# Patient Record
Sex: FEMALE | Race: WHITE | NOT HISPANIC OR LATINO | Employment: FULL TIME | URBAN - METROPOLITAN AREA
[De-identification: names, ages, dates, MRNs, and addresses within clinical notes are randomized per-mention and may not be internally consistent; named-entity substitution may affect disease eponyms.]

---

## 2018-03-09 ENCOUNTER — OFFICE VISIT (OUTPATIENT)
Dept: PULMONOLOGY | Facility: CLINIC | Age: 38
End: 2018-03-09
Payer: COMMERCIAL

## 2018-03-09 ENCOUNTER — HOSPITAL ENCOUNTER (OUTPATIENT)
Dept: RADIOLOGY | Facility: HOSPITAL | Age: 38
Discharge: HOME | End: 2018-03-09
Attending: INTERNAL MEDICINE
Payer: COMMERCIAL

## 2018-03-09 VITALS
DIASTOLIC BLOOD PRESSURE: 72 MMHG | OXYGEN SATURATION: 99 % | BODY MASS INDEX: 39.12 KG/M2 | RESPIRATION RATE: 18 BRPM | SYSTOLIC BLOOD PRESSURE: 130 MMHG | WEIGHT: 234.81 LBS | HEART RATE: 90 BPM | HEIGHT: 65 IN

## 2018-03-09 DIAGNOSIS — R06.02 SOB (SHORTNESS OF BREATH): Primary | ICD-10-CM

## 2018-03-09 DIAGNOSIS — Z87.09 HISTORY OF ASTHMA: ICD-10-CM

## 2018-03-09 DIAGNOSIS — R06.02 SOB (SHORTNESS OF BREATH): ICD-10-CM

## 2018-03-09 DIAGNOSIS — Z91.09 ENVIRONMENTAL ALLERGIES: ICD-10-CM

## 2018-03-09 DIAGNOSIS — J30.89 CHRONIC NON-SEASONAL ALLERGIC RHINITIS, UNSPECIFIED TRIGGER: ICD-10-CM

## 2018-03-09 PROCEDURE — 71046 X-RAY EXAM CHEST 2 VIEWS: CPT | Mod: 26,,, | Performed by: RADIOLOGY

## 2018-03-09 PROCEDURE — 99204 OFFICE O/P NEW MOD 45 MIN: CPT | Mod: S$GLB,,, | Performed by: INTERNAL MEDICINE

## 2018-03-09 PROCEDURE — 99999 PR PBB SHADOW E&M-NEW PATIENT-LVL IV: CPT | Mod: PBBFAC,,, | Performed by: INTERNAL MEDICINE

## 2018-03-09 PROCEDURE — 71046 X-RAY EXAM CHEST 2 VIEWS: CPT | Mod: TC

## 2018-03-09 RX ORDER — LISINOPRIL AND HYDROCHLOROTHIAZIDE 12.5; 2 MG/1; MG/1
1 TABLET ORAL
COMMUNITY
End: 2018-12-20

## 2018-03-09 RX ORDER — FLUOXETINE HYDROCHLORIDE 40 MG/1
40 CAPSULE ORAL
COMMUNITY
Start: 2016-03-28 | End: 2018-03-09 | Stop reason: ALTCHOICE

## 2018-03-09 RX ORDER — IRON,FM,PS/FOLIC/B,C18/L.CASEI 130-1.25MG
1 CAPSULE ORAL DAILY
Refills: 5 | COMMUNITY
Start: 2018-02-24 | End: 2018-12-20

## 2018-03-09 RX ORDER — FLUTICASONE FUROATE, UMECLIDINIUM BROMIDE AND VILANTEROL TRIFENATATE 100; 62.5; 25 UG/1; UG/1; UG/1
POWDER RESPIRATORY (INHALATION)
Refills: 0 | COMMUNITY
Start: 2018-01-11 | End: 2018-03-09 | Stop reason: ALTCHOICE

## 2018-03-09 RX ORDER — DOXYCYCLINE 100 MG/1
100 CAPSULE ORAL 2 TIMES DAILY
Refills: 0 | COMMUNITY
Start: 2018-01-11 | End: 2018-03-09 | Stop reason: ALTCHOICE

## 2018-03-09 RX ORDER — DICYCLOMINE HYDROCHLORIDE 20 MG/1
20 TABLET ORAL
COMMUNITY

## 2018-03-09 RX ORDER — MEDROXYPROGESTERONE ACETATE 150 MG/ML
150 INJECTION, SUSPENSION INTRAMUSCULAR
COMMUNITY
End: 2019-06-13 | Stop reason: ALTCHOICE

## 2018-03-09 RX ORDER — ALBUTEROL SULFATE 90 UG/1
AEROSOL, METERED RESPIRATORY (INHALATION)
Refills: 0 | COMMUNITY
Start: 2018-01-11 | End: 2022-11-01

## 2018-03-09 RX ORDER — ALPRAZOLAM 0.25 MG/1
TABLET ORAL
COMMUNITY
Start: 2016-02-08 | End: 2018-09-27

## 2018-03-09 RX ORDER — ESZOPICLONE 3 MG/1
TABLET, FILM COATED ORAL
COMMUNITY
Start: 2016-05-04 | End: 2018-09-27

## 2018-03-09 RX ORDER — OMEPRAZOLE 40 MG/1
40 CAPSULE, DELAYED RELEASE ORAL
COMMUNITY
End: 2018-09-27

## 2018-03-09 RX ORDER — ROSUVASTATIN CALCIUM 20 MG/1
20 TABLET, COATED ORAL
COMMUNITY
End: 2018-04-09 | Stop reason: ALTCHOICE

## 2018-03-09 RX ORDER — CYCLOBENZAPRINE HCL 10 MG
TABLET ORAL
COMMUNITY
Start: 2015-11-30 | End: 2018-09-27

## 2018-03-09 RX ORDER — CETIRIZINE HYDROCHLORIDE 10 MG/1
10 TABLET ORAL DAILY PRN
COMMUNITY

## 2018-03-09 RX ORDER — PROPRANOLOL HYDROCHLORIDE 60 MG/1
60 CAPSULE, EXTENDED RELEASE ORAL DAILY
COMMUNITY
Start: 2016-03-09 | End: 2018-12-20

## 2018-03-09 RX ORDER — FLUTICASONE FUROATE AND VILANTEROL 100; 25 UG/1; UG/1
1 POWDER RESPIRATORY (INHALATION) DAILY
Qty: 30 EACH | Refills: 4 | Status: SHIPPED | OUTPATIENT
Start: 2018-03-09 | End: 2018-12-20

## 2018-03-09 RX ORDER — FLUOXETINE HYDROCHLORIDE 20 MG/1
60 CAPSULE ORAL DAILY
Refills: 5 | COMMUNITY
Start: 2018-02-24 | End: 2018-09-27

## 2018-03-09 RX ORDER — MONTELUKAST SODIUM 10 MG/1
10 TABLET ORAL DAILY
COMMUNITY

## 2018-03-09 RX ORDER — METFORMIN HYDROCHLORIDE 1000 MG/1
1000 TABLET ORAL
COMMUNITY
End: 2018-09-27

## 2018-03-09 RX ORDER — ASPIRIN 81 MG/1
81 TABLET ORAL DAILY
COMMUNITY
End: 2022-03-03

## 2018-03-09 NOTE — PROGRESS NOTES
Initial Outpatient Pulmonary Evaluation       SUBJECTIVE:     History of Present Illness:  Patient is a 38 y.o. female presenting for evaluation of shortness of breath.  The patient states that she's been having shortness of breath at rest and on exertion for the last 2 years.  She has been recently at an urgent care clinic, she was prescribed albuterol and Trelegy inhalers.  She does state that her shortness of breath does improve significantly with albuterol.  However she she used albuterol and Trelegy Ellipta for a couple weeks only.  Interestingly she states that she has been asthmatic as a child and as a teenager and used to be on a rescue inhaler.  The patient has underwent weight-loss surgeries she did have a sleep test prior to her surgery and it was negative for sleep apnea.    Chief Complaint   Patient presents with    Shortness of Breath       Review of patient's allergies indicates:   Allergen Reactions    Codeine        Current Outpatient Prescriptions   Medication Sig Dispense Refill    ALPRAZolam (XANAX) 0.25 MG tablet TAKE ONE TABLET BY MOUTH EVERY NIGHT AT BEDTIME FOR SLEEP      aspirin (ECOTRIN) 81 MG EC tablet Take 81 mg by mouth.      cetirizine (ZYRTEC) 10 MG tablet Take 10 mg by mouth.      cyclobenzaprine (FLEXERIL) 10 MG tablet ONE TABLET BY MOUTH TWICE DAILY AS NEEDED FOR MUSCLE SPASMS      dicyclomine (BENTYL) 20 mg tablet Take 20 mg by mouth.      eszopiclone 3 mg Tab       FLUoxetine (PROZAC) 20 MG capsule Take 60 mg by mouth once daily.  5    FUSION PLUS 130 mg iron -1,250 mcg Cap Take 1 capsule by mouth once daily.  5    linaclotide (LINZESS) 145 mcg Cap capsule Take 1 tablet by mouth.      lisinopril-hydrochlorothiazide (PRINZIDE,ZESTORETIC) 20-12.5 mg per tablet Take 1 tablet by mouth.      medroxyPROGESTERone (DEPO-PROVERA) 150 mg/mL injection Inject 150 mg into the muscle.      metFORMIN (GLUCOPHAGE) 1000 MG tablet Take 1,000  mg by mouth.      montelukast (SINGULAIR) 10 mg tablet Take 10 mg by mouth.      omeprazole (PRILOSEC) 40 MG capsule Take 40 mg by mouth.      propranolol (INDERAL LA) 60 MG 24 hr capsule Take by mouth.      rosuvastatin (CRESTOR) 20 MG tablet Take 20 mg by mouth.      SITagliptin (JANUVIA) 50 MG Tab       fluticasone-vilanterol (BREO ELLIPTA) 100-25 mcg/dose diskus inhaler Inhale 1 puff into the lungs once daily. Controller 30 each 4    PROAIR HFA 90 mcg/actuation inhaler INHALE TWO PUFFS UP TO FOUR TIMES DAILY IF NEEDED FOR QUICK RELIEF ONLY  0     No current facility-administered medications for this visit.        Past Medical History:   Diagnosis Date    Anemia     Asthma     Childhood    Diabetes mellitus 2003    Fibromyalgia 2013    Heart disease     Hypertension 1996     Past Surgical History:   Procedure Laterality Date    SINUS SURGERY  1996    SINUS SURGERY  2007     Family History   Problem Relation Age of Onset    Cancer Mother     Hypertension Mother     Cancer Father     Asthma Paternal Uncle     Cancer Maternal Grandfather      Social History   Substance Use Topics    Smoking status: Former Smoker     Packs/day: 0.50     Years: 10.00     Types: Cigarettes     Quit date: 2009    Smokeless tobacco: Never Used    Alcohol use No        Review of Systems:  Review of Systems   Constitutional: Negative for fatigue.   HENT: Positive for congestion.         Sinus problems status post surgery   Eyes: Negative for photophobia and redness.   Respiratory: Positive for chest tightness and shortness of breath.    Cardiovascular: Negative for palpitations and leg swelling.   Gastrointestinal: Negative for abdominal pain.   Endocrine:        Diabetes mellitus   Genitourinary: Negative for hematuria.   Musculoskeletal: Negative for arthralgias and back pain.   Allergic/Immunologic: Positive for environmental allergies. Negative for immunocompromised state.   Neurological: Negative for seizures  "and syncope.   Hematological: Negative for adenopathy.   Psychiatric/Behavioral: Negative for behavioral problems.       OBJECTIVE:     Vital Signs (Most Recent)  Pulse: 90 (03/09/18 1539)  Resp: 18 (03/09/18 1539)  BP: 130/72 (03/09/18 1539)  SpO2: 99 % (03/09/18 1539)  5' 5" (1.651 m)  106.5 kg (234 lb 12.6 oz)     Physical Exam:  Physical Exam   Constitutional: She is oriented to person, place, and time. She appears well-developed and well-nourished.   HENT:   Head: Normocephalic and atraumatic.   Eyes: EOM are normal.   Neck: Neck supple.   Neck scar anterior, remote spine surgery   Cardiovascular: Normal rate and regular rhythm.    Pulmonary/Chest: Effort normal. No respiratory distress. She has no wheezes. She has no rales. She exhibits no tenderness.   Breath sounds decreased minimally at bases   Abdominal: Soft. There is no tenderness.   Musculoskeletal: She exhibits no edema or deformity.   Neurological: She is alert and oriented to person, place, and time.   Skin: Skin is warm.   Psychiatric: She has a normal mood and affect.       Laboratory    No results found for: WBC, HGB, HCT, MCV, PLT  BMP  No results found for: NA, K, CL, CO2, BUN, CREATININE, CALCIUM, ANIONGAP, ESTGFRAFRICA, EGFRNONAA  BNP  @LABRCNTIP(BNP,BNPTRIAGEBLO)@  No results found for: TSH  ABG  @LABRCNTIP(PH,PO2,PCO2,HCO3,BE)@    Diagnostic Results:  X-Ray: Reviewed  No acute abnormalities    X-ray Chest Pa And Lateral    Result Date: 3/9/2018  No active disease. Electronically signed by: João Israel MD Date:    03/09/2018 Time:    15:36        ASSESSMENT/PLAN:     SOB (shortness of breath)  -     fluticasone-vilanterol (BREO ELLIPTA) 100-25 mcg/dose diskus inhaler; Inhale 1 puff into the lungs once daily. Controller  Dispense: 30 each; Refill: 4  -     Complete PFT with bronchodilator; Future    History of asthma  -     CBC auto differential; Future; Expected date: 03/09/2018  -     IGE; Future; Expected date: 03/09/2018    Environmental " allergies  -     CBC auto differential; Future; Expected date: 03/09/2018  -     IGE; Future; Expected date: 03/09/2018    Chronic non-seasonal allergic rhinitis, unspecified trigger  -     CBC auto differential; Future; Expected date: 03/09/2018  -     IGE; Future; Expected date: 03/09/2018      This patient most likely has a mild asthma, I will treat her empirically with Breo 100 µg 1 puff daily, I have explained to her the need to be compliant with daily usage of this controller inhaler, continue albuterol as needed.    Continue steroid nasal spray plus Singulair at bedtime.    Check IgE level CBC with differential to evaluate for atopy and eosinophilia      Follow-up in about 2 weeks (around 3/23/2018).    This note was prepared using voice recognition system and is likely to have sound alike errors that may have been overlooked even after proof reading.  Please call me with any questions    Discussed diagnosis, its evaluation, treatment and usual course. All questions answered.    Thank you for the courtesy of participating in the care of this patient    Tanvir Mosher MD

## 2018-03-15 ENCOUNTER — LAB VISIT (OUTPATIENT)
Dept: LAB | Facility: HOSPITAL | Age: 38
End: 2018-03-15
Attending: INTERNAL MEDICINE
Payer: COMMERCIAL

## 2018-03-15 ENCOUNTER — INITIAL CONSULT (OUTPATIENT)
Dept: HEMATOLOGY/ONCOLOGY | Facility: CLINIC | Age: 38
End: 2018-03-15
Payer: COMMERCIAL

## 2018-03-15 VITALS
DIASTOLIC BLOOD PRESSURE: 70 MMHG | HEIGHT: 65 IN | OXYGEN SATURATION: 100 % | WEIGHT: 236.31 LBS | SYSTOLIC BLOOD PRESSURE: 100 MMHG | HEART RATE: 73 BPM | RESPIRATION RATE: 18 BRPM | BODY MASS INDEX: 39.37 KG/M2 | TEMPERATURE: 98 F

## 2018-03-15 DIAGNOSIS — D50.8 OTHER IRON DEFICIENCY ANEMIAS: ICD-10-CM

## 2018-03-15 DIAGNOSIS — E61.1 IRON DEFICIENCY: Primary | ICD-10-CM

## 2018-03-15 DIAGNOSIS — Z98.84 S/P BARIATRIC SURGERY: ICD-10-CM

## 2018-03-15 DIAGNOSIS — E61.1 IRON DEFICIENCY: ICD-10-CM

## 2018-03-15 PROCEDURE — 99999 PR PBB SHADOW E&M-EST. PATIENT-LVL IV: CPT | Mod: PBBFAC,,, | Performed by: INTERNAL MEDICINE

## 2018-03-15 PROCEDURE — 82607 VITAMIN B-12: CPT

## 2018-03-15 PROCEDURE — 83540 ASSAY OF IRON: CPT

## 2018-03-15 PROCEDURE — 82728 ASSAY OF FERRITIN: CPT

## 2018-03-15 PROCEDURE — 99245 OFF/OP CONSLTJ NEW/EST HI 55: CPT | Mod: S$GLB,,, | Performed by: INTERNAL MEDICINE

## 2018-03-15 PROCEDURE — 36415 COLL VENOUS BLD VENIPUNCTURE: CPT | Mod: PO

## 2018-03-15 RX ORDER — HEPARIN 100 UNIT/ML
500 SYRINGE INTRAVENOUS
Status: CANCELLED | OUTPATIENT
Start: 2018-03-30

## 2018-03-15 RX ORDER — SODIUM CHLORIDE 0.9 % (FLUSH) 0.9 %
10 SYRINGE (ML) INJECTION
Status: CANCELLED | OUTPATIENT
Start: 2018-03-30

## 2018-03-15 RX ORDER — SODIUM CHLORIDE 0.9 % (FLUSH) 0.9 %
10 SYRINGE (ML) INJECTION
Status: CANCELLED | OUTPATIENT
Start: 2018-03-23

## 2018-03-15 RX ORDER — HEPARIN 100 UNIT/ML
500 SYRINGE INTRAVENOUS
Status: CANCELLED | OUTPATIENT
Start: 2018-03-23

## 2018-03-15 NOTE — LETTER
March 15, 2018      MOJGAN Lyons  [address]  [address]  [facility] [address]           [facility] - Hematology Oncology  [address]  [place] [address]  Phone: [phone]  Fax: [phone]          Patient: Shelby Meeks   MR Number: [id number]   YOB: 1980   Date of Visit: 3/15/2018       Dear Tamiko Sanchez:    Thank you for referring Shelby Meeks to me for evaluation. Attached you will find relevant portions of my assessment and plan of care.    If you have questions, please do not hesitate to call me. I look forward to following Shelby Meeks along with you.    Sincerely,    Evan Carroll MD    Enclosure  CC:  No Recipients    If you would like to receive this communication electronically, please contact [email or web address] or [phone] to request more information on [facility] access.    For providers and/or their staff who would like to refer a patient to [facility], please contact us through our one-stop-shop provider referral line, [facility], at [phone].    If you feel you have received this communication in error or would no longer like to receive these types of communications, please e-mail [email or web address]

## 2018-03-15 NOTE — PROGRESS NOTES
MAIN COMPLAINT:  We are asked by Dr. Lori Sanchez to evaluate this 38-year-old    lady in regard to her history of iron deficiency.    HISTORY OF PRESENT ILLNESS:  This 38-year-old  lady had a gastric band   in the year 2001 for weight loss and it was reversed in 2006.  She ended up   having a gastric bypass in 2015.    She has not menstruated in many years because of being on Depo-Provera, which   she takes for treatment for treatment/ prevention of endometriosis.     She has been taking oral iron for several months without any significant   improvement.  Her CBC shows a hemoglobin of 11.2 with an MCV of 85, white cell   count of 9620 (normal differential), a platelet count 459,000.    The patient had a serum iron of 40 with a total iron binding capacity of 546 and   a saturated iron of 7 done at [facility] recently.  Ferritin is   pending.    She feels fatigued.    ALLERGIES:  Codeine.    MEDICATIONS:  See MedCard.    PREVIOUS SURGERIES:  Tonsillectomy, adenoidectomy, sinus surgery for removal of   polyps, 2 spinal surgeries, 2 laparoscopies due to endometriosis, gastric   banding in 2001 and revision in 2006 and gastric bypass in 2015.  She has had a   neck surgery.    SOCIAL HISTORY:  She is single with no children.  She lives in [place].    She used to smoke half a pack a day for 10 years and stopped 2009.  Denies   significant drinking.  She works as a .    FAMILY HISTORY:  Maternal grandfather had lung cancer.  Maternal great aunt had   ovarian cancer.  Father and mother had diabetes.  Father had a heart attack.    PAST MEDICAL HISTORY:  1.  Status post bariatric surgery for morbid obesity.  2.  History of tobacco use.  3.  History of diabetes mellitus.  4.  Iron deficiency anemia.  5.  Endometriosis.    REVIEW OF SYSTEMS:  GENERAL:  No weight loss or fatigue.  EYES:  No vision problems or excessive lacrimation.  OROPHARYNX:  No difficulty or pain on  swallowing.  ENDOCRINE:  No heat or cold intolerance.  LUNGS:  No shortness of breath or coughing.  CARDIOVASCULAR:  No chest pains or palpitation.  GASTROINTESTINAL:  No nausea, vomiting or diarrhea.  No hepatitis or jaundice.  GENITOURINARY:   0, para 0.  On Provera for many years.  She has had a   laparoscopies for endometriosis.  BREASTS:  No tenderness or lumps.  SKIN:  No blisters or ecchymosis.  NEUROLOGIC:  No headaches or seizures.  PSYCHIATRIC:  No mood swings or depression.    PHYSICAL EXAMINATION:  GENERAL:  She was well groomed.  She interacted normally during the interview.  VITAL SIGNS:  Weight 236 pounds, height 65 inches, blood pressure 100/70, pulse   72, respirations 18.  EYES:  No jaundice or paleness.  OROPHARYNX:  No ulcers.  No exudates.  ENDOCRINE:  No palpable thyroid.  LYMPHATICS:  No cervical or supraclavicular adenopathy.  NECK:  No jugular venous distension or masses.  LUNGS:  Clear and well ventilated without rales, wheezes or rhonchi.  CARDIOVASCULAR:  The heart was rhythmic without murmurs or gallops.  ABDOMEN:  Obese, no obvious hepatosplenomegaly, guarding or rebound.  EXTREMITIES:  No edema.  Good dorsalis pedis and posterior tibialis pulses.  SKIN:  No rashes or ecchymosis.  NEUROLOGIC:  Coordination, strength, and gait were normal.  PSYCHIATRIC:  Mood was appropriate.  Lucid and oriented x3.    DISCUSSION:  This patient has tests recently that showed iron deficiency anemia.    We will repeat today a ferritin, TIBC, and serum B12.  She has been told that   in the patient who has had bariatric surgery, it is not uncommon to develop iron   deficiency anemia due to lack of absorption.  She has not had a menstrual   period in quite some time after being on Provera, so menstrual iron losses are   not an issue.    The patient will see me in seven days and we will plan on starting her on   Injectafer.      RDF/HN  dd: 03/15/2018 16:27:31 (CDT)  td: 03/15/2018 20:31:28 (CDT)  Doc  ID   #[id number]  Job ID #[id number]    CC: Tamiko ULRICH

## 2018-03-16 LAB
FERRITIN SERPL-MCNC: 9 NG/ML
IRON SERPL-MCNC: 17 UG/DL
SATURATED IRON: 3 %
TOTAL IRON BINDING CAPACITY: 583 UG/DL
TRANSFERRIN SERPL-MCNC: 394 MG/DL
VIT B12 SERPL-MCNC: 231 PG/ML

## 2018-03-20 ENCOUNTER — TELEPHONE (OUTPATIENT)
Dept: PULMONOLOGY | Facility: CLINIC | Age: 38
End: 2018-03-20

## 2018-03-20 NOTE — TELEPHONE ENCOUNTER
Contacted pt to advise that blood work was okay. Not showing significant allergies.  No answer. Left message for return call.

## 2018-03-20 NOTE — TELEPHONE ENCOUNTER
----- Message from Tanvir Mosher MD sent at 3/10/2018 11:42 AM CST -----  Please inform patient blood work was okay. Not showing significant allergies.   Thank you

## 2018-03-21 NOTE — TELEPHONE ENCOUNTER
Returned call to pt to advise that blood work was okay. Not showing significant allergies. She verbalized understanding.

## 2018-03-21 NOTE — TELEPHONE ENCOUNTER
----- Message from Franklin Agarwal sent at 3/21/2018  9:40 AM CDT -----  Pt is returning a call to nurse to discuss lab results.        Please call pt back at [phone]

## 2018-03-23 ENCOUNTER — INFUSION (OUTPATIENT)
Dept: INFUSION THERAPY | Facility: HOSPITAL | Age: 38
End: 2018-03-23
Attending: INTERNAL MEDICINE
Payer: COMMERCIAL

## 2018-03-23 ENCOUNTER — LAB VISIT (OUTPATIENT)
Dept: LAB | Facility: HOSPITAL | Age: 38
End: 2018-03-23
Attending: INTERNAL MEDICINE
Payer: COMMERCIAL

## 2018-03-23 ENCOUNTER — OFFICE VISIT (OUTPATIENT)
Dept: HEMATOLOGY/ONCOLOGY | Facility: CLINIC | Age: 38
End: 2018-03-23
Payer: COMMERCIAL

## 2018-03-23 VITALS
SYSTOLIC BLOOD PRESSURE: 110 MMHG | HEART RATE: 90 BPM | OXYGEN SATURATION: 100 % | RESPIRATION RATE: 18 BRPM | TEMPERATURE: 99 F | WEIGHT: 233.69 LBS | BODY MASS INDEX: 38.93 KG/M2 | HEIGHT: 65 IN | DIASTOLIC BLOOD PRESSURE: 70 MMHG

## 2018-03-23 VITALS — DIASTOLIC BLOOD PRESSURE: 63 MMHG | SYSTOLIC BLOOD PRESSURE: 96 MMHG | HEART RATE: 77 BPM

## 2018-03-23 DIAGNOSIS — Z98.84 S/P BARIATRIC SURGERY: ICD-10-CM

## 2018-03-23 DIAGNOSIS — D51.8 OTHER VITAMIN B12 DEFICIENCY ANEMIAS: ICD-10-CM

## 2018-03-23 DIAGNOSIS — D50.8 OTHER IRON DEFICIENCY ANEMIAS: Primary | ICD-10-CM

## 2018-03-23 LAB — B-HCG UR QL: NEGATIVE

## 2018-03-23 PROCEDURE — 81025 URINE PREGNANCY TEST: CPT | Mod: PO

## 2018-03-23 PROCEDURE — 96374 THER/PROPH/DIAG INJ IV PUSH: CPT | Mod: PO

## 2018-03-23 PROCEDURE — 25000003 PHARM REV CODE 250: Mod: PO | Performed by: INTERNAL MEDICINE

## 2018-03-23 PROCEDURE — 99999 PR PBB SHADOW E&M-EST. PATIENT-LVL IV: CPT | Mod: PBBFAC,,, | Performed by: INTERNAL MEDICINE

## 2018-03-23 PROCEDURE — 63600175 PHARM REV CODE 636 W HCPCS: Mod: JG,PO | Performed by: INTERNAL MEDICINE

## 2018-03-23 PROCEDURE — 99214 OFFICE O/P EST MOD 30 MIN: CPT | Mod: 25,S$GLB,, | Performed by: INTERNAL MEDICINE

## 2018-03-23 RX ORDER — CYANOCOBALAMIN 1000 UG/ML
1000 INJECTION, SOLUTION INTRAMUSCULAR; SUBCUTANEOUS
Status: CANCELLED
Start: 2018-04-01

## 2018-03-23 RX ADMIN — FERRIC CARBOXYMALTOSE INJECTION 750 MG: 50 INJECTION, SOLUTION INTRAVENOUS at 01:03

## 2018-03-23 RX ADMIN — SODIUM CHLORIDE: 9 INJECTION, SOLUTION INTRAVENOUS at 01:03

## 2018-03-23 NOTE — PROGRESS NOTES
Subjective:       Patient ID: Shelby Meeks is a 38 y.o. female.    Chief Complaint: Anemia    HPI  This 38-year-old  lady had a gastric band   in the year 2001 for weight loss and it was reversed in 2006.  She ended up   having a gastric bypass in 2015.     She has not menstruated in many years because of being on Depo-Provera, which   she takes for treatment for treatment/ prevention of endometriosis.      She has been taking oral iron for several months without any significant   improvement.  Her CBC shows a hemoglobin of 11.2 with an MCV of 85, white cell   count of 9620 (normal differential), a platelet count 459,000.     The patient had a serum iron of 40 with a total iron binding capacity of 546 and   a saturated iron of 7 done at [facility] recently.      She feels fatigued.  Lab tests show a low ferritin of 9 and a B12 level kn the lower limits of normal at 231.     ALLERGIES:  Codeine.     MEDICATIONS:  See MedCard.     PREVIOUS SURGERIES:  Tonsillectomy, adenoidectomy, sinus surgery for removal of   polyps, 2 spinal surgeries, 2 laparoscopies due to endometriosis, gastric   banding in 2001 and revision in 2006 and gastric bypass in 2015.  She has had a   neck surgery.     SOCIAL HISTORY:  She is single with no children.  She lives in [place].    She used to smoke half a pack a day for 10 years and stopped 2009.  Denies   significant drinking.  She works as a .     FAMILY HISTORY:  Maternal grandfather had lung cancer.  Maternal great aunt had   ovarian cancer.  Father and mother had diabetes.  Father had a heart attack.     PAST MEDICAL HISTORY:  1.  Status post bariatric surgery for morbid obesity.  2.  History of tobacco use.  3.  History of diabetes mellitus.  4.  Iron deficiency anemia.  5.  Endometriosis.     Review of Systems   Constitutional: Negative.    HENT: Negative.    Eyes: Negative.    Respiratory: Negative.  Negative for cough and  wheezing.    Cardiovascular: Negative.  Negative for chest pain.   Gastrointestinal: Negative.    Genitourinary: Negative.    Neurological: Negative.    Psychiatric/Behavioral: Negative.        Objective:      Physical Exam   Constitutional: She is oriented to person, place, and time. She appears well-developed. No distress.   HENT:   Head: Normocephalic.   Right Ear: Tympanic membrane, external ear and ear canal normal.   Left Ear: Tympanic membrane, external ear and ear canal normal.   Nose: Nose normal. Right sinus exhibits no maxillary sinus tenderness and no frontal sinus tenderness. Left sinus exhibits no maxillary sinus tenderness and no frontal sinus tenderness.   Mouth/Throat: Oropharynx is clear and moist and mucous membranes are normal.   Teeth normal.  Gums normal.   Eyes: Conjunctivae and lids are normal. Pupils are equal, round, and reactive to light.   Neck: Normal carotid pulses, no hepatojugular reflux and no JVD present. Carotid bruit is not present. No tracheal deviation present. No thyroid mass and no thyromegaly present.   Cardiovascular: Normal rate, regular rhythm, S1 normal, S2 normal, normal heart sounds and intact distal pulses.  Exam reveals no gallop and no friction rub.    No murmur heard.  Carotid exam normal   Pulmonary/Chest: Effort normal and breath sounds normal. No accessory muscle usage. No respiratory distress. She has no wheezes. She has no rales. She exhibits no tenderness.   Abdominal: Soft. Normal appearance. She exhibits no distension and no mass. There is no splenomegaly or hepatomegaly. There is no tenderness. There is no rebound and no guarding.   Musculoskeletal: Normal range of motion. She exhibits no edema or tenderness.        Right hand: Normal.        Left hand: Normal.       Lymphadenopathy:     She has no cervical adenopathy.     She has no axillary adenopathy.        Right: No inguinal and no supraclavicular adenopathy present.        Left: No inguinal and no  supraclavicular adenopathy present.   Neurological: She is alert and oriented to person, place, and time. She has normal strength. No cranial nerve deficit. Coordination normal.   Skin: Skin is warm and dry. No rash noted. She is not diaphoretic. No cyanosis or erythema. No pallor. Nails show no clubbing.   Psychiatric: She has a normal mood and affect. Her behavior is normal. Judgment and thought content normal.       Wt Readings from Last 3 Encounters:   03/23/18 106 kg (233 lb 11 oz)   03/15/18 107.2 kg (236 lb 5.3 oz)   03/09/18 106.5 kg (234 lb 12.6 oz)     Temp Readings from Last 3 Encounters:   03/23/18 98.7 °F (37.1 °C)   03/15/18 98 °F (36.7 °C) (Oral)     BP Readings from Last 3 Encounters:   03/23/18 110/70   03/15/18 100/70   03/09/18 130/72     Pulse Readings from Last 3 Encounters:   03/23/18 90   03/15/18 73   03/09/18 90       Assessment:       1. Other iron deficiency anemias    2. Other vitamin B12 deficiency anemias    3. S/P bariatric surgery        Plan:       .  Lab Results   Component Value Date    WBC 9.62 03/09/2018    HGB 11.2 (L) 03/09/2018    HCT 35.2 (L) 03/09/2018    MCV 83 03/09/2018     (H) 03/09/2018     Lab Results   Component Value Date    IRON 17 (L) 03/15/2018    TIBC 583 (H) 03/15/2018    FERRITIN 9 (L) 03/15/2018     Lab Results   Component Value Date    UNUQYVNO93 231 03/15/2018     Will gie the first dose of Injectafer today. Will start B12 supplementation at the office once every month   We feel her post-bariatric surgery status is the reason for her iron and b12 deficiency.In any case,   Hemoccult slides given to patient to bring to next appointment

## 2018-03-23 NOTE — PATIENT INSTRUCTIONS
Iron-Deficiency Anemia (Adult)  Red blood cells carry oxygen to the tissues of your body. Anemia is a condition in which you have too few red blood cells. You need iron to make red cells. Anemia makes you feel tired and run down. When anemia becomes severe, your skin becomes pale. You may feel short of breath after physical activity. Other symptoms include:  · Headaches  · Dizziness  · Leg cramps with physical activity  · Drowsiness  · Restless legs  Your anemia is caused by not having enough iron in your body. This may be because of:  · Loss of blood. This can be caused by heavy menstrual periods. It can also be caused by bleeding from the stomach or intestines.  · Poor diet. You may not be eating enough foods that contain iron.  · Inability to absorb iron from the foods you eat  · Pregnancy  If your blood count is low enough, your healthcare provider may prescribe an iron supplement. It usually takes about 2 to 3 months of treatment with iron supplements to correct anemia. Severe cases of anemia need a blood transfusion to quickly ease symptoms and deliver more oxygen to the cells.  Home care  Follow these guidelines when caring for yourself at home:  · Eat foods high in iron. This will boost the amount of iron stored in your body. It is a natural way to build up the number of blood cells. Good sources of iron include beef, liver, spinach and other dark green leafy vegetables, whole grains, beans, and nuts.  · Do not overexert yourself.  · Talk with your healthcare provider before traveling by air or traveling to high altitudes.  Follow-up care  Follow up with your healthcare provider in 2 months, or as advised. This is to have another red blood cell count to be sure your anemia has been fixed.  When to seek medical advice  Call your healthcare provider right away if any of these occur:  · Shortness of breath or chest pain  · Dizziness or fainting  · Vomiting blood or passing red or black-colored stool   Date  Last Reviewed: 2/25/2016 © 2000-2017 [employer]. [address] [place] [address], [place] [address]. All rights reserved. This information is not intended as a substitute for professional medical care. Always follow your healthcare professional's instructions.        Ferric carboxymaltose injection  What is this medicine?  FERRIC CARBOXYMALTOSE (ferr-ik car-box-ee-mol-toes) is an iron complex. Iron is used to make healthy red blood cells, which carry oxygen and nutrients throughout the body. This medicine is used to treat anemia in people with chronic kidney disease or people who cannot take iron by mouth.  How should I use this medicine?  This medicine is for infusion into a vein. It is given by a health care professional in a hospital or clinic setting.  Talk to your pediatrician regarding the use of this medicine in children. Special care may be needed.  What side effects may I notice from receiving this medicine?  Side effects that you should report to your doctor or health care professional as soon as possible:  · allergic reactions like skin rash, itching or hives, swelling of the face, lips, or tongue -breathing problems  · changes in blood pressure  · feeling faint or lightheaded, falls  · flushing, sweating, or hot feelings  Side effects that usually do not require medical attention (Report these to your doctor or health care professional if they continue or are bothersome.):  · changes in taste  · constipation  · dizziness  · headache  · nausea  · pain, redness, or irritation at site where injected  · vomiting  What may interact with this medicine?  Do not take this medicine with any of the following medications:  · deferoxamine  · dimercaprol  · other iron products  This medicine may also interact with the following medications:  · chloramphenicol  · deferasirox  What if I miss a dose?  It is important not to miss your dose. Call your doctor or health care professional if you are unable to keep an  appointment.  Where should I keep my medicine?  This drug is given in a hospital or clinic and will not be stored at home.  What should I tell my health care provider before I take this medicine?  They need to know if you have any of these conditions:  · anemia not caused by low iron levels  · high levels of iron in the blood  · liver disease  · an unusual or allergic reaction to iron, other medicines, foods, dyes, or preservatives  · pregnant or trying to get pregnant  · breast-feeding  What should I watch for while using this medicine?  Visit your doctor or health care professional regularly. Tell your doctor if your symptoms do not start to get better or if they get worse. You may need blood work done while you are taking this medicine.  You may need to follow a special diet. Talk to your doctor. Foods that contain iron include: whole grains/cereals, dried fruits, beans, or peas, leafy green vegetables, and organ meats (liver, kidney).  NOTE:This sheet is a summary. It may not cover all possible information. If you have questions about this medicine, talk to your doctor, pharmacist, or health care provider. Copyright© 2017 [employer]        Cyanocobalamin, Vitamin B12 injection  What is this medicine?  CYANOCOBALAMIN (sye an oh koe BAL a min) is a man made form of vitamin B12. Vitamin B12 is used in the growth of healthy blood cells, nerve cells, and proteins in the body. It also helps with the metabolism of fats and carbohydrates. This medicine is used to treat people who can not absorb vitamin B12.  How should I use this medicine?  This medicine is injected into a muscle or deeply under the skin. It is usually given by a health care professional in a clinic or doctor's office. However, your doctor may teach you how to inject yourself. Follow all instructions.  Talk to your pediatrician regarding the use of this medicine in children. Special care may be needed.  What side effects may I notice from receiving  this medicine?  Side effects that you should report to your doctor or health care professional as soon as possible:  · allergic reactions like skin rash, itching or hives, swelling of the face, lips, or tongue  · blue tint to skin  · chest tightness, pain  · difficulty breathing, wheezing  · dizziness  · red, swollen painful area on the leg  Side effects that usually do not require medical attention (report to your doctor or health care professional if they continue or are bothersome):  · diarrhea  · headache  What may interact with this medicine?  · colchicine  · heavy alcohol intake  What if I miss a dose?  If you are given your dose at a clinic or doctor's office, call to reschedule your appointment. If you give your own injections and you miss a dose, take it as soon as you can. If it is almost time for your next dose, take only that dose. Do not take double or extra doses.  Where should I keep my medicine?  Keep out of the reach of children.  Store at room temperature between 15 and 30 degrees C (59 and 85 degrees F). Protect from light. Throw away any unused medicine after the expiration date.  What should I tell my health care provider before I take this medicine?  They need to know if you have any of these conditions:  · kidney disease  · Landon's disease  · megaloblastic anemia  · an unusual or allergic reaction to cyanocobalamin, cobalt, other medicines, foods, dyes, or preservatives  · pregnant or trying to get pregnant  · breast-feeding  What should I watch for while using this medicine?  Visit your doctor or health care professional regularly. You may need blood work done while you are taking this medicine.  You may need to follow a special diet. Talk to your doctor. Limit your alcohol intake and avoid smoking to get the best benefit.  NOTE:This sheet is a summary. It may not cover all possible information. If you have questions about this medicine, talk to your doctor, pharmacist, or health care  provider. Copyright© 2017 [employer]        Vitamin B-12  Does this test have other names?  VB12, serum cobalamin  What is this test?  This test measures the level of vitamin B-12 in your blood. You need this vitamin to make red blood cells and for your nervous system to function as it should.  You get vitamin B-12 from eating foods that come from animals, such as meat, eggs, and dairy products. Vitamin B-12 is also added to some cereals. You can also take this vitamin as a supplement in pill form.  Why do I need this test?  You may need this test if your healthcare provider suspects that your vitamin B-12 level is low. A low level of vitamin B-12 is called vitamin B-12 deficiency. You are more likely to have vitamin deficiency if you are an older adult, have a digestive disorder called malabsorption, have had gastrointestinal surgery, or eat a vegan diet. Women who are pregnant or breastfeeding and eat a vegetarian-type diet are at high risk for this deficiency.  You may also need this test if you've been diagnosed with or your healthcare provider suspects a disease called pernicious anemia. Pernicious anemia affects the lining of your stomach and makes it hard to absorb vitamin B-12.  These are common symptoms of vitamin B-12 deficiency:  · Fatigue  · Weakness  · Weight loss  · Tingling or numbness of the hands and feet  · Constipation  · Poor balance  · Confusion  · Depression  · Memory loss  · Soreness of the mouth or tongue  What other tests might I have along with this test?  Your healthcare provider may order other tests to help find out the cause of your vitamin B-12 deficiency. These tests may include:  · Complete blood count  · Peripheral blood smear, which involves looking at your blood cells under a microscope  · Folic acid level. This vitamin is also important for red blood cell production.  What do my test results mean?  Many things may affect your lab test results. These include the method each lab  uses to do the test. Even if your test results are different from the normal value, you may not have a problem. To learn what the results mean for you, talk with your healthcare provider.  Vitamin B-12 is measured in picograms per milliliter (pg/mL). Normal results are:  · 200 to 835 pg/mL for adults  · 160 to 1,300 pg/mL for newborns  If your results are low, you may have:  · Pernicious anemia  · Malabsorption from inflammatory bowel disease or other causes  · Poor absorption because of surgery  · Tapeworm infection  · Too little intake of animal protein  · Folic acid deficiency  · Iron deficiency  If your levels are high, you may have:  · Liver or kidney disease  · Diabetes  · Obesity  · White blood cell cancer  High levels may also mean that you have chronic obstructive pulmonary disease , congestive heart failure, or a thickening of the blood called polycythemia vera.  How is this test done?  The test requires a blood sample, which is drawn through a needle from a vein in your arm.  Does this test pose any risks?  Taking a blood sample with a needle carries risks that include bleeding, infection, bruising, or feeling dizzy. When the needle pricks your arm, you may feel a slight stinging sensation or pain. Afterward, the site may be slightly sore.  What might affect my test results?  Certain conditions may affect your test results. These include:  · Pregnancy  · Recent blood transfusions  · Smoking  Medicines in general may also affect your results. Specific medicines include supplements of vitamin A or C and birth control pills.  How do I get ready for this test?  You must fast before this test. You may be able to drink water, but you should not eat or drink anything else after midnight on the night before the test. If you are not having the test in the morning, ask your healthcare provider how long you need to fast before the test. You should not have a vitamin B-12 injection before the test. Also be sure your  provider knows about all medicines, herbs, vitamins, and supplements you are taking. This includes medicines that don't need a prescription and any illicit drugs you may use.  © 4983-5148 [employer]. [address] [place] [address], [place] [address]. All rights reserved. This information is not intended as a substitute for professional medical care. Always follow your healthcare professional's instructions.

## 2018-03-23 NOTE — PLAN OF CARE
"Problem: Patient Care Overview  Goal: Plan of Care Review  Outcome: Ongoing (interventions implemented as appropriate)  "I feel fine, just have low energy."      "

## 2018-04-02 ENCOUNTER — INFUSION (OUTPATIENT)
Dept: INFUSION THERAPY | Facility: HOSPITAL | Age: 38
End: 2018-04-02
Attending: INTERNAL MEDICINE
Payer: COMMERCIAL

## 2018-04-02 VITALS
TEMPERATURE: 99 F | SYSTOLIC BLOOD PRESSURE: 99 MMHG | OXYGEN SATURATION: 98 % | DIASTOLIC BLOOD PRESSURE: 70 MMHG | HEART RATE: 90 BPM | RESPIRATION RATE: 16 BRPM

## 2018-04-02 DIAGNOSIS — D51.8 OTHER VITAMIN B12 DEFICIENCY ANEMIAS: ICD-10-CM

## 2018-04-02 DIAGNOSIS — D50.8 OTHER IRON DEFICIENCY ANEMIAS: Primary | ICD-10-CM

## 2018-04-02 PROCEDURE — 96365 THER/PROPH/DIAG IV INF INIT: CPT | Mod: PO

## 2018-04-02 PROCEDURE — 96372 THER/PROPH/DIAG INJ SC/IM: CPT | Mod: PO

## 2018-04-02 PROCEDURE — 25000003 PHARM REV CODE 250: Mod: PO | Performed by: INTERNAL MEDICINE

## 2018-04-02 PROCEDURE — 63600175 PHARM REV CODE 636 W HCPCS: Mod: PO | Performed by: INTERNAL MEDICINE

## 2018-04-02 RX ORDER — CYANOCOBALAMIN 1000 UG/ML
1000 INJECTION, SOLUTION INTRAMUSCULAR; SUBCUTANEOUS
Status: CANCELLED
Start: 2018-04-02

## 2018-04-02 RX ORDER — CYANOCOBALAMIN 1000 UG/ML
1000 INJECTION, SOLUTION INTRAMUSCULAR; SUBCUTANEOUS
Status: DISCONTINUED | OUTPATIENT
Start: 2018-04-02 | End: 2018-04-02 | Stop reason: HOSPADM

## 2018-04-02 RX ADMIN — FERRIC CARBOXYMALTOSE INJECTION 750 MG: 50 INJECTION, SOLUTION INTRAVENOUS at 01:04

## 2018-04-02 RX ADMIN — CYANOCOBALAMIN 1000 MCG: 1000 INJECTION, SOLUTION INTRAMUSCULAR at 01:04

## 2018-04-02 NOTE — PATIENT INSTRUCTIONS
[facility]  [address] [place]  [address] [address]  [phone] phone     [phone] fax  Hours of Operation: Monday- Friday 8:00am- 5:00pm  After hours phone  [phone]  Hematology / Oncology Physicians on call      Dr. Manny Fowler                        Please call with any concerns regarding your appointment today.    Ferric carboxymaltose injection  What is this medicine?  FERRIC CARBOXYMALTOSE (ferr-ik car-box-ee-mol-toes) is an iron complex. Iron is used to make healthy red blood cells, which carry oxygen and nutrients throughout the body. This medicine is used to treat anemia in people with chronic kidney disease or people who cannot take iron by mouth.  How should I use this medicine?  This medicine is for infusion into a vein. It is given by a health care professional in a hospital or clinic setting.  Talk to your pediatrician regarding the use of this medicine in children. Special care may be needed.  What side effects may I notice from receiving this medicine?  Side effects that you should report to your doctor or health care professional as soon as possible:  · allergic reactions like skin rash, itching or hives, swelling of the face, lips, or tongue -breathing problems  · changes in blood pressure  · feeling faint or lightheaded, falls  · flushing, sweating, or hot feelings  Side effects that usually do not require medical attention (Report these to your doctor or health care professional if they continue or are bothersome.):  · changes in taste  · constipation  · dizziness  · headache  · nausea  · pain, redness, or irritation at site where injected  · vomiting  What may interact with this medicine?  Do not take this medicine with any of the following medications:  · deferoxamine  · dimercaprol  · other iron products  This medicine may also interact with the following medications:  · chloramphenicol  · deferasirox  What if I  miss a dose?  It is important not to miss your dose. Call your doctor or health care professional if you are unable to keep an appointment.  Where should I keep my medicine?  This drug is given in a hospital or clinic and will not be stored at home.  What should I tell my health care provider before I take this medicine?  They need to know if you have any of these conditions:  · anemia not caused by low iron levels  · high levels of iron in the blood  · liver disease  · an unusual or allergic reaction to iron, other medicines, foods, dyes, or preservatives  · pregnant or trying to get pregnant  · breast-feeding  What should I watch for while using this medicine?  Visit your doctor or health care professional regularly. Tell your doctor if your symptoms do not start to get better or if they get worse. You may need blood work done while you are taking this medicine.  You may need to follow a special diet. Talk to your doctor. Foods that contain iron include: whole grains/cereals, dried fruits, beans, or peas, leafy green vegetables, and organ meats (liver, kidney).  NOTE:This sheet is a summary. It may not cover all possible information. If you have questions about this medicine, talk to your doctor, pharmacist, or health care provider. Copyright© 2017 [employer]        Cyanocobalamin, Vitamin B12 injection  What is this medicine?  CYANOCOBALAMIN (sye an oh koe BAL a min) is a man made form of vitamin B12. Vitamin B12 is used in the growth of healthy blood cells, nerve cells, and proteins in the body. It also helps with the metabolism of fats and carbohydrates. This medicine is used to treat people who can not absorb vitamin B12.  How should I use this medicine?  This medicine is injected into a muscle or deeply under the skin. It is usually given by a health care professional in a clinic or doctor's office. However, your doctor may teach you how to inject yourself. Follow all instructions.  Talk to your  pediatrician regarding the use of this medicine in children. Special care may be needed.  What side effects may I notice from receiving this medicine?  Side effects that you should report to your doctor or health care professional as soon as possible:  · allergic reactions like skin rash, itching or hives, swelling of the face, lips, or tongue  · blue tint to skin  · chest tightness, pain  · difficulty breathing, wheezing  · dizziness  · red, swollen painful area on the leg  Side effects that usually do not require medical attention (report to your doctor or health care professional if they continue or are bothersome):  · diarrhea  · headache  What may interact with this medicine?  · colchicine  · heavy alcohol intake  What if I miss a dose?  If you are given your dose at a clinic or doctor's office, call to reschedule your appointment. If you give your own injections and you miss a dose, take it as soon as you can. If it is almost time for your next dose, take only that dose. Do not take double or extra doses.  Where should I keep my medicine?  Keep out of the reach of children.  Store at room temperature between 15 and 30 degrees C (59 and 85 degrees F). Protect from light. Throw away any unused medicine after the expiration date.  What should I tell my health care provider before I take this medicine?  They need to know if you have any of these conditions:  · kidney disease  · Landon's disease  · megaloblastic anemia  · an unusual or allergic reaction to cyanocobalamin, cobalt, other medicines, foods, dyes, or preservatives  · pregnant or trying to get pregnant  · breast-feeding  What should I watch for while using this medicine?  Visit your doctor or health care professional regularly. You may need blood work done while you are taking this medicine.  You may need to follow a special diet. Talk to your doctor. Limit your alcohol intake and avoid smoking to get the best benefit.  NOTE:This sheet is a summary. It  may not cover all possible information. If you have questions about this medicine, talk to your doctor, pharmacist, or health care provider. Copyright© 2017 [employer]

## 2018-04-02 NOTE — PLAN OF CARE
Problem: Patient Care Overview  Goal: Plan of Care Review  Outcome: Ongoing (interventions implemented as appropriate)  I'm actually feeling much better since my first infusion. I'm hoping after this second I'll feel even better.

## 2018-04-09 ENCOUNTER — OFFICE VISIT (OUTPATIENT)
Dept: PULMONOLOGY | Facility: CLINIC | Age: 38
End: 2018-04-09
Payer: COMMERCIAL

## 2018-04-09 ENCOUNTER — PROCEDURE VISIT (OUTPATIENT)
Dept: PULMONOLOGY | Facility: CLINIC | Age: 38
End: 2018-04-09
Payer: COMMERCIAL

## 2018-04-09 VITALS
DIASTOLIC BLOOD PRESSURE: 68 MMHG | SYSTOLIC BLOOD PRESSURE: 120 MMHG | RESPIRATION RATE: 19 BRPM | BODY MASS INDEX: 36.82 KG/M2 | WEIGHT: 221 LBS | HEART RATE: 99 BPM | HEIGHT: 65 IN | OXYGEN SATURATION: 98 %

## 2018-04-09 DIAGNOSIS — Z87.09 HISTORY OF ASTHMA: Primary | ICD-10-CM

## 2018-04-09 DIAGNOSIS — R06.02 SOB (SHORTNESS OF BREATH): ICD-10-CM

## 2018-04-09 LAB
POST FEF 25 75: 3.58 L/S (ref 2.58–3.86)
POST FET 100: 7.82 S
POST FEV1 FVC: 83 %
POST FEV1: 3.04 L (ref 2.83–3.43)
POST FIF 50: 1.93 L/S
POST FVC: 3.65 L (ref 3.47–4.18)
POST PEF: 5.79 L/S (ref 6.25–8.01)
PRE DLCO: 24.65 ML/MMHG/MIN (ref 23.78–32.07)
PRE ERV: 1.01 L
PRE FEF 25 75: 3.63 L/S (ref 2.58–3.86)
PRE FET 100: 8.42 S
PRE FEV1 FVC: 84 %
PRE FEV1: 3.09 L (ref 2.83–3.43)
PRE FIF 50: 3.13 L/S
PRE FRC PL: 2.6 L (ref 1.86–2.81)
PRE FVC: 3.68 L (ref 3.47–4.18)
PRE KROGHS K: 4.82 1/MIN
PRE PEF: 5.9 L/S (ref 6.25–8.01)
PRE RV: 1.5 L (ref 1.38–2.09)
PRE SVC: 3.68 L
PRE TLC: 5.19 L (ref 4.88–5.65)
PREDICTED DLCO: 27.93 ML/MMHG/MIN (ref 23.78–32.07)
PREDICTED FEV1 FVC: 82.52 % (ref 77.62–87.42)
PREDICTED FEV1: 3.13 L (ref 2.83–3.43)
PREDICTED FRC N2: 2.33 L (ref 1.86–2.81)
PREDICTED FRC PL: 2.33 L (ref 1.86–2.81)
PREDICTED FVC: 3.83 L (ref 3.47–4.18)
PREDICTED RV: 1.74 L (ref 1.38–2.09)
PREDICTED SVC: 3.47 L
PREDICTED TLC: 5.26 L (ref 4.88–5.65)
PROVOCATION PROTOCOL: ABNORMAL

## 2018-04-09 PROCEDURE — 94726 PLETHYSMOGRAPHY LUNG VOLUMES: CPT | Mod: S$GLB,,, | Performed by: INTERNAL MEDICINE

## 2018-04-09 PROCEDURE — 99999 PR PBB SHADOW E&M-EST. PATIENT-LVL III: CPT | Mod: PBBFAC,,, | Performed by: INTERNAL MEDICINE

## 2018-04-09 PROCEDURE — 94060 EVALUATION OF WHEEZING: CPT | Mod: S$GLB,,, | Performed by: INTERNAL MEDICINE

## 2018-04-09 PROCEDURE — 99214 OFFICE O/P EST MOD 30 MIN: CPT | Mod: 25,S$GLB,, | Performed by: INTERNAL MEDICINE

## 2018-04-09 PROCEDURE — 94729 DIFFUSING CAPACITY: CPT | Mod: S$GLB,,, | Performed by: INTERNAL MEDICINE

## 2018-04-09 RX ORDER — ROSUVASTATIN CALCIUM 10 MG/1
10 TABLET, COATED ORAL DAILY
Refills: 5 | COMMUNITY
Start: 2018-03-27

## 2018-04-09 RX ORDER — SITAGLIPTIN 100 MG/1
100 TABLET, FILM COATED ORAL DAILY
Refills: 5 | COMMUNITY
Start: 2018-03-27 | End: 2018-09-27

## 2018-04-09 NOTE — PROGRESS NOTES
Pulmonary Outpatient Follow Up Visit     Subjective:       Patient ID: Shelby Meeks is a 38 y.o. female.    Chief Complaint: Shortness of Breath    HPI     58-year-old female patient initially evaluated for shortness of breath of one to 2 years duration at rest and on exercise, thought to be due to asthma and treated empirically with a  µg 1 puff daily, today she says she stated have some shortness of breath and wheezing.  As prescribed, using albuterol rarely as needed.  No chest pain, no coughing, no wheezing.  No fever no chills.    Review of Systems   Constitutional: Negative for fever and chills.   HENT: Negative for voice change.         Sinus problems status post surgery    Eyes: Negative for redness.   Respiratory: Positive for shortness of breath, dyspnea on extertion and use of rescue inhaler. Negative for chest tightness.    Cardiovascular: Negative for chest pain and palpitations.   Genitourinary: Negative for hematuria.   Endocrine: Diabetes mellitus type 2   Musculoskeletal: Positive for myalgias.   Skin: Negative for rash.   Gastrointestinal: Negative for abdominal pain.   Neurological: Negative for syncope.   Hematological: Negative for adenopathy.   Psychiatric/Behavioral: The patient is not nervous/anxious.         Negative sleep study done prior to bariatric surgery.         Outpatient Encounter Prescriptions as of 4/9/2018   Medication Sig Dispense Refill    ALPRAZolam (XANAX) 0.25 MG tablet TAKE ONE TABLET BY MOUTH EVERY NIGHT AT BEDTIME FOR SLEEP      aspirin (ECOTRIN) 81 MG EC tablet Take 81 mg by mouth.      cetirizine (ZYRTEC) 10 MG tablet Take 10 mg by mouth.      cyclobenzaprine (FLEXERIL) 10 MG tablet ONE TABLET BY MOUTH TWICE DAILY AS NEEDED FOR MUSCLE SPASMS      dicyclomine (BENTYL) 20 mg tablet Take 20 mg by mouth.      eszopiclone 3 mg Tab       FLUoxetine (PROZAC) 20 MG capsule Take 60 mg by mouth once daily.  5     fluticasone-vilanterol (BREO ELLIPTA) 100-25 mcg/dose diskus inhaler Inhale 1 puff into the lungs once daily. Controller 30 each 4    FUSION PLUS 130 mg iron -1,250 mcg Cap Take 1 capsule by mouth once daily.  5    JANUVIA 100 mg Tab Take 100 mg by mouth once daily.  5    linaclotide (LINZESS) 145 mcg Cap capsule Take 1 tablet by mouth.      lisinopril-hydrochlorothiazide (PRINZIDE,ZESTORETIC) 20-12.5 mg per tablet Take 1 tablet by mouth.      medroxyPROGESTERone (DEPO-PROVERA) 150 mg/mL injection Inject 150 mg into the muscle.      metFORMIN (GLUCOPHAGE) 1000 MG tablet Take 1,000 mg by mouth.      montelukast (SINGULAIR) 10 mg tablet Take 10 mg by mouth.      omeprazole (PRILOSEC) 40 MG capsule Take 40 mg by mouth.      PROAIR HFA 90 mcg/actuation inhaler INHALE TWO PUFFS UP TO FOUR TIMES DAILY IF NEEDED FOR QUICK RELIEF ONLY  0    propranolol (INDERAL LA) 60 MG 24 hr capsule Take by mouth.      rosuvastatin (CRESTOR) 10 MG tablet Take 10 mg by mouth once daily.  5    [DISCONTINUED] rosuvastatin (CRESTOR) 20 MG tablet Take 20 mg by mouth.      [DISCONTINUED] SITagliptin (JANUVIA) 50 MG Tab        No facility-administered encounter medications on file as of 4/9/2018.        Objective:          Physical Exam   Constitutional: She is oriented to person, place, and time. She appears well-developed and well-nourished.   HENT:   Head: Normocephalic.   Neck: Neck supple.   Neck scar anterior, remote spine surgery    Pulmonary/Chest: Normal expansion and effort normal. No respiratory distress. She has decreased breath sounds. She has no wheezes. She has no rhonchi.   Abdominal: Soft.   Musculoskeletal: She exhibits no edema.   Lymphadenopathy:     She has no cervical adenopathy.   Neurological: She is alert and oriented to person, place, and time.   Skin: Skin is warm.   Psychiatric: She has a normal mood and affect.       Laboratory    Lab Results   Component Value Date    WBC 9.62 03/09/2018    HGB 11.2 (L)  03/09/2018    HCT 35.2 (L) 03/09/2018    MCV 83 03/09/2018     (H) 03/09/2018       Diagnostic Results:  Chest x-ray personally reviewed no acute findings.  PFT personally reviewed normal    CBC showed no evidence of inferior.  IgE was within normal    Assessment/Plan:   History of asthma    SOB (shortness of breath)  -     2D echo only; Future  -     Stress test, pulmonary; Future; Expected date: 05/28/2018      Continue albuterol as needed, [id number] µg 1 puff daily empirically.    If patient continues to complain of shortness of breath, might proceed with a methacholine challenge test to decide if the patient needs to remain committed to thE controller inhaler or further workup shortness of breath might be needed.     Follow-up in about 7 weeks (around 5/28/2018).    This note was prepared using voice recognition system and is likely to have sound alike errors that may have been overlooked even after proof reading.  Please call me with any questions    Discussed diagnosis, its evaluation, treatment and usual course. All questions answered.    Thank you for the courtesy of participating in the care of this patient    Tanvir Mosher MD

## 2018-04-30 ENCOUNTER — INFUSION (OUTPATIENT)
Dept: INFUSION THERAPY | Facility: HOSPITAL | Age: 38
End: 2018-04-30
Attending: INTERNAL MEDICINE
Payer: COMMERCIAL

## 2018-04-30 DIAGNOSIS — D51.8 OTHER VITAMIN B12 DEFICIENCY ANEMIAS: Primary | ICD-10-CM

## 2018-04-30 PROCEDURE — 63600175 PHARM REV CODE 636 W HCPCS: Performed by: INTERNAL MEDICINE

## 2018-04-30 PROCEDURE — 96372 THER/PROPH/DIAG INJ SC/IM: CPT

## 2018-04-30 RX ORDER — CYANOCOBALAMIN 1000 UG/ML
1000 INJECTION, SOLUTION INTRAMUSCULAR; SUBCUTANEOUS
Status: CANCELLED
Start: 2018-05-01

## 2018-04-30 RX ORDER — CYANOCOBALAMIN 1000 UG/ML
1000 INJECTION, SOLUTION INTRAMUSCULAR; SUBCUTANEOUS
Status: DISCONTINUED | OUTPATIENT
Start: 2018-04-30 | End: 2018-04-30 | Stop reason: HOSPADM

## 2018-04-30 RX ADMIN — CYANOCOBALAMIN 1000 MCG: 1000 INJECTION, SOLUTION INTRAMUSCULAR at 03:04

## 2018-05-24 ENCOUNTER — LAB VISIT (OUTPATIENT)
Dept: LAB | Facility: HOSPITAL | Age: 38
End: 2018-05-24
Attending: INTERNAL MEDICINE
Payer: COMMERCIAL

## 2018-05-24 DIAGNOSIS — D51.8 OTHER VITAMIN B12 DEFICIENCY ANEMIAS: ICD-10-CM

## 2018-05-24 DIAGNOSIS — D50.8 OTHER IRON DEFICIENCY ANEMIAS: ICD-10-CM

## 2018-05-24 LAB
BASOPHILS # BLD AUTO: 0.03 K/UL
BASOPHILS NFR BLD: 0.4 %
DIFFERENTIAL METHOD: ABNORMAL
EOSINOPHIL # BLD AUTO: 0.1 K/UL
EOSINOPHIL NFR BLD: 1.5 %
ERYTHROCYTE [DISTWIDTH] IN BLOOD BY AUTOMATED COUNT: 18.3 %
FERRITIN SERPL-MCNC: 222 NG/ML
HCT VFR BLD AUTO: 37.5 %
HGB BLD-MCNC: 12.8 G/DL
IRON SERPL-MCNC: 69 UG/DL
LYMPHOCYTES # BLD AUTO: 3.1 K/UL
LYMPHOCYTES NFR BLD: 38.5 %
MCH RBC QN AUTO: 30.6 PG
MCHC RBC AUTO-ENTMCNC: 34.1 G/DL
MCV RBC AUTO: 90 FL
MONOCYTES # BLD AUTO: 0.6 K/UL
MONOCYTES NFR BLD: 7.9 %
NEUTROPHILS # BLD AUTO: 4.2 K/UL
NEUTROPHILS NFR BLD: 51.7 %
PLATELET # BLD AUTO: 422 K/UL
PMV BLD AUTO: 9.1 FL
RBC # BLD AUTO: 4.18 M/UL
SATURATED IRON: 17 %
TOTAL IRON BINDING CAPACITY: 403 UG/DL
TRANSFERRIN SERPL-MCNC: 272 MG/DL
VIT B12 SERPL-MCNC: 283 PG/ML
WBC # BLD AUTO: 8.15 K/UL

## 2018-05-24 PROCEDURE — 83540 ASSAY OF IRON: CPT

## 2018-05-24 PROCEDURE — 36415 COLL VENOUS BLD VENIPUNCTURE: CPT

## 2018-05-24 PROCEDURE — 82607 VITAMIN B-12: CPT

## 2018-05-24 PROCEDURE — 82728 ASSAY OF FERRITIN: CPT

## 2018-05-24 PROCEDURE — 85025 COMPLETE CBC W/AUTO DIFF WBC: CPT

## 2018-05-28 ENCOUNTER — INFUSION (OUTPATIENT)
Dept: INFUSION THERAPY | Facility: HOSPITAL | Age: 38
End: 2018-05-28
Attending: INTERNAL MEDICINE
Payer: COMMERCIAL

## 2018-05-28 ENCOUNTER — OFFICE VISIT (OUTPATIENT)
Dept: HEMATOLOGY/ONCOLOGY | Facility: CLINIC | Age: 38
End: 2018-05-28
Payer: COMMERCIAL

## 2018-05-28 VITALS
TEMPERATURE: 99 F | BODY MASS INDEX: 38.79 KG/M2 | SYSTOLIC BLOOD PRESSURE: 107 MMHG | HEIGHT: 65 IN | WEIGHT: 232.81 LBS | OXYGEN SATURATION: 98 % | HEART RATE: 92 BPM | DIASTOLIC BLOOD PRESSURE: 77 MMHG

## 2018-05-28 DIAGNOSIS — Z98.84 S/P BARIATRIC SURGERY: ICD-10-CM

## 2018-05-28 DIAGNOSIS — D50.8 OTHER IRON DEFICIENCY ANEMIAS: Primary | ICD-10-CM

## 2018-05-28 DIAGNOSIS — D51.8 OTHER VITAMIN B12 DEFICIENCY ANEMIAS: ICD-10-CM

## 2018-05-28 DIAGNOSIS — D51.8 OTHER VITAMIN B12 DEFICIENCY ANEMIAS: Primary | ICD-10-CM

## 2018-05-28 PROCEDURE — 63600175 PHARM REV CODE 636 W HCPCS: Performed by: INTERNAL MEDICINE

## 2018-05-28 PROCEDURE — 3008F BODY MASS INDEX DOCD: CPT | Mod: CPTII,S$GLB,, | Performed by: INTERNAL MEDICINE

## 2018-05-28 PROCEDURE — 96372 THER/PROPH/DIAG INJ SC/IM: CPT

## 2018-05-28 PROCEDURE — 99213 OFFICE O/P EST LOW 20 MIN: CPT | Mod: S$GLB,,, | Performed by: INTERNAL MEDICINE

## 2018-05-28 PROCEDURE — 99999 PR PBB SHADOW E&M-EST. PATIENT-LVL IV: CPT | Mod: PBBFAC,,, | Performed by: INTERNAL MEDICINE

## 2018-05-28 RX ORDER — CYANOCOBALAMIN 1000 UG/ML
1000 INJECTION, SOLUTION INTRAMUSCULAR; SUBCUTANEOUS
Status: DISCONTINUED | OUTPATIENT
Start: 2018-05-28 | End: 2018-05-28 | Stop reason: HOSPADM

## 2018-05-28 RX ORDER — CYANOCOBALAMIN 1000 UG/ML
1000 INJECTION, SOLUTION INTRAMUSCULAR; SUBCUTANEOUS
COMMUNITY
End: 2018-09-27

## 2018-05-28 RX ORDER — CYANOCOBALAMIN 1000 UG/ML
1000 INJECTION, SOLUTION INTRAMUSCULAR; SUBCUTANEOUS
Status: CANCELLED
Start: 2018-05-28

## 2018-05-28 RX ADMIN — CYANOCOBALAMIN 1000 MCG: 1000 INJECTION, SOLUTION INTRAMUSCULAR at 03:05

## 2018-05-28 NOTE — PROGRESS NOTES
Subjective:       Patient ID: Shelby Meeks is a 38 y.o. female.    Chief Complaint: No chief complaint on file.    HPI This 38-year-old  lady had a gastric band   in the year 2001 for weight loss and it was reversed in 2006.  She ended up   having a gastric bypass in 2015.     She has not menstruated in many years because of being on Depo-Provera, which   she takes for treatment for treatment/ prevention of endometriosis.      She had  been taking oral iron for several months perior to the initial visit me  without any significant   improvement.  Her CBC showed  a hemoglobin of 11.2 with an MCV of 85, white cell   count of 9620 (normal differential), a platelet count 459,000.     The patient had a serum iron of 40 with a total iron binding capacity of 546 and   a saturated iron of 7 done at [facility] recently.      Shef elt  fatigued.  Lab tests show a low ferritin of 9 and a B12 level kn the lower limits of normal at 231.  She was given IV iron and started in supplemental b12 injections     ALLERGIES:  Codeine.     MEDICATIONS:  See MedCard.     PREVIOUS SURGERIES:  Tonsillectomy, adenoidectomy, sinus surgery for removal of   polyps, 2 spinal surgeries, 2 laparoscopies due to endometriosis, gastric   banding in 2001 and revision in 2006 and gastric bypass in 2015.  She has had a   neck surgery.     SOCIAL HISTORY:  She is single with no children.  She lives in [place].    She used to smoke half a pack a day for 10 years and stopped 2009.  Denies   significant drinking.  She works as a .     FAMILY HISTORY:  Maternal grandfather had lung cancer.  Maternal great aunt had   ovarian cancer.  Father and mother had diabetes.  Father had a heart attack.     PAST MEDICAL HISTORY:  1.  Status post bariatric surgery for morbid obesity.  2.  History of tobacco use.  3.  History of diabetes mellitus.  4.  Iron deficiency anemia.  5.  Endometriosis.  Review of Systems    Constitutional: Negative.    HENT: Negative.    Eyes: Negative.    Respiratory: Negative.  Negative for cough and wheezing.    Cardiovascular: Negative.  Negative for chest pain.   Gastrointestinal: Negative.    Genitourinary: Negative.    Neurological: Negative.    Psychiatric/Behavioral: Negative.        Objective:      Physical Exam   Constitutional: She is oriented to person, place, and time. She appears well-developed. No distress.   HENT:   Head: Normocephalic.   Right Ear: Tympanic membrane, external ear and ear canal normal.   Left Ear: Tympanic membrane, external ear and ear canal normal.   Nose: Nose normal. Right sinus exhibits no maxillary sinus tenderness and no frontal sinus tenderness. Left sinus exhibits no maxillary sinus tenderness and no frontal sinus tenderness.   Mouth/Throat: Oropharynx is clear and moist and mucous membranes are normal.   Teeth normal.  Gums normal.   Eyes: Conjunctivae and lids are normal. Pupils are equal, round, and reactive to light.   Neck: Normal carotid pulses, no hepatojugular reflux and no JVD present. Carotid bruit is not present. No tracheal deviation present. No thyroid mass and no thyromegaly present.   Cardiovascular: Normal rate, regular rhythm, S1 normal, S2 normal, normal heart sounds and intact distal pulses.  Exam reveals no gallop and no friction rub.    No murmur heard.  Carotid exam normal   Pulmonary/Chest: Effort normal and breath sounds normal. No accessory muscle usage. No respiratory distress. She has no wheezes. She has no rales. She exhibits no tenderness.   Abdominal: Soft. Normal appearance. She exhibits no distension and no mass. There is no splenomegaly or hepatomegaly. There is no tenderness. There is no rebound and no guarding.   Musculoskeletal: Normal range of motion. She exhibits no edema or tenderness.        Right hand: Normal.        Left hand: Normal.       Lymphadenopathy:     She has no cervical adenopathy.     She has no axillary  adenopathy.        Right: No inguinal and no supraclavicular adenopathy present.        Left: No inguinal and no supraclavicular adenopathy present.   Neurological: She is alert and oriented to person, place, and time. She has normal strength. No cranial nerve deficit. Coordination normal.   Skin: Skin is warm and dry. No rash noted. She is not diaphoretic. No cyanosis or erythema. No pallor. Nails show no clubbing.   Psychiatric: She has a normal mood and affect. Her behavior is normal. Judgment and thought content normal.       Wt Readings from Last 3 Encounters:   05/28/18 105.6 kg (232 lb 12.9 oz)   04/09/18 100.2 kg (221 lb)   03/23/18 106 kg (233 lb 11 oz)     Temp Readings from Last 3 Encounters:   05/28/18 98.7 °F (37.1 °C) (Oral)   04/02/18 98.5 °F (36.9 °C)   03/23/18 98.7 °F (37.1 °C)     BP Readings from Last 3 Encounters:   05/28/18 107/77   04/09/18 120/68   04/02/18 99/70     Pulse Readings from Last 3 Encounters:   05/28/18 92   04/09/18 99   04/02/18 90       Assessment:       1. Other iron deficiency anemias    2. Other vitamin B12 deficiency anemias    3. S/P bariatric surgery        Plan:       Lab Results   Component Value Date    WBC 8.15 05/24/2018    HGB 12.8 05/24/2018    HCT 37.5 05/24/2018    MCV 90 05/24/2018     (H) 05/24/2018       Lab Results   Component Value Date    IRON 69 05/24/2018    TIBC 403 05/24/2018    FERRITIN 222 05/24/2018     .  Lab Results   Component Value Date    DDSZMBYE62 283 05/24/2018     All parameters are better.Continue b12 injections every month. See me in 4 months with a cbc, ferritin, tibc and    b12 levels.

## 2018-05-29 ENCOUNTER — TELEPHONE (OUTPATIENT)
Dept: FAMILY MEDICINE | Facility: CLINIC | Age: 38
End: 2018-05-29

## 2018-05-29 NOTE — TELEPHONE ENCOUNTER
----- Message from Alejandra Chaudhary sent at 5/29/2018  9:04 AM CDT -----  Contact: pt  Calling about a missed call before appointment this morning and please advise . [phone] (home)

## 2018-05-29 NOTE — TELEPHONE ENCOUNTER
Spoke to pt. Informed pt that she can establish care with Dr Toussaint but dr toussaint does not refill some of her meds. The pt states she needs a dr that will refill all her meds. She told me to cancel her appt so she can find a dr that will prescribe

## 2018-06-25 ENCOUNTER — INFUSION (OUTPATIENT)
Dept: INFUSION THERAPY | Facility: HOSPITAL | Age: 38
End: 2018-06-25
Attending: INTERNAL MEDICINE
Payer: COMMERCIAL

## 2018-06-25 VITALS
RESPIRATION RATE: 18 BRPM | TEMPERATURE: 97 F | OXYGEN SATURATION: 97 % | SYSTOLIC BLOOD PRESSURE: 103 MMHG | DIASTOLIC BLOOD PRESSURE: 72 MMHG | HEART RATE: 84 BPM

## 2018-06-25 DIAGNOSIS — D51.8 OTHER VITAMIN B12 DEFICIENCY ANEMIAS: Primary | ICD-10-CM

## 2018-06-25 PROCEDURE — 63600175 PHARM REV CODE 636 W HCPCS: Performed by: INTERNAL MEDICINE

## 2018-06-25 PROCEDURE — 96372 THER/PROPH/DIAG INJ SC/IM: CPT

## 2018-06-25 RX ORDER — CYANOCOBALAMIN 1000 UG/ML
1000 INJECTION, SOLUTION INTRAMUSCULAR; SUBCUTANEOUS
Status: CANCELLED
Start: 2018-06-25

## 2018-06-25 RX ORDER — CYANOCOBALAMIN 1000 UG/ML
1000 INJECTION, SOLUTION INTRAMUSCULAR; SUBCUTANEOUS
Status: DISCONTINUED | OUTPATIENT
Start: 2018-06-25 | End: 2018-06-25 | Stop reason: HOSPADM

## 2018-06-25 RX ADMIN — CYANOCOBALAMIN 1000 MCG: 1000 INJECTION, SOLUTION INTRAMUSCULAR at 03:06

## 2018-06-25 NOTE — PATIENT INSTRUCTIONS
[facility]  [address]  [phone] phone     [phone] fax  Hours of Operation: Monday- Friday 8:00am- 5:00pm  After hours phone  [phone]  Hematology / Oncology Physicians on call      Dr. Manny Fowler                        Please call with any concerns regarding your appointment today.

## 2018-07-23 ENCOUNTER — INFUSION (OUTPATIENT)
Dept: INFUSION THERAPY | Facility: HOSPITAL | Age: 38
End: 2018-07-23
Attending: INTERNAL MEDICINE
Payer: COMMERCIAL

## 2018-07-23 VITALS
TEMPERATURE: 98 F | OXYGEN SATURATION: 98 % | HEART RATE: 84 BPM | RESPIRATION RATE: 18 BRPM | SYSTOLIC BLOOD PRESSURE: 109 MMHG | DIASTOLIC BLOOD PRESSURE: 73 MMHG

## 2018-07-23 DIAGNOSIS — D51.8 OTHER VITAMIN B12 DEFICIENCY ANEMIAS: Primary | ICD-10-CM

## 2018-07-23 PROCEDURE — 96372 THER/PROPH/DIAG INJ SC/IM: CPT

## 2018-07-23 PROCEDURE — 63600175 PHARM REV CODE 636 W HCPCS: Performed by: INTERNAL MEDICINE

## 2018-07-23 RX ORDER — CYANOCOBALAMIN 1000 UG/ML
1000 INJECTION, SOLUTION INTRAMUSCULAR; SUBCUTANEOUS
Status: DISCONTINUED | OUTPATIENT
Start: 2018-07-23 | End: 2018-07-23 | Stop reason: HOSPADM

## 2018-07-23 RX ORDER — CYANOCOBALAMIN 1000 UG/ML
1000 INJECTION, SOLUTION INTRAMUSCULAR; SUBCUTANEOUS
Status: CANCELLED
Start: 2018-07-23

## 2018-07-23 RX ADMIN — CYANOCOBALAMIN 1000 MCG: 1000 INJECTION, SOLUTION INTRAMUSCULAR at 03:07

## 2018-07-23 NOTE — PATIENT INSTRUCTIONS
[facility]  [address]  [phone] phone     [phone] fax  Hours of Operation: Monday- Friday 8:00am- 5:00pm  After hours phone  [phone]  Hematology / Oncology Physicians on call      Dr. Manny Fowler                        Please call with any concerns regarding your appointment today.

## 2018-08-20 ENCOUNTER — INFUSION (OUTPATIENT)
Dept: INFUSION THERAPY | Facility: HOSPITAL | Age: 38
End: 2018-08-20
Attending: INTERNAL MEDICINE
Payer: COMMERCIAL

## 2018-08-20 DIAGNOSIS — D51.8 OTHER VITAMIN B12 DEFICIENCY ANEMIAS: Primary | ICD-10-CM

## 2018-08-20 PROCEDURE — 63600175 PHARM REV CODE 636 W HCPCS: Performed by: INTERNAL MEDICINE

## 2018-08-20 PROCEDURE — 96372 THER/PROPH/DIAG INJ SC/IM: CPT

## 2018-08-20 RX ORDER — CYANOCOBALAMIN 1000 UG/ML
1000 INJECTION, SOLUTION INTRAMUSCULAR; SUBCUTANEOUS
Status: DISCONTINUED | OUTPATIENT
Start: 2018-08-20 | End: 2018-08-20 | Stop reason: HOSPADM

## 2018-08-20 RX ORDER — CYANOCOBALAMIN 1000 UG/ML
1000 INJECTION, SOLUTION INTRAMUSCULAR; SUBCUTANEOUS
Status: CANCELLED
Start: 2018-08-20

## 2018-08-20 RX ADMIN — CYANOCOBALAMIN 1000 MCG: 1000 INJECTION, SOLUTION INTRAMUSCULAR at 03:08

## 2018-09-24 ENCOUNTER — LAB VISIT (OUTPATIENT)
Dept: LAB | Facility: HOSPITAL | Age: 38
End: 2018-09-24
Attending: INTERNAL MEDICINE
Payer: COMMERCIAL

## 2018-09-24 DIAGNOSIS — Z98.84 S/P BARIATRIC SURGERY: ICD-10-CM

## 2018-09-24 DIAGNOSIS — D51.8 OTHER VITAMIN B12 DEFICIENCY ANEMIAS: ICD-10-CM

## 2018-09-24 DIAGNOSIS — D50.8 OTHER IRON DEFICIENCY ANEMIAS: ICD-10-CM

## 2018-09-24 LAB
BASOPHILS # BLD AUTO: 0.02 K/UL
BASOPHILS NFR BLD: 0.3 %
DIFFERENTIAL METHOD: ABNORMAL
EOSINOPHIL # BLD AUTO: 0.1 K/UL
EOSINOPHIL NFR BLD: 1.4 %
ERYTHROCYTE [DISTWIDTH] IN BLOOD BY AUTOMATED COUNT: 12.7 %
HCT VFR BLD AUTO: 39.4 %
HGB BLD-MCNC: 12.8 G/DL
LYMPHOCYTES # BLD AUTO: 2.3 K/UL
LYMPHOCYTES NFR BLD: 31.6 %
MCH RBC QN AUTO: 31.1 PG
MCHC RBC AUTO-ENTMCNC: 32.5 G/DL
MCV RBC AUTO: 96 FL
MONOCYTES # BLD AUTO: 0.6 K/UL
MONOCYTES NFR BLD: 7.7 %
NEUTROPHILS # BLD AUTO: 4.3 K/UL
NEUTROPHILS NFR BLD: 59 %
PLATELET # BLD AUTO: 384 K/UL
PMV BLD AUTO: 8.9 FL
RBC # BLD AUTO: 4.11 M/UL
VIT B12 SERPL-MCNC: 600 PG/ML
WBC # BLD AUTO: 7.3 K/UL

## 2018-09-24 PROCEDURE — 85025 COMPLETE CBC W/AUTO DIFF WBC: CPT

## 2018-09-24 PROCEDURE — 82607 VITAMIN B-12: CPT

## 2018-09-24 PROCEDURE — 82728 ASSAY OF FERRITIN: CPT

## 2018-09-24 PROCEDURE — 36415 COLL VENOUS BLD VENIPUNCTURE: CPT

## 2018-09-25 LAB — FERRITIN SERPL-MCNC: 191 NG/ML

## 2018-09-26 NOTE — PROGRESS NOTES
Subjective:       Patient ID: Shelby Meeks is a 38 y.o. female.    Chief Complaint: B12 Deficiency and Anemia    HPI: Follow-up for iron def anemia and b12 deficiency    Last b12 injection 8/20/18    Last visit was with Dr. Evan Carroll on 5/28/18- this is my first time to evaluate the pt.     38-year-old  lady had a gastric band in the year 2001 for weight loss and it was reversed in 2006.  She ended up having a gastric bypass in 2015.     She has not menstruated in many years because of being on Depo-Provera, which   she takes for treatment for treatment/ prevention of endometriosis.      She had  been taking oral iron for several months prior to the initial visit with Dr. Carroll  without any significant improvement.  Low iron and b12 levels were noted and she was given IV iron and started in supplemental b12 injections    Last IV iron was injectafer 3/23/18 and 4/2/18    Review of Systems   Constitutional: Negative.  Negative for appetite change and unexpected weight change.   HENT: Negative.    Eyes: Negative.  Negative for visual disturbance.   Respiratory: Negative.  Negative for cough and shortness of breath.    Cardiovascular: Negative.  Negative for chest pain.   Gastrointestinal: Negative.  Negative for abdominal pain and diarrhea.        Having reflux- had gb surgery in August and continues to have epigastric discomfort- seeing specialist for evaluation and was noted to have hiatal hernia   Endocrine: Negative.    Genitourinary: Negative.  Negative for frequency.   Musculoskeletal: Negative.  Negative for back pain.   Skin: Negative.  Negative for rash.   Allergic/Immunologic: Negative.    Neurological: Negative.    Hematological: Negative.  Negative for adenopathy.   Psychiatric/Behavioral: Negative.  The patient is not nervous/anxious.        Objective:      Physical Exam   Constitutional: She is oriented to person, place, and time. She appears well-developed and well-nourished.    HENT:   Head: Normocephalic and atraumatic.   Right Ear: External ear normal.   Left Ear: External ear normal.   Mouth/Throat: No oropharyngeal exudate.   Eyes: Conjunctivae and EOM are normal. Pupils are equal, round, and reactive to light. Right eye exhibits no discharge. Left eye exhibits no discharge. No scleral icterus.   Neck: Normal range of motion. Neck supple. No thyromegaly present.   Cardiovascular: Normal rate, regular rhythm and normal heart sounds.   Pulmonary/Chest: Effort normal and breath sounds normal.   Abdominal: Soft. Bowel sounds are normal.   Musculoskeletal: Normal range of motion. She exhibits no edema.        Right shoulder: She exhibits no crepitus and normal strength.   Lymphadenopathy:        Head (right side): No submental, no submandibular, no tonsillar, no preauricular, no posterior auricular and no occipital adenopathy present.        Head (left side): No submental, no submandibular, no tonsillar, no preauricular, no posterior auricular and no occipital adenopathy present.     She has no cervical adenopathy.        Right cervical: No superficial cervical and no posterior cervical adenopathy present.       Left cervical: No superficial cervical and no posterior cervical adenopathy present.     She has no axillary adenopathy.        Right: No supraclavicular adenopathy present.        Left: No supraclavicular adenopathy present.   Neurological: She is alert and oriented to person, place, and time. She has normal reflexes.   Skin: Skin is warm and dry. No rash noted. No erythema. No pallor.   Psychiatric: She has a normal mood and affect. Her behavior is normal. Judgment and thought content normal.         LABS:  Results for CHANTEL APPIAH (MRN [id number]) as of 9/26/2018 17:29   Ref. Range 5/24/2018 15:21 9/24/2018 15:40   WBC Latest Ref Range: 3.90 - 12.70 K/uL 8.15 7.30   RBC Latest Ref Range: 4.00 - 5.40 M/uL 4.18 4.11   Hemoglobin Latest Ref Range: 12.0 - 16.0 g/dL 12.8 12.8    Hematocrit Latest Ref Range: 37.0 - 48.5 % 37.5 39.4   MCV Latest Ref Range: 82 - 98 fL 90 96   MCH Latest Ref Range: 27.0 - 31.0 pg 30.6 31.1 (H)   MCHC Latest Ref Range: 32.0 - 36.0 g/dL 34.1 32.5   RDW Latest Ref Range: 11.5 - 14.5 % 18.3 (H) 12.7   Platelets Latest Ref Range: 150 - 350 K/uL 422 (H) 384 (H)   MPV Latest Ref Range: 9.2 - 12.9 fL 9.1 (L) 8.9 (L)   Gran% Latest Ref Range: 38.0 - 73.0 % 51.7 59.0   Gran # (ANC) Latest Ref Range: 1.8 - 7.7 K/uL 4.2 4.3   Lymph% Latest Ref Range: 18.0 - 48.0 % 38.5 31.6   Lymph # Latest Ref Range: 1.0 - 4.8 K/uL 3.1 2.3   Mono% Latest Ref Range: 4.0 - 15.0 % 7.9 7.7   Mono # Latest Ref Range: 0.3 - 1.0 K/uL 0.6 0.6   Eosinophil% Latest Ref Range: 0.0 - 8.0 % 1.5 1.4   Eos # Latest Ref Range: 0.0 - 0.5 K/uL 0.1 0.1   Basophil% Latest Ref Range: 0.0 - 1.9 % 0.4 0.3   Baso # Latest Ref Range: 0.00 - 0.20 K/uL 0.03 0.02   Iron Latest Ref Range: 30 - 160 ug/dL 69    TIBC Latest Ref Range: 250 - 450 ug/dL 403    Saturated Iron Latest Ref Range: 20 - 50 % 17 (L)    Transferrin Latest Ref Range: 200 - 375 mg/dL 272    Ferritin Latest Ref Range: 20.0 - 300.0 ng/mL 222 191   Vitamin B-12 Latest Ref Range: 210 - 950 pg/mL 283 600     Assessment:       1. Other iron deficiency anemias    2. Other vitamin B12 deficiency anemias    3. S/P bariatric surgery        Plan:     1.      Stable hgb and improving b12 level-   2.      Continue monthly b12 and rtc in 3 months with cbc, cmp, b12, b6, b1, vit k , vit a, vit d, iron, ferritin and tibc, selinium one week prior to visit with me for review  3.       Encouraged eating iron rich foods- call for any weakness or fatigue out of the ordinary.

## 2018-09-27 ENCOUNTER — OFFICE VISIT (OUTPATIENT)
Dept: HEMATOLOGY/ONCOLOGY | Facility: CLINIC | Age: 38
End: 2018-09-27
Payer: COMMERCIAL

## 2018-09-27 ENCOUNTER — INFUSION (OUTPATIENT)
Dept: INFUSION THERAPY | Facility: HOSPITAL | Age: 38
End: 2018-09-27
Attending: INTERNAL MEDICINE
Payer: COMMERCIAL

## 2018-09-27 VITALS
HEIGHT: 65 IN | OXYGEN SATURATION: 98 % | HEART RATE: 88 BPM | BODY MASS INDEX: 38.28 KG/M2 | WEIGHT: 229.75 LBS | TEMPERATURE: 98 F

## 2018-09-27 DIAGNOSIS — D51.8 OTHER VITAMIN B12 DEFICIENCY ANEMIAS: Primary | ICD-10-CM

## 2018-09-27 DIAGNOSIS — Z98.84 S/P BARIATRIC SURGERY: ICD-10-CM

## 2018-09-27 DIAGNOSIS — D51.8 OTHER VITAMIN B12 DEFICIENCY ANEMIAS: ICD-10-CM

## 2018-09-27 DIAGNOSIS — D50.8 OTHER IRON DEFICIENCY ANEMIAS: Primary | ICD-10-CM

## 2018-09-27 PROCEDURE — 99999 PR PBB SHADOW E&M-EST. PATIENT-LVL IV: CPT | Mod: PBBFAC,,, | Performed by: NURSE PRACTITIONER

## 2018-09-27 PROCEDURE — 99213 OFFICE O/P EST LOW 20 MIN: CPT | Mod: S$GLB,,, | Performed by: NURSE PRACTITIONER

## 2018-09-27 PROCEDURE — 63600175 PHARM REV CODE 636 W HCPCS: Performed by: NURSE PRACTITIONER

## 2018-09-27 PROCEDURE — 3008F BODY MASS INDEX DOCD: CPT | Mod: CPTII,S$GLB,, | Performed by: NURSE PRACTITIONER

## 2018-09-27 PROCEDURE — 96372 THER/PROPH/DIAG INJ SC/IM: CPT

## 2018-09-27 RX ORDER — ONDANSETRON HYDROCHLORIDE 8 MG/1
8 TABLET, FILM COATED ORAL EVERY 8 HOURS PRN
Refills: 0 | COMMUNITY
Start: 2018-08-17 | End: 2018-12-20

## 2018-09-27 RX ORDER — HEPARIN 100 UNIT/ML
500 SYRINGE INTRAVENOUS
Status: CANCELLED | OUTPATIENT
Start: 2018-09-27

## 2018-09-27 RX ORDER — SODIUM CHLORIDE 0.9 % (FLUSH) 0.9 %
10 SYRINGE (ML) INJECTION
Status: CANCELLED | OUTPATIENT
Start: 2018-09-27

## 2018-09-27 RX ORDER — ESZOPICLONE 3 MG/1
TABLET, FILM COATED ORAL
COMMUNITY
Start: 2016-05-04

## 2018-09-27 RX ORDER — CYANOCOBALAMIN 1000 UG/ML
1000 INJECTION, SOLUTION INTRAMUSCULAR; SUBCUTANEOUS
Status: DISCONTINUED | OUTPATIENT
Start: 2018-09-27 | End: 2018-09-27 | Stop reason: HOSPADM

## 2018-09-27 RX ORDER — ROSUVASTATIN CALCIUM 10 MG/1
TABLET, COATED ORAL
COMMUNITY
Start: 2018-07-25 | End: 2018-09-27

## 2018-09-27 RX ORDER — ALPRAZOLAM 0.25 MG/1
TABLET ORAL
COMMUNITY

## 2018-09-27 RX ORDER — CYCLOBENZAPRINE HCL 10 MG
TABLET ORAL
COMMUNITY
End: 2018-12-20

## 2018-09-27 RX ORDER — METFORMIN HYDROCHLORIDE 1000 MG/1
TABLET ORAL
COMMUNITY

## 2018-09-27 RX ORDER — PROPRANOLOL HYDROCHLORIDE 60 MG/1
CAPSULE, EXTENDED RELEASE ORAL
COMMUNITY
Start: 2016-03-09 | End: 2018-09-27

## 2018-09-27 RX ORDER — CYANOCOBALAMIN 1000 UG/ML
1000 INJECTION, SOLUTION INTRAMUSCULAR; SUBCUTANEOUS
COMMUNITY
End: 2020-09-08

## 2018-09-27 RX ORDER — MONTELUKAST SODIUM 10 MG/1
TABLET ORAL
COMMUNITY
End: 2018-09-27

## 2018-09-27 RX ORDER — CYANOCOBALAMIN 1000 UG/ML
1000 INJECTION, SOLUTION INTRAMUSCULAR; SUBCUTANEOUS
Status: CANCELLED
Start: 2018-09-27

## 2018-09-27 RX ORDER — FLUOXETINE HYDROCHLORIDE 20 MG/1
CAPSULE ORAL
COMMUNITY
End: 2021-02-17

## 2018-09-27 RX ORDER — OMEPRAZOLE 20 MG/1
40 CAPSULE, DELAYED RELEASE ORAL DAILY
COMMUNITY
End: 2018-12-20

## 2018-09-27 RX ADMIN — CYANOCOBALAMIN 1000 MCG: 1000 INJECTION, SOLUTION INTRAMUSCULAR at 04:09

## 2018-09-27 NOTE — PATIENT INSTRUCTIONS
[facility]  [address]  [address] [address]  [phone] phone     [phone] fax  Hours of Operation: Monday- Friday 8:00am- 5:00pm  After hours phone  [phone]  Hematology / Oncology Physicians on call      Dr. Manny Fowler      Please call with any concerns regarding your appointment today.

## 2018-10-25 ENCOUNTER — INFUSION (OUTPATIENT)
Dept: INFUSION THERAPY | Facility: HOSPITAL | Age: 38
End: 2018-10-25
Attending: INTERNAL MEDICINE
Payer: COMMERCIAL

## 2018-10-25 VITALS — HEIGHT: 65 IN | BODY MASS INDEX: 38.28 KG/M2 | WEIGHT: 229.75 LBS

## 2018-10-25 DIAGNOSIS — D51.8 OTHER VITAMIN B12 DEFICIENCY ANEMIAS: Primary | ICD-10-CM

## 2018-10-25 PROCEDURE — 96372 THER/PROPH/DIAG INJ SC/IM: CPT

## 2018-10-25 PROCEDURE — 63600175 PHARM REV CODE 636 W HCPCS: Performed by: NURSE PRACTITIONER

## 2018-10-25 RX ORDER — CYANOCOBALAMIN 1000 UG/ML
1000 INJECTION, SOLUTION INTRAMUSCULAR; SUBCUTANEOUS
Status: DISCONTINUED | OUTPATIENT
Start: 2018-10-25 | End: 2018-10-25 | Stop reason: HOSPADM

## 2018-10-25 RX ORDER — HEPARIN 100 UNIT/ML
500 SYRINGE INTRAVENOUS
Status: CANCELLED | OUTPATIENT
Start: 2018-10-25

## 2018-10-25 RX ORDER — CYANOCOBALAMIN 1000 UG/ML
1000 INJECTION, SOLUTION INTRAMUSCULAR; SUBCUTANEOUS
Status: CANCELLED
Start: 2018-10-25

## 2018-10-25 RX ORDER — SODIUM CHLORIDE 0.9 % (FLUSH) 0.9 %
10 SYRINGE (ML) INJECTION
Status: CANCELLED | OUTPATIENT
Start: 2018-10-25

## 2018-10-25 RX ORDER — PANTOPRAZOLE SODIUM 40 MG/1
40 TABLET, DELAYED RELEASE ORAL 3 TIMES DAILY
COMMUNITY

## 2018-10-25 RX ADMIN — CYANOCOBALAMIN 1000 MCG: 1000 INJECTION, SOLUTION INTRAMUSCULAR at 03:10

## 2018-10-25 NOTE — PATIENT INSTRUCTIONS
[facility]  [address]  [phone] phone     [phone] fax  Hours of Operation: Monday- Friday 8:00am- 5:00pm  After hours phone  [phone]  Hematology / Oncology Physicians on call      Dr. Manny Fowler                        Please call with any concerns regarding your appointment today.

## 2018-10-25 NOTE — NURSING
1502  10/25/18  Injection given without difficulties.Bandaid applied. Patient instructed to stay in the clinic for 15 minutes. Patient verbalized understanding and will notify nurse with any complaints.

## 2018-11-16 ENCOUNTER — INFUSION (OUTPATIENT)
Dept: INFUSION THERAPY | Facility: HOSPITAL | Age: 38
End: 2018-11-16
Attending: INTERNAL MEDICINE
Payer: COMMERCIAL

## 2018-11-16 DIAGNOSIS — D51.8 OTHER VITAMIN B12 DEFICIENCY ANEMIAS: Primary | ICD-10-CM

## 2018-11-16 PROCEDURE — 63600175 PHARM REV CODE 636 W HCPCS: Performed by: NURSE PRACTITIONER

## 2018-11-16 PROCEDURE — 96372 THER/PROPH/DIAG INJ SC/IM: CPT

## 2018-11-16 RX ORDER — CYANOCOBALAMIN 1000 UG/ML
1000 INJECTION, SOLUTION INTRAMUSCULAR; SUBCUTANEOUS
Status: DISCONTINUED | OUTPATIENT
Start: 2018-11-16 | End: 2018-11-16 | Stop reason: HOSPADM

## 2018-11-16 RX ORDER — CYANOCOBALAMIN 1000 UG/ML
1000 INJECTION, SOLUTION INTRAMUSCULAR; SUBCUTANEOUS
Status: CANCELLED
Start: 2018-11-16

## 2018-11-16 RX ORDER — SODIUM CHLORIDE 0.9 % (FLUSH) 0.9 %
10 SYRINGE (ML) INJECTION
Status: CANCELLED | OUTPATIENT
Start: 2018-11-16

## 2018-11-16 RX ORDER — HEPARIN 100 UNIT/ML
500 SYRINGE INTRAVENOUS
Status: CANCELLED | OUTPATIENT
Start: 2018-11-16

## 2018-11-16 RX ADMIN — CYANOCOBALAMIN 1000 MCG: 1000 INJECTION, SOLUTION INTRAMUSCULAR at 03:11

## 2018-12-14 ENCOUNTER — LAB VISIT (OUTPATIENT)
Dept: LAB | Facility: HOSPITAL | Age: 38
End: 2018-12-14
Attending: NURSE PRACTITIONER
Payer: COMMERCIAL

## 2018-12-14 DIAGNOSIS — D51.8 OTHER VITAMIN B12 DEFICIENCY ANEMIAS: ICD-10-CM

## 2018-12-14 DIAGNOSIS — Z98.84 S/P BARIATRIC SURGERY: ICD-10-CM

## 2018-12-14 DIAGNOSIS — D50.8 OTHER IRON DEFICIENCY ANEMIAS: ICD-10-CM

## 2018-12-14 LAB
25(OH)D3+25(OH)D2 SERPL-MCNC: 49 NG/ML
25(OH)D3+25(OH)D2 SERPL-MCNC: 49 NG/ML
ALBUMIN SERPL BCP-MCNC: 4.3 G/DL
ALP SERPL-CCNC: 74 U/L
ALT SERPL W/O P-5'-P-CCNC: 74 U/L
ANION GAP SERPL CALC-SCNC: 12 MMOL/L
AST SERPL-CCNC: 37 U/L
BASOPHILS # BLD AUTO: 0.02 K/UL
BASOPHILS NFR BLD: 0.3 %
BILIRUB SERPL-MCNC: 0.2 MG/DL
BUN SERPL-MCNC: 11 MG/DL
CALCIUM SERPL-MCNC: 10.2 MG/DL
CHLORIDE SERPL-SCNC: 102 MMOL/L
CO2 SERPL-SCNC: 21 MMOL/L
CREAT SERPL-MCNC: 0.7 MG/DL
DIFFERENTIAL METHOD: ABNORMAL
EOSINOPHIL # BLD AUTO: 0.1 K/UL
EOSINOPHIL NFR BLD: 1.2 %
ERYTHROCYTE [DISTWIDTH] IN BLOOD BY AUTOMATED COUNT: 12.6 %
EST. GFR  (AFRICAN AMERICAN): >60 ML/MIN/1.73 M^2
EST. GFR  (NON AFRICAN AMERICAN): >60 ML/MIN/1.73 M^2
FERRITIN SERPL-MCNC: 126 NG/ML
GLUCOSE SERPL-MCNC: 125 MG/DL
HCT VFR BLD AUTO: 42.7 %
HGB BLD-MCNC: 13.9 G/DL
IRON SERPL-MCNC: 74 UG/DL
LYMPHOCYTES # BLD AUTO: 3 K/UL
LYMPHOCYTES NFR BLD: 40.5 %
MCH RBC QN AUTO: 30.5 PG
MCHC RBC AUTO-ENTMCNC: 32.6 G/DL
MCV RBC AUTO: 94 FL
MONOCYTES # BLD AUTO: 0.5 K/UL
MONOCYTES NFR BLD: 6.5 %
NEUTROPHILS # BLD AUTO: 3.8 K/UL
NEUTROPHILS NFR BLD: 51.5 %
PLATELET # BLD AUTO: 398 K/UL
PMV BLD AUTO: 9.3 FL
POTASSIUM SERPL-SCNC: 3.9 MMOL/L
PROT SERPL-MCNC: 7.8 G/DL
RBC # BLD AUTO: 4.55 M/UL
SATURATED IRON: 16 %
SODIUM SERPL-SCNC: 135 MMOL/L
TOTAL IRON BINDING CAPACITY: 469 UG/DL
TRANSFERRIN SERPL-MCNC: 317 MG/DL
VIT B12 SERPL-MCNC: 632 PG/ML
WBC # BLD AUTO: 7.36 K/UL

## 2018-12-14 PROCEDURE — 84207 ASSAY OF VITAMIN B-6: CPT

## 2018-12-14 PROCEDURE — 84590 ASSAY OF VITAMIN A: CPT

## 2018-12-14 PROCEDURE — 80053 COMPREHEN METABOLIC PANEL: CPT

## 2018-12-14 PROCEDURE — 84255 ASSAY OF SELENIUM: CPT

## 2018-12-14 PROCEDURE — 85025 COMPLETE CBC W/AUTO DIFF WBC: CPT

## 2018-12-14 PROCEDURE — 82607 VITAMIN B-12: CPT

## 2018-12-14 PROCEDURE — 84597 ASSAY OF VITAMIN K: CPT

## 2018-12-14 PROCEDURE — 82306 VITAMIN D 25 HYDROXY: CPT

## 2018-12-14 PROCEDURE — 82728 ASSAY OF FERRITIN: CPT

## 2018-12-14 PROCEDURE — 84425 ASSAY OF VITAMIN B-1: CPT

## 2018-12-14 PROCEDURE — 83540 ASSAY OF IRON: CPT

## 2018-12-17 LAB — SELENIUM SERPL-MCNC: 110 UG/L (ref 23–190)

## 2018-12-18 LAB
PYRIDOXAL SERPL-MCNC: 29 UG/L (ref 5–50)
VIT A SERPL-MCNC: 88 UG/DL (ref 38–106)
VIT B1 BLD-MCNC: 102 UG/L (ref 38–122)

## 2018-12-20 ENCOUNTER — OFFICE VISIT (OUTPATIENT)
Dept: HEMATOLOGY/ONCOLOGY | Facility: CLINIC | Age: 38
End: 2018-12-20
Payer: COMMERCIAL

## 2018-12-20 ENCOUNTER — LAB VISIT (OUTPATIENT)
Dept: LAB | Facility: HOSPITAL | Age: 38
End: 2018-12-20
Attending: NURSE PRACTITIONER
Payer: COMMERCIAL

## 2018-12-20 ENCOUNTER — INFUSION (OUTPATIENT)
Dept: INFUSION THERAPY | Facility: HOSPITAL | Age: 38
End: 2018-12-20
Attending: INTERNAL MEDICINE
Payer: COMMERCIAL

## 2018-12-20 VITALS
HEART RATE: 78 BPM | TEMPERATURE: 98 F | HEIGHT: 65 IN | WEIGHT: 236.31 LBS | SYSTOLIC BLOOD PRESSURE: 118 MMHG | OXYGEN SATURATION: 99 % | DIASTOLIC BLOOD PRESSURE: 68 MMHG | BODY MASS INDEX: 39.37 KG/M2 | RESPIRATION RATE: 16 BRPM

## 2018-12-20 DIAGNOSIS — D50.8 OTHER IRON DEFICIENCY ANEMIAS: ICD-10-CM

## 2018-12-20 DIAGNOSIS — D50.8 OTHER IRON DEFICIENCY ANEMIAS: Primary | ICD-10-CM

## 2018-12-20 DIAGNOSIS — D51.8 OTHER VITAMIN B12 DEFICIENCY ANEMIAS: ICD-10-CM

## 2018-12-20 DIAGNOSIS — R53.82 CHRONIC FATIGUE: ICD-10-CM

## 2018-12-20 DIAGNOSIS — D51.8 OTHER VITAMIN B12 DEFICIENCY ANEMIAS: Primary | ICD-10-CM

## 2018-12-20 DIAGNOSIS — Z98.84 S/P BARIATRIC SURGERY: ICD-10-CM

## 2018-12-20 DIAGNOSIS — D75.839 THROMBOCYTOSIS: ICD-10-CM

## 2018-12-20 LAB
CRP SERPL-MCNC: 0.6 MG/L
FOLATE SERPL-MCNC: 14.9 NG/ML
LDH SERPL L TO P-CCNC: 168 U/L
TSH SERPL DL<=0.005 MIU/L-ACNC: 0.66 UIU/ML

## 2018-12-20 PROCEDURE — 84443 ASSAY THYROID STIM HORMONE: CPT

## 2018-12-20 PROCEDURE — 81403 MOPATH PROCEDURE LEVEL 4: CPT

## 2018-12-20 PROCEDURE — 99213 OFFICE O/P EST LOW 20 MIN: CPT | Mod: S$GLB,,, | Performed by: NURSE PRACTITIONER

## 2018-12-20 PROCEDURE — 81403 MOPATH PROCEDURE LEVEL 4: CPT | Mod: 91

## 2018-12-20 PROCEDURE — 36415 COLL VENOUS BLD VENIPUNCTURE: CPT

## 2018-12-20 PROCEDURE — 81270 JAK2 GENE: CPT

## 2018-12-20 PROCEDURE — 86140 C-REACTIVE PROTEIN: CPT

## 2018-12-20 PROCEDURE — 99999 PR PBB SHADOW E&M-EST. PATIENT-LVL IV: CPT | Mod: PBBFAC,,, | Performed by: NURSE PRACTITIONER

## 2018-12-20 PROCEDURE — 81219 CALR GENE COM VARIANTS: CPT

## 2018-12-20 PROCEDURE — 3008F BODY MASS INDEX DOCD: CPT | Mod: CPTII,S$GLB,, | Performed by: NURSE PRACTITIONER

## 2018-12-20 PROCEDURE — 96372 THER/PROPH/DIAG INJ SC/IM: CPT

## 2018-12-20 PROCEDURE — 83615 LACTATE (LD) (LDH) ENZYME: CPT

## 2018-12-20 PROCEDURE — 82746 ASSAY OF FOLIC ACID SERUM: CPT

## 2018-12-20 PROCEDURE — 63600175 PHARM REV CODE 636 W HCPCS: Performed by: NURSE PRACTITIONER

## 2018-12-20 RX ORDER — TIZANIDINE 4 MG/1
2 TABLET ORAL
COMMUNITY
Start: 2018-12-04 | End: 2020-06-30

## 2018-12-20 RX ORDER — HEPARIN 100 UNIT/ML
500 SYRINGE INTRAVENOUS
Status: CANCELLED | OUTPATIENT
Start: 2018-12-20

## 2018-12-20 RX ORDER — LISINOPRIL 20 MG/1
20 TABLET ORAL DAILY
Refills: 11 | COMMUNITY
Start: 2018-11-20 | End: 2022-01-03 | Stop reason: SDUPTHER

## 2018-12-20 RX ORDER — CYANOCOBALAMIN 1000 UG/ML
1000 INJECTION, SOLUTION INTRAMUSCULAR; SUBCUTANEOUS
Status: CANCELLED
Start: 2018-12-20

## 2018-12-20 RX ORDER — METOPROLOL SUCCINATE 25 MG/1
25 TABLET, EXTENDED RELEASE ORAL NIGHTLY
Refills: 11 | COMMUNITY
Start: 2018-11-20 | End: 2020-09-08 | Stop reason: ALTCHOICE

## 2018-12-20 RX ORDER — CYANOCOBALAMIN 1000 UG/ML
1000 INJECTION, SOLUTION INTRAMUSCULAR; SUBCUTANEOUS
Status: DISCONTINUED | OUTPATIENT
Start: 2018-12-20 | End: 2018-12-20 | Stop reason: HOSPADM

## 2018-12-20 RX ORDER — SODIUM CHLORIDE 0.9 % (FLUSH) 0.9 %
10 SYRINGE (ML) INJECTION
Status: CANCELLED | OUTPATIENT
Start: 2018-12-20

## 2018-12-20 RX ADMIN — CYANOCOBALAMIN 1000 MCG: 1000 INJECTION, SOLUTION INTRAMUSCULAR at 03:12

## 2018-12-20 NOTE — DISCHARGE INSTRUCTIONS
[facility]  [address]  [phone] phone     [phone] fax  Hours of Operation: Monday- Friday 8:00am- 5:00pm  After hours phone  [phone]  Hematology / Oncology Physicians on call      Dr. Manny Fowler                        Please call with any concerns regarding your appointment today.

## 2018-12-21 LAB — PHYTONADIONE SERPL-MCNC: 1.4 NMOL/L (ref 0.22–4.88)

## 2018-12-28 LAB
JAK2 EXON 12 MUTATION DETECTION BLOOD: NORMAL
PATH REPORT.FINAL DX SPEC: NORMAL

## 2018-12-31 LAB
MPNR  FINAL DIAGNOSIS: NORMAL
MPNR  SPECIMEN TYPE: NORMAL
MPNR RESULT: NORMAL

## 2019-01-02 DIAGNOSIS — Z98.84 S/P BARIATRIC SURGERY: Primary | ICD-10-CM

## 2019-01-02 DIAGNOSIS — D51.3 OTHER DIETARY VITAMIN B12 DEFICIENCY ANEMIA: ICD-10-CM

## 2019-01-02 DIAGNOSIS — D50.8 IRON DEFICIENCY ANEMIA SECONDARY TO INADEQUATE DIETARY IRON INTAKE: ICD-10-CM

## 2019-01-21 ENCOUNTER — INFUSION (OUTPATIENT)
Dept: INFUSION THERAPY | Facility: HOSPITAL | Age: 39
End: 2019-01-21
Attending: INTERNAL MEDICINE
Payer: COMMERCIAL

## 2019-01-21 DIAGNOSIS — D51.8 OTHER VITAMIN B12 DEFICIENCY ANEMIAS: Primary | ICD-10-CM

## 2019-01-21 PROCEDURE — 96372 THER/PROPH/DIAG INJ SC/IM: CPT

## 2019-01-21 PROCEDURE — 63600175 PHARM REV CODE 636 W HCPCS: Performed by: NURSE PRACTITIONER

## 2019-01-21 RX ORDER — CYANOCOBALAMIN 1000 UG/ML
1000 INJECTION, SOLUTION INTRAMUSCULAR; SUBCUTANEOUS
Status: DISCONTINUED | OUTPATIENT
Start: 2019-01-21 | End: 2019-01-21 | Stop reason: HOSPADM

## 2019-01-21 RX ORDER — HEPARIN 100 UNIT/ML
500 SYRINGE INTRAVENOUS
Status: CANCELLED | OUTPATIENT
Start: 2019-01-21

## 2019-01-21 RX ORDER — SODIUM CHLORIDE 0.9 % (FLUSH) 0.9 %
10 SYRINGE (ML) INJECTION
Status: CANCELLED | OUTPATIENT
Start: 2019-01-21

## 2019-01-21 RX ORDER — CYANOCOBALAMIN 1000 UG/ML
1000 INJECTION, SOLUTION INTRAMUSCULAR; SUBCUTANEOUS
Status: CANCELLED
Start: 2019-01-21

## 2019-01-21 RX ADMIN — CYANOCOBALAMIN 1000 MCG: 1000 INJECTION, SOLUTION INTRAMUSCULAR at 09:01

## 2019-01-21 NOTE — DISCHARGE INSTRUCTIONS
[facility]  [address]  [phone] phone     [phone] fax  Hours of Operation: Monday- Friday 8:00am- 5:00pm  After hours phone  [phone]  Hematology / Oncology Physicians on call      Dr. Manny Fowler                        Please call with any concerns regarding your appointment today.

## 2019-02-21 ENCOUNTER — INFUSION (OUTPATIENT)
Dept: INFUSION THERAPY | Facility: HOSPITAL | Age: 39
End: 2019-02-21
Attending: INTERNAL MEDICINE
Payer: COMMERCIAL

## 2019-02-21 VITALS
DIASTOLIC BLOOD PRESSURE: 95 MMHG | TEMPERATURE: 98 F | HEART RATE: 81 BPM | SYSTOLIC BLOOD PRESSURE: 131 MMHG | RESPIRATION RATE: 18 BRPM | OXYGEN SATURATION: 98 %

## 2019-02-21 DIAGNOSIS — D51.8 OTHER VITAMIN B12 DEFICIENCY ANEMIAS: Primary | ICD-10-CM

## 2019-02-21 PROCEDURE — 63600175 PHARM REV CODE 636 W HCPCS: Performed by: NURSE PRACTITIONER

## 2019-02-21 PROCEDURE — 96372 THER/PROPH/DIAG INJ SC/IM: CPT

## 2019-02-21 RX ORDER — CYANOCOBALAMIN 1000 UG/ML
1000 INJECTION, SOLUTION INTRAMUSCULAR; SUBCUTANEOUS
Status: DISCONTINUED | OUTPATIENT
Start: 2019-02-21 | End: 2019-02-21 | Stop reason: HOSPADM

## 2019-02-21 RX ORDER — SODIUM CHLORIDE 0.9 % (FLUSH) 0.9 %
10 SYRINGE (ML) INJECTION
Status: CANCELLED | OUTPATIENT
Start: 2019-02-21

## 2019-02-21 RX ORDER — CYANOCOBALAMIN 1000 UG/ML
1000 INJECTION, SOLUTION INTRAMUSCULAR; SUBCUTANEOUS
Status: CANCELLED
Start: 2019-02-21

## 2019-02-21 RX ORDER — HEPARIN 100 UNIT/ML
500 SYRINGE INTRAVENOUS
Status: CANCELLED | OUTPATIENT
Start: 2019-02-21

## 2019-02-21 RX ADMIN — CYANOCOBALAMIN 1000 MCG: 1000 INJECTION, SOLUTION INTRAMUSCULAR at 03:02

## 2019-03-14 ENCOUNTER — LAB VISIT (OUTPATIENT)
Dept: LAB | Facility: HOSPITAL | Age: 39
End: 2019-03-14
Attending: OBSTETRICS & GYNECOLOGY
Payer: COMMERCIAL

## 2019-03-14 DIAGNOSIS — D50.8 IRON DEFICIENCY ANEMIA SECONDARY TO INADEQUATE DIETARY IRON INTAKE: ICD-10-CM

## 2019-03-14 DIAGNOSIS — D51.3 OTHER DIETARY VITAMIN B12 DEFICIENCY ANEMIA: ICD-10-CM

## 2019-03-14 DIAGNOSIS — Z98.84 S/P BARIATRIC SURGERY: ICD-10-CM

## 2019-03-14 LAB
ALBUMIN SERPL BCP-MCNC: 3.6 G/DL
ALP SERPL-CCNC: 80 U/L
ALT SERPL W/O P-5'-P-CCNC: 58 U/L
ANION GAP SERPL CALC-SCNC: 13 MMOL/L
AST SERPL-CCNC: 41 U/L
BASOPHILS # BLD AUTO: 0.03 K/UL
BASOPHILS NFR BLD: 0.4 %
BILIRUB SERPL-MCNC: 0.1 MG/DL
BUN SERPL-MCNC: 11 MG/DL
CALCIUM SERPL-MCNC: 9.4 MG/DL
CHLORIDE SERPL-SCNC: 105 MMOL/L
CO2 SERPL-SCNC: 17 MMOL/L
CREAT SERPL-MCNC: 0.9 MG/DL
DIFFERENTIAL METHOD: ABNORMAL
EOSINOPHIL # BLD AUTO: 0.1 K/UL
EOSINOPHIL NFR BLD: 1.8 %
ERYTHROCYTE [DISTWIDTH] IN BLOOD BY AUTOMATED COUNT: 13.1 %
EST. GFR  (AFRICAN AMERICAN): >60 ML/MIN/1.73 M^2
EST. GFR  (NON AFRICAN AMERICAN): >60 ML/MIN/1.73 M^2
FERRITIN SERPL-MCNC: 59 NG/ML
GLUCOSE SERPL-MCNC: 322 MG/DL
HCT VFR BLD AUTO: 39.3 %
HGB BLD-MCNC: 12.4 G/DL
IRON SERPL-MCNC: 40 UG/DL
LYMPHOCYTES # BLD AUTO: 2.6 K/UL
LYMPHOCYTES NFR BLD: 37.6 %
MCH RBC QN AUTO: 30 PG
MCHC RBC AUTO-ENTMCNC: 31.6 G/DL
MCV RBC AUTO: 95 FL
MONOCYTES # BLD AUTO: 0.6 K/UL
MONOCYTES NFR BLD: 8.1 %
NEUTROPHILS # BLD AUTO: 3.6 K/UL
NEUTROPHILS NFR BLD: 52.1 %
PLATELET # BLD AUTO: 355 K/UL
PMV BLD AUTO: 9.4 FL
POTASSIUM SERPL-SCNC: 4.2 MMOL/L
PROT SERPL-MCNC: 6.8 G/DL
RBC # BLD AUTO: 4.14 M/UL
SATURATED IRON: 10 %
SODIUM SERPL-SCNC: 135 MMOL/L
TOTAL IRON BINDING CAPACITY: 401 UG/DL
TRANSFERRIN SERPL-MCNC: 271 MG/DL
VIT B12 SERPL-MCNC: 591 PG/ML
WBC # BLD AUTO: 6.83 K/UL

## 2019-03-14 PROCEDURE — 36415 COLL VENOUS BLD VENIPUNCTURE: CPT

## 2019-03-14 PROCEDURE — 82607 VITAMIN B-12: CPT

## 2019-03-14 PROCEDURE — 80053 COMPREHEN METABOLIC PANEL: CPT

## 2019-03-14 PROCEDURE — 82728 ASSAY OF FERRITIN: CPT

## 2019-03-14 PROCEDURE — 83540 ASSAY OF IRON: CPT

## 2019-03-14 PROCEDURE — 85025 COMPLETE CBC W/AUTO DIFF WBC: CPT

## 2019-03-20 NOTE — PROGRESS NOTES
Subjective:       Patient ID: Shelby Meeks is a 39 y.o. female.    Chief Complaint: Iron deficiency anemia    HPI: Follow-up for iron def anemia and b12 deficiency s/p bariatric surgery    Last b12 injection 2/21/19    Last visit was with Dr. Evan Carroll on 5/28/18-  38- year-old  lady had a gastric band in the year 2001 for weight loss and it was reversed in 2006.  She ended up having a gastric bypass in 2015.     She has not menstruated in many years because of being on Depo-Provera, which   she takes for treatment for treatment/ prevention of endometriosis.      She had  been taking oral iron for several months prior to the initial visit with Dr. Carroll  without any significant improvement.  Low iron and b12 levels were noted and she was given IV iron and started on supplemental b12 injections    Last IV iron was injectafer 3/23/18 and 4/2/18    Pt has her gallblader removed in August   Elevated liver enzymes noted since July 2018 on [facility] labs  Pt had CT of abd/pelvis with contrast in the past 6 mons that was wnl,     6/22/18 abdominal ultrasound revealed fatty liver    Negative hep b and c labs 11/2018 at [facility]    Pt had another ultrasound of her abd at [facility] last week and has not heard the results- has appt with Dr. Roberts her gastro doctor 4/15/19 for review of imaging and labs.     Review of Systems   Constitutional: Positive for fatigue. Negative for appetite change, fever and unexpected weight change.   HENT: Negative.    Eyes: Negative.  Negative for visual disturbance.   Respiratory: Negative.  Negative for cough and shortness of breath.    Cardiovascular: Negative.  Negative for chest pain.   Gastrointestinal: Negative.  Negative for abdominal pain and diarrhea.        Having reflux- had gb surgery in August and continues to have epigastric discomfort- seeing specialist for evaluation and was noted to have hiatal hernia   Endocrine: Negative.    Genitourinary: Negative.  Negative for  frequency.   Musculoskeletal: Negative.  Negative for back pain.   Skin: Negative.  Negative for rash.   Allergic/Immunologic: Negative.    Neurological: Negative.    Hematological: Negative.  Negative for adenopathy.   Psychiatric/Behavioral: Negative.  The patient is not nervous/anxious.    Pt admits to drinking only diet soda- no water during the day    Objective:      Physical Exam   Constitutional: She is oriented to person, place, and time. She appears well-developed and well-nourished.   HENT:   Head: Normocephalic and atraumatic.   Right Ear: External ear normal.   Left Ear: External ear normal.   Mouth/Throat: No oropharyngeal exudate.   Eyes: Conjunctivae and EOM are normal. Pupils are equal, round, and reactive to light. Right eye exhibits no discharge. Left eye exhibits no discharge. No scleral icterus.   Neck: Normal range of motion. Neck supple. No thyromegaly present.   Cardiovascular: Normal rate, regular rhythm and normal heart sounds.   Pulmonary/Chest: Effort normal and breath sounds normal. Right breast exhibits no inverted nipple, no mass, no nipple discharge, no skin change and no tenderness. Left breast exhibits no inverted nipple, no mass, no nipple discharge, no skin change and no tenderness.   Abdominal: Soft. Bowel sounds are normal.   Musculoskeletal: Normal range of motion. She exhibits no edema.        Right shoulder: She exhibits no crepitus and normal strength.   Lymphadenopathy:        Head (right side): No submental, no submandibular, no tonsillar, no preauricular, no posterior auricular and no occipital adenopathy present.        Head (left side): No submental, no submandibular, no tonsillar, no preauricular, no posterior auricular and no occipital adenopathy present.     She has no cervical adenopathy.        Right cervical: No superficial cervical and no posterior cervical adenopathy present.       Left cervical: No superficial cervical and no posterior cervical adenopathy  present.     She has no axillary adenopathy.        Right: No supraclavicular adenopathy present.        Left: No supraclavicular adenopathy present.   Neurological: She is alert and oriented to person, place, and time. She has normal reflexes.   Skin: Skin is warm and dry. No rash noted. No erythema. No pallor.   Psychiatric: She has a normal mood and affect. Her behavior is normal. Judgment and thought content normal.         LABS:  Results for CHANTEL APPIAH (MRN [id number]) as of 3/20/2019 15:53   Ref. Range 12/14/2018 15:27 12/20/2018 15:04 3/14/2019 14:58   WBC Latest Ref Range: 3.90 - 12.70 K/uL 7.36  6.83   RBC Latest Ref Range: 4.00 - 5.40 M/uL 4.55  4.14   Hemoglobin Latest Ref Range: 12.0 - 16.0 g/dL 13.9  12.4   Hematocrit Latest Ref Range: 37.0 - 48.5 % 42.7  39.3   MCV Latest Ref Range: 82 - 98 fL 94  95   MCH Latest Ref Range: 27.0 - 31.0 pg 30.5  30.0   MCHC Latest Ref Range: 32.0 - 36.0 g/dL 32.6  31.6 (L)   RDW Latest Ref Range: 11.5 - 14.5 % 12.6  13.1   Platelets Latest Ref Range: 150 - 350 K/uL 398 (H)  355 (H)   MPV Latest Ref Range: 9.2 - 12.9 fL 9.3  9.4   Gran% Latest Ref Range: 38.0 - 73.0 % 51.5  52.1   Gran # (ANC) Latest Ref Range: 1.8 - 7.7 K/uL 3.8  3.6   Lymph% Latest Ref Range: 18.0 - 48.0 % 40.5  37.6   Lymph # Latest Ref Range: 1.0 - 4.8 K/uL 3.0  2.6   Mono% Latest Ref Range: 4.0 - 15.0 % 6.5  8.1   Mono # Latest Ref Range: 0.3 - 1.0 K/uL 0.5  0.6   Eosinophil% Latest Ref Range: 0.0 - 8.0 % 1.2  1.8   Eos # Latest Ref Range: 0.0 - 0.5 K/uL 0.1  0.1   Basophil% Latest Ref Range: 0.0 - 1.9 % 0.3  0.4   Baso # Latest Ref Range: 0.00 - 0.20 K/uL 0.02  0.03   Differential Method Unknown Automated  Automated   Iron Latest Ref Range: 30 - 160 ug/dL 74  40   TIBC Latest Ref Range: 250 - 450 ug/dL 469 (H)  401   Saturated Iron Latest Ref Range: 20 - 50 % 16 (L)  10 (L)   Transferrin Latest Ref Range: 200 - 375 mg/dL 317  271   Ferritin Latest Ref Range: 20.0 - 300.0 ng/mL 126  59    Folate Latest Ref Range: 4.0 - 24.0 ng/mL  14.9    Vitamin B-12 Latest Ref Range: 210 - 950 pg/mL 632  591   Results for CHANTEL APPIAH (MRN [id number]) as of 3/20/2019 15:53   Ref. Range 3/14/2019 14:58   Sodium Latest Ref Range: 136 - 145 mmol/L 135 (L)   Potassium Latest Ref Range: 3.5 - 5.1 mmol/L 4.2   Chloride Latest Ref Range: 95 - 110 mmol/L 105   CO2 Latest Ref Range: 23 - 29 mmol/L 17 (L)   Anion Gap Latest Ref Range: 8 - 16 mmol/L 13   BUN, Bld Latest Ref Range: 6 - 20 mg/dL 11   Creatinine Latest Ref Range: 0.5 - 1.4 mg/dL 0.9   eGFR if non African American Latest Ref Range: >60 mL/min/1.73 m^2 >60   eGFR if  Latest Ref Range: >60 mL/min/1.73 m^2 >60   Glucose Latest Ref Range: 70 - 110 mg/dL 322 (H)   Calcium Latest Ref Range: 8.7 - 10.5 mg/dL 9.4   Alkaline Phosphatase Latest Ref Range: 55 - 135 U/L 80   Total Protein Latest Ref Range: 6.0 - 8.4 g/dL 6.8   Albumin Latest Ref Range: 3.5 - 5.2 g/dL 3.6   Total Bilirubin Latest Ref Range: 0.1 - 1.0 mg/dL 0.1   AST Latest Ref Range: 10 - 40 U/L 41 (H)   ALT Latest Ref Range: 10 - 44 U/L 58 (H)   Vitamin B-12 Latest Ref Range: 210 - 950 pg/mL 591       Assessment:       1. Other iron deficiency anemias    2. Other vitamin B12 deficiency anemias    3. Elevated liver enzymes        Plan:     1.      Hgb normal, plts elevated but decreasing- workup of elevated platelets negative for BRYANNA mutation   2.      B12 level is normal - Continue monthly b12 and rtc in 3 -4 months with cbc, cmp, b12, iron, ferritin and tibc, one week prior to visit with me for review- taking multivitamin bid  3.       Iron levels are normal but declining- Encouraged eating iron rich foods- call for any weakness or fatigue out of the ordinary.   4.       Elevated liver enzymes noted on recent labs and noted since July 2018 at [facility]-    Hep B and C labs at [facility] -11/2018 - negative   CT of abd and pelvis with contrast wnl 7/26/18   Hida scan normal  7/9/18   Ultrasound of gallbladder revealed fatty liver 6/22/18    Followed by Dr. Roberts and has appt on 4/15/19 for review of recent imaging    Pt does take tylenol and a statin daily but not excessive tylenol- has been on statin for years with no issues. Pt will show her gastro doctor our recent labs as well through the pt portal. Pt will message us with visit outcome.

## 2019-03-21 ENCOUNTER — INFUSION (OUTPATIENT)
Dept: INFUSION THERAPY | Facility: HOSPITAL | Age: 39
End: 2019-03-21
Attending: INTERNAL MEDICINE
Payer: COMMERCIAL

## 2019-03-21 ENCOUNTER — OFFICE VISIT (OUTPATIENT)
Dept: HEMATOLOGY/ONCOLOGY | Facility: CLINIC | Age: 39
End: 2019-03-21
Payer: COMMERCIAL

## 2019-03-21 VITALS
BODY MASS INDEX: 38.89 KG/M2 | OXYGEN SATURATION: 98 % | WEIGHT: 233.44 LBS | TEMPERATURE: 98 F | SYSTOLIC BLOOD PRESSURE: 108 MMHG | HEIGHT: 65 IN | HEART RATE: 88 BPM | DIASTOLIC BLOOD PRESSURE: 72 MMHG

## 2019-03-21 DIAGNOSIS — D51.8 OTHER VITAMIN B12 DEFICIENCY ANEMIAS: Primary | ICD-10-CM

## 2019-03-21 DIAGNOSIS — D51.8 OTHER VITAMIN B12 DEFICIENCY ANEMIAS: ICD-10-CM

## 2019-03-21 DIAGNOSIS — R74.8 ELEVATED LIVER ENZYMES: ICD-10-CM

## 2019-03-21 DIAGNOSIS — D50.8 OTHER IRON DEFICIENCY ANEMIAS: Primary | ICD-10-CM

## 2019-03-21 PROCEDURE — 63600175 PHARM REV CODE 636 W HCPCS: Performed by: NURSE PRACTITIONER

## 2019-03-21 PROCEDURE — 99999 PR PBB SHADOW E&M-EST. PATIENT-LVL V: ICD-10-PCS | Mod: PBBFAC,,, | Performed by: NURSE PRACTITIONER

## 2019-03-21 PROCEDURE — 3008F PR BODY MASS INDEX (BMI) DOCUMENTED: ICD-10-PCS | Mod: CPTII,S$GLB,, | Performed by: NURSE PRACTITIONER

## 2019-03-21 PROCEDURE — 96372 THER/PROPH/DIAG INJ SC/IM: CPT

## 2019-03-21 PROCEDURE — 99214 OFFICE O/P EST MOD 30 MIN: CPT | Mod: S$GLB,,, | Performed by: NURSE PRACTITIONER

## 2019-03-21 PROCEDURE — 99214 PR OFFICE/OUTPT VISIT, EST, LEVL IV, 30-39 MIN: ICD-10-PCS | Mod: S$GLB,,, | Performed by: NURSE PRACTITIONER

## 2019-03-21 PROCEDURE — 3008F BODY MASS INDEX DOCD: CPT | Mod: CPTII,S$GLB,, | Performed by: NURSE PRACTITIONER

## 2019-03-21 PROCEDURE — 99999 PR PBB SHADOW E&M-EST. PATIENT-LVL V: CPT | Mod: PBBFAC,,, | Performed by: NURSE PRACTITIONER

## 2019-03-21 RX ORDER — BUTALBITAL, ACETAMINOPHEN AND CAFFEINE 300; 40; 50 MG/1; MG/1; MG/1
1 CAPSULE ORAL EVERY 6 HOURS PRN
Refills: 0 | COMMUNITY
Start: 2019-01-30 | End: 2020-06-01 | Stop reason: SDUPTHER

## 2019-03-21 RX ORDER — SUCRALFATE 1 G/1
1 TABLET ORAL 3 TIMES DAILY
Refills: 2 | COMMUNITY
Start: 2019-03-11 | End: 2022-09-06

## 2019-03-21 RX ORDER — HEPARIN 100 UNIT/ML
500 SYRINGE INTRAVENOUS
Status: CANCELLED | OUTPATIENT
Start: 2019-03-21

## 2019-03-21 RX ORDER — NAPROXEN 500 MG/1
500 TABLET ORAL 2 TIMES DAILY
Refills: 0 | COMMUNITY
Start: 2019-01-30 | End: 2020-09-08

## 2019-03-21 RX ORDER — SODIUM CHLORIDE 0.9 % (FLUSH) 0.9 %
10 SYRINGE (ML) INJECTION
Status: CANCELLED | OUTPATIENT
Start: 2019-03-21

## 2019-03-21 RX ORDER — FLUTICASONE PROPIONATE 50 MCG
SPRAY, SUSPENSION (ML) NASAL
COMMUNITY
End: 2020-09-08

## 2019-03-21 RX ORDER — CYANOCOBALAMIN 1000 UG/ML
1000 INJECTION, SOLUTION INTRAMUSCULAR; SUBCUTANEOUS
Status: CANCELLED
Start: 2019-03-21

## 2019-03-21 RX ORDER — SUMATRIPTAN SUCCINATE 25 MG/1
TABLET ORAL
Refills: 1 | COMMUNITY
Start: 2019-01-30 | End: 2020-06-01 | Stop reason: SDUPTHER

## 2019-03-21 RX ORDER — GABAPENTIN 300 MG/1
300 CAPSULE ORAL
COMMUNITY
Start: 2019-03-05 | End: 2020-12-18 | Stop reason: SDUPTHER

## 2019-03-21 RX ORDER — CYANOCOBALAMIN 1000 UG/ML
1000 INJECTION, SOLUTION INTRAMUSCULAR; SUBCUTANEOUS
Status: DISCONTINUED | OUTPATIENT
Start: 2019-03-21 | End: 2019-03-21 | Stop reason: HOSPADM

## 2019-03-21 RX ADMIN — CYANOCOBALAMIN 1000 MCG: 1000 INJECTION, SOLUTION INTRAMUSCULAR at 03:03

## 2019-03-21 NOTE — DISCHARGE INSTRUCTIONS
[facility]  [address] [address]  [phone] phone     [phone] fax  Hours of Operation: Monday- Friday 8:00am- 5:00pm  After hours phone  [phone]  Hematology / Oncology Physicians on call      Dr. Manny Ruvalcaba    Please call with any concerns regarding your appointment today.

## 2019-03-21 NOTE — NURSING
Injection given without difficulties. Bandaid applied. Patient instructed to stay in the clinic for 15 minutes. Patient verbalized understanding and will notify nurse with any complaints.

## 2019-04-22 ENCOUNTER — INFUSION (OUTPATIENT)
Dept: INFUSION THERAPY | Facility: HOSPITAL | Age: 39
End: 2019-04-22
Attending: INTERNAL MEDICINE
Payer: COMMERCIAL

## 2019-04-22 VITALS
WEIGHT: 232.81 LBS | SYSTOLIC BLOOD PRESSURE: 117 MMHG | TEMPERATURE: 98 F | DIASTOLIC BLOOD PRESSURE: 78 MMHG | HEART RATE: 83 BPM | BODY MASS INDEX: 38.74 KG/M2 | OXYGEN SATURATION: 98 % | RESPIRATION RATE: 18 BRPM

## 2019-04-22 DIAGNOSIS — D51.8 OTHER VITAMIN B12 DEFICIENCY ANEMIAS: Primary | ICD-10-CM

## 2019-04-22 PROCEDURE — 96372 THER/PROPH/DIAG INJ SC/IM: CPT

## 2019-04-22 PROCEDURE — 63600175 PHARM REV CODE 636 W HCPCS: Performed by: NURSE PRACTITIONER

## 2019-04-22 RX ORDER — CYANOCOBALAMIN 1000 UG/ML
1000 INJECTION, SOLUTION INTRAMUSCULAR; SUBCUTANEOUS
Status: DISCONTINUED | OUTPATIENT
Start: 2019-04-22 | End: 2019-04-22 | Stop reason: HOSPADM

## 2019-04-22 RX ORDER — HEPARIN 100 UNIT/ML
500 SYRINGE INTRAVENOUS
Status: CANCELLED | OUTPATIENT
Start: 2019-05-01

## 2019-04-22 RX ORDER — CYANOCOBALAMIN 1000 UG/ML
1000 INJECTION, SOLUTION INTRAMUSCULAR; SUBCUTANEOUS
Status: CANCELLED
Start: 2019-05-01

## 2019-04-22 RX ORDER — SODIUM CHLORIDE 0.9 % (FLUSH) 0.9 %
10 SYRINGE (ML) INJECTION
Status: CANCELLED | OUTPATIENT
Start: 2019-05-01

## 2019-04-22 RX ADMIN — CYANOCOBALAMIN 1000 MCG: 1000 INJECTION, SOLUTION INTRAMUSCULAR at 01:04

## 2019-04-22 NOTE — PATIENT INSTRUCTIONS
Cyanocobalamin, Vitamin B12 injection  What is this medicine?  CYANOCOBALAMIN (sye an oh koe BAL a min) is a man made form of vitamin B12. Vitamin B12 is used in the growth of healthy blood cells, nerve cells, and proteins in the body. It also helps with the metabolism of fats and carbohydrates. This medicine is used to treat people who can not absorb vitamin B12.  How should I use this medicine?  This medicine is injected into a muscle or deeply under the skin. It is usually given by a health care professional in a clinic or doctor's office. However, your doctor may teach you how to inject yourself. Follow all instructions.  Talk to your pediatrician regarding the use of this medicine in children. Special care may be needed.  What side effects may I notice from receiving this medicine?  Side effects that you should report to your doctor or health care professional as soon as possible:  · allergic reactions like skin rash, itching or hives, swelling of the face, lips, or tongue  · blue tint to skin  · chest tightness, pain  · difficulty breathing, wheezing  · dizziness  · red, swollen painful area on the leg  Side effects that usually do not require medical attention (report to your doctor or health care professional if they continue or are bothersome):  · diarrhea  · headache  What may interact with this medicine?  · colchicine  · heavy alcohol intake  What if I miss a dose?  If you are given your dose at a clinic or doctor's office, call to reschedule your appointment. If you give your own injections and you miss a dose, take it as soon as you can. If it is almost time for your next dose, take only that dose. Do not take double or extra doses.  Where should I keep my medicine?  Keep out of the reach of children.  Store at room temperature between 15 and 30 degrees C (59 and 85 degrees F). Protect from light. Throw away any unused medicine after the expiration date.  What should I tell my health care provider  before I take this medicine?  They need to know if you have any of these conditions:  · kidney disease  · Landon's disease  · megaloblastic anemia  · an unusual or allergic reaction to cyanocobalamin, cobalt, other medicines, foods, dyes, or preservatives  · pregnant or trying to get pregnant  · breast-feeding  What should I watch for while using this medicine?  Visit your doctor or health care professional regularly. You may need blood work done while you are taking this medicine.  You may need to follow a special diet. Talk to your doctor. Limit your alcohol intake and avoid smoking to get the best benefit.  NOTE:This sheet is a summary. It may not cover all possible information. If you have questions about this medicine, talk to your doctor, pharmacist, or health care provider. Copyright© 2017 [employer]

## 2019-05-16 ENCOUNTER — INFUSION (OUTPATIENT)
Dept: INFUSION THERAPY | Facility: HOSPITAL | Age: 39
End: 2019-05-16
Attending: NURSE PRACTITIONER
Payer: COMMERCIAL

## 2019-05-16 DIAGNOSIS — D51.8 OTHER VITAMIN B12 DEFICIENCY ANEMIAS: Primary | ICD-10-CM

## 2019-05-16 PROCEDURE — 96372 THER/PROPH/DIAG INJ SC/IM: CPT

## 2019-05-16 PROCEDURE — 63600175 PHARM REV CODE 636 W HCPCS: Performed by: NURSE PRACTITIONER

## 2019-05-16 RX ORDER — CYANOCOBALAMIN 1000 UG/ML
1000 INJECTION, SOLUTION INTRAMUSCULAR; SUBCUTANEOUS
Status: DISCONTINUED | OUTPATIENT
Start: 2019-05-16 | End: 2019-05-16 | Stop reason: HOSPADM

## 2019-05-16 RX ORDER — CYANOCOBALAMIN 1000 UG/ML
1000 INJECTION, SOLUTION INTRAMUSCULAR; SUBCUTANEOUS
Status: CANCELLED
Start: 2019-06-01

## 2019-05-16 RX ORDER — SODIUM CHLORIDE 0.9 % (FLUSH) 0.9 %
10 SYRINGE (ML) INJECTION
Status: CANCELLED | OUTPATIENT
Start: 2019-06-01

## 2019-05-16 RX ORDER — HEPARIN 100 UNIT/ML
500 SYRINGE INTRAVENOUS
Status: CANCELLED | OUTPATIENT
Start: 2019-06-01

## 2019-05-16 RX ADMIN — CYANOCOBALAMIN 1000 MCG: 1000 INJECTION, SOLUTION INTRAMUSCULAR at 03:05

## 2019-06-05 ENCOUNTER — LAB VISIT (OUTPATIENT)
Dept: LAB | Facility: HOSPITAL | Age: 39
End: 2019-06-05
Attending: NURSE PRACTITIONER
Payer: COMMERCIAL

## 2019-06-05 DIAGNOSIS — D50.8 OTHER IRON DEFICIENCY ANEMIAS: ICD-10-CM

## 2019-06-05 DIAGNOSIS — R74.8 ELEVATED LIVER ENZYMES: ICD-10-CM

## 2019-06-05 DIAGNOSIS — D51.8 OTHER VITAMIN B12 DEFICIENCY ANEMIAS: ICD-10-CM

## 2019-06-05 LAB
ALBUMIN SERPL BCP-MCNC: 4.1 G/DL (ref 3.5–5.2)
ALP SERPL-CCNC: 74 U/L (ref 55–135)
ALT SERPL W/O P-5'-P-CCNC: 49 U/L (ref 10–44)
ANION GAP SERPL CALC-SCNC: 10 MMOL/L (ref 8–16)
AST SERPL-CCNC: 32 U/L (ref 10–40)
BASOPHILS # BLD AUTO: 0.03 K/UL (ref 0–0.2)
BASOPHILS NFR BLD: 0.4 % (ref 0–1.9)
BILIRUB SERPL-MCNC: 0.2 MG/DL (ref 0.1–1)
BUN SERPL-MCNC: 6 MG/DL (ref 6–20)
CALCIUM SERPL-MCNC: 10 MG/DL (ref 8.7–10.5)
CHLORIDE SERPL-SCNC: 107 MMOL/L (ref 95–110)
CO2 SERPL-SCNC: 22 MMOL/L (ref 23–29)
CREAT SERPL-MCNC: 0.7 MG/DL (ref 0.5–1.4)
DIFFERENTIAL METHOD: ABNORMAL
EOSINOPHIL # BLD AUTO: 0.2 K/UL (ref 0–0.5)
EOSINOPHIL NFR BLD: 2.3 % (ref 0–8)
ERYTHROCYTE [DISTWIDTH] IN BLOOD BY AUTOMATED COUNT: 12.6 % (ref 11.5–14.5)
EST. GFR  (AFRICAN AMERICAN): >60 ML/MIN/1.73 M^2
EST. GFR  (NON AFRICAN AMERICAN): >60 ML/MIN/1.73 M^2
FERRITIN SERPL-MCNC: 27 NG/ML (ref 20–300)
GLUCOSE SERPL-MCNC: 126 MG/DL (ref 70–110)
HCT VFR BLD AUTO: 40.6 % (ref 37–48.5)
HGB BLD-MCNC: 12.9 G/DL (ref 12–16)
IRON SERPL-MCNC: 78 UG/DL (ref 30–160)
LYMPHOCYTES # BLD AUTO: 2.7 K/UL (ref 1–4.8)
LYMPHOCYTES NFR BLD: 36.8 % (ref 18–48)
MCH RBC QN AUTO: 29.9 PG (ref 27–31)
MCHC RBC AUTO-ENTMCNC: 31.8 G/DL (ref 32–36)
MCV RBC AUTO: 94 FL (ref 82–98)
MONOCYTES # BLD AUTO: 0.5 K/UL (ref 0.3–1)
MONOCYTES NFR BLD: 6.9 % (ref 4–15)
NEUTROPHILS # BLD AUTO: 3.9 K/UL (ref 1.8–7.7)
NEUTROPHILS NFR BLD: 53.6 % (ref 38–73)
PLATELET # BLD AUTO: 398 K/UL (ref 150–350)
PMV BLD AUTO: 9.8 FL (ref 9.2–12.9)
POTASSIUM SERPL-SCNC: 3.9 MMOL/L (ref 3.5–5.1)
PROT SERPL-MCNC: 7 G/DL (ref 6–8.4)
RBC # BLD AUTO: 4.31 M/UL (ref 4–5.4)
SATURATED IRON: 17 % (ref 20–50)
SODIUM SERPL-SCNC: 139 MMOL/L (ref 136–145)
TOTAL IRON BINDING CAPACITY: 465 UG/DL (ref 250–450)
TRANSFERRIN SERPL-MCNC: 314 MG/DL (ref 200–375)
VIT B12 SERPL-MCNC: 508 PG/ML (ref 210–950)
WBC # BLD AUTO: 7.34 K/UL (ref 3.9–12.7)

## 2019-06-05 PROCEDURE — 36415 COLL VENOUS BLD VENIPUNCTURE: CPT

## 2019-06-05 PROCEDURE — 85025 COMPLETE CBC W/AUTO DIFF WBC: CPT

## 2019-06-05 PROCEDURE — 82728 ASSAY OF FERRITIN: CPT

## 2019-06-05 PROCEDURE — 83540 ASSAY OF IRON: CPT

## 2019-06-05 PROCEDURE — 82607 VITAMIN B-12: CPT

## 2019-06-05 PROCEDURE — 80053 COMPREHEN METABOLIC PANEL: CPT

## 2019-06-06 ENCOUNTER — PATIENT MESSAGE (OUTPATIENT)
Dept: HEMATOLOGY/ONCOLOGY | Facility: CLINIC | Age: 39
End: 2019-06-06

## 2019-06-12 NOTE — PROGRESS NOTES
Subjective:       Patient ID: Shelby Meeks is a 39 y.o. female.    Chief Complaint: Iron def anemia    HPI: Follow-up for iron def anemia and b12 deficiency s/p bariatric surgery    Last b12 injection 2/21/19    Last visit was with Dr. Evan Carroll on 5/28/18-  38- year-old  lady had a gastric band in the year 2001 for weight loss and it was reversed in 2006.  She ended up having a gastric bypass in 2015.     Patient had partial hyst with unil ooph 6/10/19     She had  been taking oral iron for several months prior to the initial visit with Dr. Carroll  without any significant improvement.  Low iron and b12 levels were noted and she was given IV iron and started on supplemental b12 injections    Last IV iron was injectafer 3/23/18 and 4/2/18    Pt has her gallblader removed in August   Elevated liver enzymes noted since July 2018 on [facility] labs  Pt had CT of abd/pelvis with contrast in the past year that was wnl,     6/22/18 abdominal ultrasound revealed fatty liver    Negative hep b and c labs 11/2018 at [facility]    Pt had another ultrasound of her abd at [facility] April 2018 - fatty liver disease persists and is followed by Dr. Roberts.     Review of Systems   Constitutional: Positive for fatigue. Negative for appetite change, fever and unexpected weight change.   HENT: Negative.    Eyes: Negative.  Negative for visual disturbance.   Respiratory: Negative.  Negative for cough and shortness of breath.    Cardiovascular: Negative.  Negative for chest pain.   Gastrointestinal: Negative.  Negative for abdominal pain and diarrhea.   Endocrine: Negative.    Genitourinary: Negative.  Negative for frequency.   Musculoskeletal: Negative.  Negative for back pain.   Skin: Negative.  Negative for rash.   Allergic/Immunologic: Negative.    Neurological: Negative.    Hematological: Negative.  Negative for adenopathy.   Psychiatric/Behavioral: Negative.  The patient is not nervous/anxious.    Pt admits to drinking only diet  soda- no water during the day    Objective:      Physical Exam   Constitutional: She is oriented to person, place, and time. She appears well-developed and well-nourished.   HENT:   Head: Normocephalic and atraumatic.   Right Ear: External ear normal.   Left Ear: External ear normal.   Mouth/Throat: No oropharyngeal exudate.   Eyes: Pupils are equal, round, and reactive to light. Conjunctivae and EOM are normal. Right eye exhibits no discharge. Left eye exhibits no discharge. No scleral icterus.   Neck: Normal range of motion. Neck supple. No thyromegaly present.   Cardiovascular: Normal rate, regular rhythm and normal heart sounds.   Pulmonary/Chest: Effort normal and breath sounds normal. Right breast exhibits no inverted nipple, no mass, no nipple discharge, no skin change and no tenderness. Left breast exhibits no inverted nipple, no mass, no nipple discharge, no skin change and no tenderness.   Abdominal: Soft. Bowel sounds are normal.   Musculoskeletal: Normal range of motion. She exhibits no edema.        Right shoulder: She exhibits no crepitus and normal strength.   Lymphadenopathy:        Head (right side): No submental, no submandibular, no tonsillar, no preauricular, no posterior auricular and no occipital adenopathy present.        Head (left side): No submental, no submandibular, no tonsillar, no preauricular, no posterior auricular and no occipital adenopathy present.     She has no cervical adenopathy.        Right cervical: No superficial cervical and no posterior cervical adenopathy present.       Left cervical: No superficial cervical and no posterior cervical adenopathy present.     She has no axillary adenopathy.        Right: No supraclavicular adenopathy present.        Left: No supraclavicular adenopathy present.   Neurological: She is alert and oriented to person, place, and time. She has normal reflexes.   Skin: Skin is warm and dry. No rash noted. No erythema. No pallor.   Psychiatric: She  has a normal mood and affect. Her behavior is normal. Judgment and thought content normal.         LABS:  Results for CHANTEL APPIAH (MRN [id number]) as of 6/12/2019 16:43   Ref. Range 3/14/2019 14:58 6/5/2019 11:50   WBC Latest Ref Range: 3.90 - 12.70 K/uL 6.83 7.34   RBC Latest Ref Range: 4.00 - 5.40 M/uL 4.14 4.31   Hemoglobin Latest Ref Range: 12.0 - 16.0 g/dL 12.4 12.9   Hematocrit Latest Ref Range: 37.0 - 48.5 % 39.3 40.6   MCV Latest Ref Range: 82 - 98 fL 95 94   MCH Latest Ref Range: 27.0 - 31.0 pg 30.0 29.9   MCHC Latest Ref Range: 32.0 - 36.0 g/dL 31.6 (L) 31.8 (L)   RDW Latest Ref Range: 11.5 - 14.5 % 13.1 12.6   Platelets Latest Ref Range: 150 - 350 K/uL 355 (H) 398 (H)   MPV Latest Ref Range: 9.2 - 12.9 fL 9.4 9.8   Gran% Latest Ref Range: 38.0 - 73.0 % 52.1 53.6   Gran # (ANC) Latest Ref Range: 1.8 - 7.7 K/uL 3.6 3.9   Lymph% Latest Ref Range: 18.0 - 48.0 % 37.6 36.8   Lymph # Latest Ref Range: 1.0 - 4.8 K/uL 2.6 2.7   Mono% Latest Ref Range: 4.0 - 15.0 % 8.1 6.9   Mono # Latest Ref Range: 0.3 - 1.0 K/uL 0.6 0.5   Eosinophil% Latest Ref Range: 0.0 - 8.0 % 1.8 2.3   Eos # Latest Ref Range: 0.0 - 0.5 K/uL 0.1 0.2   Basophil% Latest Ref Range: 0.0 - 1.9 % 0.4 0.4   Baso # Latest Ref Range: 0.00 - 0.20 K/uL 0.03 0.03   Differential Method Unknown Automated Automated   Iron Latest Ref Range: 30 - 160 ug/dL 40 78   TIBC Latest Ref Range: 250 - 450 ug/dL 401 465 (H)   Saturated Iron Latest Ref Range: 20 - 50 % 10 (L) 17 (L)   Transferrin Latest Ref Range: 200 - 375 mg/dL 271 314   Ferritin Latest Ref Range: 20.0 - 300.0 ng/mL 59 27   Vitamin B-12 Latest Ref Range: 210 - 950 pg/mL 591 508         Assessment:       1. Other iron deficiency anemias    2. Other vitamin B12 deficiency anemias    3. S/P bariatric surgery        Plan:     1.      Hgb normal, plts elevated but stable- workup of elevated platelets negative for BRYANNA mutation   2.      B12 level is normal - Continue monthly b12 and rtc in 3 -4 months  with cbc, cmp, b12, iron, ferritin and tibc, B1, b6, vit d, selinium, vit A and vit K one week prior to visit with me for review- taking multivitamin bid  3.       Iron levels are normal and improving- Encouraged eating iron rich foods- call for any weakness or fatigue out of the ordinary.   4.       Elevated liver enzymes and fatty liver disease followed by Dr. Roberts - noted since July 2018 at [facility] lab-    Hep B and C labs at [facility] -11/2018 - negative   CT of abd and pelvis with contrast wnl 7/26/18   Hida scan normal 7/9/18   Ultrasound of gallbladder revealed fatty liver 6/22/18    Recommended sublinguinal b12/b vitamins for pt to take daily to see if can wean off b12 injections- pt agrees. Will call if has any changes in stamina. Continue taking multivitamin.

## 2019-06-13 ENCOUNTER — OFFICE VISIT (OUTPATIENT)
Dept: HEMATOLOGY/ONCOLOGY | Facility: CLINIC | Age: 39
End: 2019-06-13
Payer: COMMERCIAL

## 2019-06-13 ENCOUNTER — INFUSION (OUTPATIENT)
Dept: INFUSION THERAPY | Facility: HOSPITAL | Age: 39
End: 2019-06-13
Attending: NURSE PRACTITIONER
Payer: COMMERCIAL

## 2019-06-13 VITALS
BODY MASS INDEX: 40.06 KG/M2 | SYSTOLIC BLOOD PRESSURE: 126 MMHG | HEART RATE: 72 BPM | DIASTOLIC BLOOD PRESSURE: 88 MMHG | OXYGEN SATURATION: 98 % | WEIGHT: 240.75 LBS | TEMPERATURE: 97 F

## 2019-06-13 DIAGNOSIS — D50.8 OTHER IRON DEFICIENCY ANEMIAS: Primary | ICD-10-CM

## 2019-06-13 DIAGNOSIS — Z98.84 S/P BARIATRIC SURGERY: ICD-10-CM

## 2019-06-13 DIAGNOSIS — D51.8 OTHER VITAMIN B12 DEFICIENCY ANEMIAS: ICD-10-CM

## 2019-06-13 DIAGNOSIS — D51.8 OTHER VITAMIN B12 DEFICIENCY ANEMIAS: Primary | ICD-10-CM

## 2019-06-13 PROCEDURE — 99214 OFFICE O/P EST MOD 30 MIN: CPT | Mod: S$GLB,,, | Performed by: NURSE PRACTITIONER

## 2019-06-13 PROCEDURE — 96372 THER/PROPH/DIAG INJ SC/IM: CPT

## 2019-06-13 PROCEDURE — 99999 PR PBB SHADOW E&M-EST. PATIENT-LVL II: ICD-10-PCS | Mod: PBBFAC,,, | Performed by: NURSE PRACTITIONER

## 2019-06-13 PROCEDURE — 99999 PR PBB SHADOW E&M-EST. PATIENT-LVL II: CPT | Mod: PBBFAC,,, | Performed by: NURSE PRACTITIONER

## 2019-06-13 PROCEDURE — 63600175 PHARM REV CODE 636 W HCPCS: Performed by: NURSE PRACTITIONER

## 2019-06-13 PROCEDURE — 3008F BODY MASS INDEX DOCD: CPT | Mod: CPTII,S$GLB,, | Performed by: NURSE PRACTITIONER

## 2019-06-13 PROCEDURE — 3008F PR BODY MASS INDEX (BMI) DOCUMENTED: ICD-10-PCS | Mod: CPTII,S$GLB,, | Performed by: NURSE PRACTITIONER

## 2019-06-13 PROCEDURE — 99214 PR OFFICE/OUTPT VISIT, EST, LEVL IV, 30-39 MIN: ICD-10-PCS | Mod: S$GLB,,, | Performed by: NURSE PRACTITIONER

## 2019-06-13 RX ORDER — SODIUM CHLORIDE 0.9 % (FLUSH) 0.9 %
10 SYRINGE (ML) INJECTION
Status: CANCELLED | OUTPATIENT
Start: 2019-07-01

## 2019-06-13 RX ORDER — HEPARIN 100 UNIT/ML
500 SYRINGE INTRAVENOUS
Status: CANCELLED | OUTPATIENT
Start: 2019-07-01

## 2019-06-13 RX ORDER — CYANOCOBALAMIN 1000 UG/ML
1000 INJECTION, SOLUTION INTRAMUSCULAR; SUBCUTANEOUS
Status: DISCONTINUED | OUTPATIENT
Start: 2019-06-13 | End: 2019-06-13 | Stop reason: HOSPADM

## 2019-06-13 RX ORDER — CYANOCOBALAMIN 1000 UG/ML
1000 INJECTION, SOLUTION INTRAMUSCULAR; SUBCUTANEOUS
Status: CANCELLED
Start: 2019-07-01

## 2019-06-13 RX ADMIN — CYANOCOBALAMIN 1000 MCG: 1000 INJECTION, SOLUTION INTRAMUSCULAR at 02:06

## 2019-07-11 ENCOUNTER — INFUSION (OUTPATIENT)
Dept: INFUSION THERAPY | Facility: HOSPITAL | Age: 39
End: 2019-07-11
Attending: INTERNAL MEDICINE
Payer: COMMERCIAL

## 2019-07-11 DIAGNOSIS — D51.8 OTHER VITAMIN B12 DEFICIENCY ANEMIAS: Primary | ICD-10-CM

## 2019-07-11 PROCEDURE — 63600175 PHARM REV CODE 636 W HCPCS: Performed by: NURSE PRACTITIONER

## 2019-07-11 PROCEDURE — 96372 THER/PROPH/DIAG INJ SC/IM: CPT

## 2019-07-11 RX ORDER — CYANOCOBALAMIN 1000 UG/ML
1000 INJECTION, SOLUTION INTRAMUSCULAR; SUBCUTANEOUS
Status: DISCONTINUED | OUTPATIENT
Start: 2019-07-11 | End: 2019-07-11 | Stop reason: HOSPADM

## 2019-07-11 RX ORDER — HEPARIN 100 UNIT/ML
500 SYRINGE INTRAVENOUS
Status: CANCELLED | OUTPATIENT
Start: 2019-08-01

## 2019-07-11 RX ORDER — CYANOCOBALAMIN 1000 UG/ML
1000 INJECTION, SOLUTION INTRAMUSCULAR; SUBCUTANEOUS
Status: CANCELLED
Start: 2019-08-01

## 2019-07-11 RX ORDER — SODIUM CHLORIDE 0.9 % (FLUSH) 0.9 %
10 SYRINGE (ML) INJECTION
Status: CANCELLED | OUTPATIENT
Start: 2019-08-01

## 2019-07-11 RX ADMIN — CYANOCOBALAMIN 1000 MCG: 1000 INJECTION, SOLUTION INTRAMUSCULAR at 10:07

## 2019-07-11 NOTE — DISCHARGE INSTRUCTIONS
[facility] [place]  [address] [facility]  [address]  [phone] phone     [phone] fax  Hours of Operation: Monday- Friday 8:00am- 5:00pm  After hours phone  [phone]  Hematology / Oncology Physicians on call      Dr. Manny Ruvalcaba      Please call with any concerns regarding your appointment today.

## 2019-08-12 ENCOUNTER — INFUSION (OUTPATIENT)
Dept: INFUSION THERAPY | Facility: HOSPITAL | Age: 39
End: 2019-08-12
Attending: INTERNAL MEDICINE
Payer: COMMERCIAL

## 2019-08-12 VITALS
RESPIRATION RATE: 20 BRPM | HEART RATE: 75 BPM | TEMPERATURE: 98 F | SYSTOLIC BLOOD PRESSURE: 124 MMHG | DIASTOLIC BLOOD PRESSURE: 82 MMHG | OXYGEN SATURATION: 98 %

## 2019-08-12 DIAGNOSIS — D51.8 OTHER VITAMIN B12 DEFICIENCY ANEMIAS: Primary | ICD-10-CM

## 2019-08-12 PROCEDURE — 96372 THER/PROPH/DIAG INJ SC/IM: CPT

## 2019-08-12 PROCEDURE — 63600175 PHARM REV CODE 636 W HCPCS: Performed by: NURSE PRACTITIONER

## 2019-08-12 RX ORDER — CYANOCOBALAMIN 1000 UG/ML
1000 INJECTION, SOLUTION INTRAMUSCULAR; SUBCUTANEOUS
Status: DISCONTINUED | OUTPATIENT
Start: 2019-08-12 | End: 2019-08-12 | Stop reason: HOSPADM

## 2019-08-12 RX ORDER — CYANOCOBALAMIN 1000 UG/ML
1000 INJECTION, SOLUTION INTRAMUSCULAR; SUBCUTANEOUS
Status: CANCELLED
Start: 2019-09-01

## 2019-08-12 RX ORDER — SODIUM CHLORIDE 0.9 % (FLUSH) 0.9 %
10 SYRINGE (ML) INJECTION
Status: CANCELLED | OUTPATIENT
Start: 2019-09-01

## 2019-08-12 RX ORDER — HEPARIN 100 UNIT/ML
500 SYRINGE INTRAVENOUS
Status: CANCELLED | OUTPATIENT
Start: 2019-09-01

## 2019-08-12 RX ADMIN — CYANOCOBALAMIN 1000 MCG: 1000 INJECTION, SOLUTION INTRAMUSCULAR at 03:08

## 2019-08-26 ENCOUNTER — TELEPHONE (OUTPATIENT)
Dept: HEMATOLOGY/ONCOLOGY | Facility: CLINIC | Age: 39
End: 2019-08-26

## 2019-08-26 NOTE — TELEPHONE ENCOUNTER
Nurse returned call to pt she was transferred to [facility] dept to r/s infusion apt

## 2019-08-26 NOTE — TELEPHONE ENCOUNTER
----- Message from Aric Tran sent at 8/26/2019 10:29 AM CDT -----  Contact: Pt  Pt called in regards to reschedule appointment. Pt can be reached at [phone] (vviv) .

## 2019-09-12 ENCOUNTER — INFUSION (OUTPATIENT)
Dept: INFUSION THERAPY | Facility: HOSPITAL | Age: 39
End: 2019-09-12
Attending: INTERNAL MEDICINE
Payer: COMMERCIAL

## 2019-09-12 DIAGNOSIS — D51.8 OTHER VITAMIN B12 DEFICIENCY ANEMIAS: Primary | ICD-10-CM

## 2019-09-12 PROCEDURE — 63600175 PHARM REV CODE 636 W HCPCS: Performed by: NURSE PRACTITIONER

## 2019-09-12 PROCEDURE — 96372 THER/PROPH/DIAG INJ SC/IM: CPT

## 2019-09-12 RX ORDER — HEPARIN 100 UNIT/ML
500 SYRINGE INTRAVENOUS
Status: CANCELLED | OUTPATIENT
Start: 2019-10-01

## 2019-09-12 RX ORDER — CYANOCOBALAMIN 1000 UG/ML
1000 INJECTION, SOLUTION INTRAMUSCULAR; SUBCUTANEOUS
Status: CANCELLED
Start: 2019-10-01

## 2019-09-12 RX ORDER — SODIUM CHLORIDE 0.9 % (FLUSH) 0.9 %
10 SYRINGE (ML) INJECTION
Status: CANCELLED | OUTPATIENT
Start: 2019-10-01

## 2019-09-12 RX ORDER — CYANOCOBALAMIN 1000 UG/ML
1000 INJECTION, SOLUTION INTRAMUSCULAR; SUBCUTANEOUS
Status: DISCONTINUED | OUTPATIENT
Start: 2019-09-12 | End: 2019-09-12 | Stop reason: HOSPADM

## 2019-09-12 RX ADMIN — CYANOCOBALAMIN 1000 MCG: 1000 INJECTION, SOLUTION INTRAMUSCULAR at 03:09

## 2019-09-12 NOTE — NURSING
B12 injection given subcutaneous in left arm without difficulties.Bandaid applied. Patient instructed to stay in the clinic for 15 minutes. Patient verbalized understanding and will notify nurse with any complaints.

## 2019-10-03 ENCOUNTER — LAB VISIT (OUTPATIENT)
Dept: LAB | Facility: HOSPITAL | Age: 39
End: 2019-10-03
Attending: NURSE PRACTITIONER
Payer: COMMERCIAL

## 2019-10-03 ENCOUNTER — INFUSION (OUTPATIENT)
Dept: INFUSION THERAPY | Facility: HOSPITAL | Age: 39
End: 2019-10-03
Attending: INTERNAL MEDICINE
Payer: COMMERCIAL

## 2019-10-03 VITALS
RESPIRATION RATE: 18 BRPM | SYSTOLIC BLOOD PRESSURE: 112 MMHG | DIASTOLIC BLOOD PRESSURE: 79 MMHG | HEART RATE: 78 BPM | TEMPERATURE: 97 F | OXYGEN SATURATION: 98 %

## 2019-10-03 DIAGNOSIS — D51.8 OTHER VITAMIN B12 DEFICIENCY ANEMIAS: Primary | ICD-10-CM

## 2019-10-03 DIAGNOSIS — Z98.84 S/P BARIATRIC SURGERY: ICD-10-CM

## 2019-10-03 DIAGNOSIS — D50.8 OTHER IRON DEFICIENCY ANEMIAS: ICD-10-CM

## 2019-10-03 DIAGNOSIS — D51.8 OTHER VITAMIN B12 DEFICIENCY ANEMIAS: ICD-10-CM

## 2019-10-03 LAB
ALBUMIN SERPL BCP-MCNC: 3.9 G/DL (ref 3.5–5.2)
ALP SERPL-CCNC: 94 U/L (ref 55–135)
ALT SERPL W/O P-5'-P-CCNC: 61 U/L (ref 10–44)
ANION GAP SERPL CALC-SCNC: 10 MMOL/L (ref 8–16)
AST SERPL-CCNC: 35 U/L (ref 10–40)
BASOPHILS # BLD AUTO: 0.03 K/UL (ref 0–0.2)
BASOPHILS NFR BLD: 0.4 % (ref 0–1.9)
BILIRUB SERPL-MCNC: 0.2 MG/DL (ref 0.1–1)
BUN SERPL-MCNC: 9 MG/DL (ref 6–20)
CALCIUM SERPL-MCNC: 9.6 MG/DL (ref 8.7–10.5)
CHLORIDE SERPL-SCNC: 104 MMOL/L (ref 95–110)
CO2 SERPL-SCNC: 25 MMOL/L (ref 23–29)
CREAT SERPL-MCNC: 0.7 MG/DL (ref 0.5–1.4)
DIFFERENTIAL METHOD: ABNORMAL
EOSINOPHIL # BLD AUTO: 0.1 K/UL (ref 0–0.5)
EOSINOPHIL NFR BLD: 1.6 % (ref 0–8)
ERYTHROCYTE [DISTWIDTH] IN BLOOD BY AUTOMATED COUNT: 13.1 % (ref 11.5–14.5)
EST. GFR  (AFRICAN AMERICAN): >60 ML/MIN/1.73 M^2
EST. GFR  (NON AFRICAN AMERICAN): >60 ML/MIN/1.73 M^2
GLUCOSE SERPL-MCNC: 125 MG/DL (ref 70–110)
HCT VFR BLD AUTO: 40.2 % (ref 37–48.5)
HGB BLD-MCNC: 13.1 G/DL (ref 12–16)
IMM GRANULOCYTES # BLD AUTO: 0.03 K/UL (ref 0–0.04)
IMM GRANULOCYTES NFR BLD AUTO: 0.4 % (ref 0–0.5)
LYMPHOCYTES # BLD AUTO: 2.7 K/UL (ref 1–4.8)
LYMPHOCYTES NFR BLD: 40.5 % (ref 18–48)
MCH RBC QN AUTO: 29.7 PG (ref 27–31)
MCHC RBC AUTO-ENTMCNC: 32.6 G/DL (ref 32–36)
MCV RBC AUTO: 91 FL (ref 82–98)
MONOCYTES # BLD AUTO: 0.4 K/UL (ref 0.3–1)
MONOCYTES NFR BLD: 5.5 % (ref 4–15)
NEUTROPHILS # BLD AUTO: 3.5 K/UL (ref 1.8–7.7)
NEUTROPHILS NFR BLD: 51.6 % (ref 38–73)
NRBC BLD-RTO: 0 /100 WBC
PLATELET # BLD AUTO: 364 K/UL (ref 150–350)
PMV BLD AUTO: 9.7 FL (ref 9.2–12.9)
POTASSIUM SERPL-SCNC: 3.4 MMOL/L (ref 3.5–5.1)
PROT SERPL-MCNC: 7 G/DL (ref 6–8.4)
RBC # BLD AUTO: 4.41 M/UL (ref 4–5.4)
SODIUM SERPL-SCNC: 139 MMOL/L (ref 136–145)
WBC # BLD AUTO: 6.72 K/UL (ref 3.9–12.7)

## 2019-10-03 PROCEDURE — 82607 VITAMIN B-12: CPT

## 2019-10-03 PROCEDURE — 96372 THER/PROPH/DIAG INJ SC/IM: CPT

## 2019-10-03 PROCEDURE — 82746 ASSAY OF FOLIC ACID SERUM: CPT

## 2019-10-03 PROCEDURE — 82728 ASSAY OF FERRITIN: CPT

## 2019-10-03 PROCEDURE — 83540 ASSAY OF IRON: CPT

## 2019-10-03 PROCEDURE — 84597 ASSAY OF VITAMIN K: CPT

## 2019-10-03 PROCEDURE — 84207 ASSAY OF VITAMIN B-6: CPT

## 2019-10-03 PROCEDURE — 84590 ASSAY OF VITAMIN A: CPT

## 2019-10-03 PROCEDURE — 63600175 PHARM REV CODE 636 W HCPCS: Performed by: NURSE PRACTITIONER

## 2019-10-03 PROCEDURE — 84255 ASSAY OF SELENIUM: CPT

## 2019-10-03 PROCEDURE — 80053 COMPREHEN METABOLIC PANEL: CPT

## 2019-10-03 PROCEDURE — 82306 VITAMIN D 25 HYDROXY: CPT

## 2019-10-03 PROCEDURE — 85025 COMPLETE CBC W/AUTO DIFF WBC: CPT

## 2019-10-03 PROCEDURE — 84425 ASSAY OF VITAMIN B-1: CPT

## 2019-10-03 RX ORDER — CYANOCOBALAMIN 1000 UG/ML
1000 INJECTION, SOLUTION INTRAMUSCULAR; SUBCUTANEOUS
Status: DISCONTINUED | OUTPATIENT
Start: 2019-10-03 | End: 2019-10-03 | Stop reason: HOSPADM

## 2019-10-03 RX ORDER — SODIUM CHLORIDE 0.9 % (FLUSH) 0.9 %
10 SYRINGE (ML) INJECTION
Status: CANCELLED | OUTPATIENT
Start: 2019-11-01

## 2019-10-03 RX ORDER — HEPARIN 100 UNIT/ML
500 SYRINGE INTRAVENOUS
Status: CANCELLED | OUTPATIENT
Start: 2019-11-01

## 2019-10-03 RX ORDER — CYANOCOBALAMIN 1000 UG/ML
1000 INJECTION, SOLUTION INTRAMUSCULAR; SUBCUTANEOUS
Status: CANCELLED
Start: 2019-11-01

## 2019-10-03 RX ADMIN — CYANOCOBALAMIN 1000 MCG: 1000 INJECTION, SOLUTION INTRAMUSCULAR at 03:10

## 2019-10-04 LAB
25(OH)D3+25(OH)D2 SERPL-MCNC: 47 NG/ML (ref 30–96)
FERRITIN SERPL-MCNC: 19 NG/ML (ref 20–300)
FOLATE SERPL-MCNC: 17.7 NG/ML (ref 4–24)
IRON SERPL-MCNC: 39 UG/DL (ref 30–160)
SATURATED IRON: 8 % (ref 20–50)
TOTAL IRON BINDING CAPACITY: 505 UG/DL (ref 250–450)
TRANSFERRIN SERPL-MCNC: 341 MG/DL (ref 200–375)
VIT B12 SERPL-MCNC: 808 PG/ML (ref 210–950)

## 2019-10-07 LAB
PHYTONADIONE SERPL-MCNC: 1.47 NMOL/L (ref 0.22–4.88)
SELENIUM SERPL-MCNC: 138 UG/L (ref 23–190)

## 2019-10-07 RX ORDER — SODIUM CHLORIDE 0.9 % (FLUSH) 0.9 %
10 SYRINGE (ML) INJECTION
Status: CANCELLED | OUTPATIENT
Start: 2019-10-17

## 2019-10-07 RX ORDER — METHYLPREDNISOLONE SOD SUCC 125 MG
125 VIAL (EA) INJECTION
Status: CANCELLED
Start: 2019-10-17

## 2019-10-07 RX ORDER — METHYLPREDNISOLONE SOD SUCC 125 MG
125 VIAL (EA) INJECTION
Status: CANCELLED
Start: 2019-10-24

## 2019-10-07 RX ORDER — HEPARIN 100 UNIT/ML
500 SYRINGE INTRAVENOUS
Status: CANCELLED | OUTPATIENT
Start: 2019-10-24

## 2019-10-07 RX ORDER — SODIUM CHLORIDE 0.9 % (FLUSH) 0.9 %
10 SYRINGE (ML) INJECTION
Status: CANCELLED | OUTPATIENT
Start: 2019-10-24

## 2019-10-07 RX ORDER — HEPARIN 100 UNIT/ML
500 SYRINGE INTRAVENOUS
Status: CANCELLED | OUTPATIENT
Start: 2019-10-17

## 2019-10-08 LAB
PYRIDOXAL SERPL-MCNC: 17 UG/L (ref 5–50)
VIT A SERPL-MCNC: 64 UG/DL (ref 38–106)
VIT B1 BLD-MCNC: 86 UG/L (ref 38–122)

## 2019-10-10 ENCOUNTER — PATIENT MESSAGE (OUTPATIENT)
Dept: PODIATRY | Facility: CLINIC | Age: 39
End: 2019-10-10

## 2019-10-10 ENCOUNTER — TELEPHONE (OUTPATIENT)
Dept: HEMATOLOGY/ONCOLOGY | Facility: CLINIC | Age: 39
End: 2019-10-10

## 2019-10-16 NOTE — PROGRESS NOTES
Subjective:       Patient ID: Shelby Meeks is a 39 y.o. female.    Chief Complaint: iron deficiency anemia (f/u) and B12 Injection (f/u)    HPI: Follow-up for iron def anemia and b12 deficiency s/p bariatric surgery    Last b12 injection 10/3/19    Last visit with Dr. Evan Carroll on 5/28/18-  38- year-old  lady had a gastric band in the year 2001 for weight loss and it was reversed in 2006.  She ended up having a gastric bypass in 2015.     Patient had partial hyst with unil ooph 6/10/19     She had  been taking oral iron for several months prior to the initial visit with Dr. Carroll  without any significant improvement.  Low iron and b12 levels were noted and she was given IV iron and started on supplemental b12 injections    Last IV iron was injectafer 3/23/18 and 4/2/18    Pt has her gallblader removed in August 2018  Elevated liver enzymes noted since July 2018 on [facility] labs  Pt had CT of abd/pelvis with contrast in the past year that was wnl,     6/22/18 abdominal ultrasound revealed fatty liver    Negative hep b and c labs 11/2018 at [facility]    Pt had another ultrasound of her abd at [facility] April 2018 - fatty liver disease persists and is followed by Dr. Roberts. Dur for f/u this month- pt states will schedule    Review of Systems   Constitutional: Positive for fatigue. Negative for appetite change, fever and unexpected weight change.   HENT: Negative.    Eyes: Negative.  Negative for visual disturbance.   Respiratory: Negative.  Negative for cough and shortness of breath.    Cardiovascular: Negative.  Negative for chest pain.   Gastrointestinal: Negative.  Negative for abdominal pain, anal bleeding, blood in stool, constipation and diarrhea.   Endocrine: Negative.    Genitourinary: Negative.  Negative for frequency.   Musculoskeletal: Negative.  Negative for back pain.   Skin: Negative.  Negative for rash.   Allergic/Immunologic: Negative.    Neurological: Negative.    Hematological: Negative.   Negative for adenopathy.   Psychiatric/Behavioral: Negative.  The patient is not nervous/anxious.    Pt admits to drinking only diet soda- no water during the day    Objective:      Physical Exam   Constitutional: She is oriented to person, place, and time. She appears well-developed and well-nourished.   HENT:   Head: Normocephalic and atraumatic.   Right Ear: External ear normal.   Left Ear: External ear normal.   Mouth/Throat: No oropharyngeal exudate.   Eyes: Pupils are equal, round, and reactive to light. Conjunctivae and EOM are normal. Right eye exhibits no discharge. Left eye exhibits no discharge. No scleral icterus.   Neck: Normal range of motion. Neck supple. No thyromegaly present.   Cardiovascular: Normal rate, regular rhythm and normal heart sounds.   Pulmonary/Chest: Effort normal and breath sounds normal. Right breast exhibits no inverted nipple, no mass, no nipple discharge, no skin change and no tenderness. Left breast exhibits no inverted nipple, no mass, no nipple discharge, no skin change and no tenderness.   Abdominal: Soft. Bowel sounds are normal.   Musculoskeletal: Normal range of motion. She exhibits no edema.        Right shoulder: She exhibits no crepitus and normal strength.   Lymphadenopathy:        Head (right side): No submental, no submandibular, no tonsillar, no preauricular, no posterior auricular and no occipital adenopathy present.        Head (left side): No submental, no submandibular, no tonsillar, no preauricular, no posterior auricular and no occipital adenopathy present.     She has no cervical adenopathy.        Right cervical: No superficial cervical and no posterior cervical adenopathy present.       Left cervical: No superficial cervical and no posterior cervical adenopathy present.     She has no axillary adenopathy.        Right: No supraclavicular adenopathy present.        Left: No supraclavicular adenopathy present.   Neurological: She is alert and oriented to  person, place, and time. She has normal reflexes.   Skin: Skin is warm and dry. No rash noted. No erythema. No pallor.   Psychiatric: She has a normal mood and affect. Her behavior is normal. Judgment and thought content normal.         LABS:  Results for CHANTEL APPIAH (MRN [id number]) as of 10/16/2019 14:08   Ref. Range 3/14/2019 14:58 6/5/2019 11:50 10/3/2019 15:06   WBC Latest Ref Range: 3.90 - 12.70 K/uL 6.83 7.34 6.72   RBC Latest Ref Range: 4.00 - 5.40 M/uL 4.14 4.31 4.41   Hemoglobin Latest Ref Range: 12.0 - 16.0 g/dL 12.4 12.9 13.1   Hematocrit Latest Ref Range: 37.0 - 48.5 % 39.3 40.6 40.2   MCV Latest Ref Range: 82 - 98 fL 95 94 91   MCH Latest Ref Range: 27.0 - 31.0 pg 30.0 29.9 29.7   MCHC Latest Ref Range: 32.0 - 36.0 g/dL 31.6 (L) 31.8 (L) 32.6   RDW Latest Ref Range: 11.5 - 14.5 % 13.1 12.6 13.1   Platelets Latest Ref Range: 150 - 350 K/uL 355 (H) 398 (H) 364 (H)   MPV Latest Ref Range: 9.2 - 12.9 fL 9.4 9.8 9.7   Gran% Latest Ref Range: 38.0 - 73.0 % 52.1 53.6 51.6   Gran # (ANC) Latest Ref Range: 1.8 - 7.7 K/uL 3.6 3.9 3.5   Lymph% Latest Ref Range: 18.0 - 48.0 % 37.6 36.8 40.5   Lymph # Latest Ref Range: 1.0 - 4.8 K/uL 2.6 2.7 2.7   Mono% Latest Ref Range: 4.0 - 15.0 % 8.1 6.9 5.5   Mono # Latest Ref Range: 0.3 - 1.0 K/uL 0.6 0.5 0.4   Eosinophil% Latest Ref Range: 0.0 - 8.0 % 1.8 2.3 1.6   Eos # Latest Ref Range: 0.0 - 0.5 K/uL 0.1 0.2 0.1   Basophil% Latest Ref Range: 0.0 - 1.9 % 0.4 0.4 0.4   Baso # Latest Ref Range: 0.00 - 0.20 K/uL 0.03 0.03 0.03   nRBC Latest Ref Range: 0 /100 WBC   0   Differential Method Unknown Automated Automated Automated   Immature Grans (Abs) Latest Ref Range: 0.00 - 0.04 K/uL   0.03   Immature Granulocytes Latest Ref Range: 0.0 - 0.5 %   0.4   Iron Latest Ref Range: 30 - 160 ug/dL 40 78 39   TIBC Latest Ref Range: 250 - 450 ug/dL 401 465 (H) 505 (H)   Saturated Iron Latest Ref Range: 20 - 50 % 10 (L) 17 (L) 8 (L)   Transferrin Latest Ref Range: 200 - 375 mg/dL  271 314 341   Ferritin Latest Ref Range: 20.0 - 300.0 ng/mL 59 27 19 (L)   Folate Latest Ref Range: 4.0 - 24.0 ng/mL   17.7   Vitamin B-12 Latest Ref Range: 210 - 950 pg/mL 593 162 468           Assessment:       1. Other iron deficiency anemias    2. Other vitamin B12 deficiency anemias    3. S/P bariatric surgery    4. Fatigue associated with anemia    5. Fatty liver disease, nonalcoholic    6. Elevated LFTs        Plan:     1.      Hgb normal, plts elevated but stable- workup of elevated platelets negative for BRYANNA mutation, ferritin and iron sats low- serum iron dropping- needs IV injectafer X 2 doses  2.      B12 level is normal - Continue monthly b12 and rtc in 2 mons after last iron treatment with cbc, cmp, b12, iron, ferritin and tibc, will do further labs in 6 mons- B1, b6, vit d, selinium, vit A and vit K one week prior to visit with me for review- taking multivitamin bid  3.       Encouraged eating iron rich foods- call for any weakness or fatigue out of the ordinary.   4.       Elevated liver enzymes and fatty liver disease followed by Dr. Roberts - noted since July 2018 at [facility] lab-    Hep B and C labs at [facility] -11/2018 - negative   CT of abd and pelvis with contrast wnl 7/26/18   Hida scan normal 7/9/18   Ultrasound of gallbladder revealed fatty liver 6/22/18  Pt to schedule f/u for this month with Dr. Roberts  Recommended sublinguinal b12/b vitamins for pt to take daily to see if can wean off b12 injections- pt agrees. Will call if has any changes in stamina. Continue taking multivitamin.

## 2019-10-17 ENCOUNTER — OFFICE VISIT (OUTPATIENT)
Dept: HEMATOLOGY/ONCOLOGY | Facility: CLINIC | Age: 39
End: 2019-10-17
Payer: COMMERCIAL

## 2019-10-17 VITALS
DIASTOLIC BLOOD PRESSURE: 76 MMHG | TEMPERATURE: 98 F | HEART RATE: 76 BPM | BODY MASS INDEX: 38.48 KG/M2 | SYSTOLIC BLOOD PRESSURE: 112 MMHG | WEIGHT: 231.25 LBS

## 2019-10-17 DIAGNOSIS — D64.9 FATIGUE ASSOCIATED WITH ANEMIA: ICD-10-CM

## 2019-10-17 DIAGNOSIS — K76.0 FATTY LIVER DISEASE, NONALCOHOLIC: ICD-10-CM

## 2019-10-17 DIAGNOSIS — R79.89 ELEVATED LFTS: ICD-10-CM

## 2019-10-17 DIAGNOSIS — Z98.84 S/P BARIATRIC SURGERY: ICD-10-CM

## 2019-10-17 DIAGNOSIS — D50.8 OTHER IRON DEFICIENCY ANEMIAS: Primary | ICD-10-CM

## 2019-10-17 DIAGNOSIS — D51.8 OTHER VITAMIN B12 DEFICIENCY ANEMIAS: ICD-10-CM

## 2019-10-17 PROCEDURE — 99999 PR PBB SHADOW E&M-EST. PATIENT-LVL III: CPT | Mod: PBBFAC,,, | Performed by: NURSE PRACTITIONER

## 2019-10-17 PROCEDURE — 99999 PR PBB SHADOW E&M-EST. PATIENT-LVL III: ICD-10-PCS | Mod: PBBFAC,,, | Performed by: NURSE PRACTITIONER

## 2019-10-17 PROCEDURE — 99214 PR OFFICE/OUTPT VISIT, EST, LEVL IV, 30-39 MIN: ICD-10-PCS | Mod: S$GLB,,, | Performed by: NURSE PRACTITIONER

## 2019-10-17 PROCEDURE — 3008F PR BODY MASS INDEX (BMI) DOCUMENTED: ICD-10-PCS | Mod: CPTII,S$GLB,, | Performed by: NURSE PRACTITIONER

## 2019-10-17 PROCEDURE — 99214 OFFICE O/P EST MOD 30 MIN: CPT | Mod: S$GLB,,, | Performed by: NURSE PRACTITIONER

## 2019-10-17 PROCEDURE — 3008F BODY MASS INDEX DOCD: CPT | Mod: CPTII,S$GLB,, | Performed by: NURSE PRACTITIONER

## 2019-10-21 ENCOUNTER — INFUSION (OUTPATIENT)
Dept: INFUSION THERAPY | Facility: HOSPITAL | Age: 39
End: 2019-10-21
Attending: INTERNAL MEDICINE
Payer: COMMERCIAL

## 2019-10-21 VITALS
DIASTOLIC BLOOD PRESSURE: 81 MMHG | TEMPERATURE: 98 F | SYSTOLIC BLOOD PRESSURE: 119 MMHG | OXYGEN SATURATION: 98 % | RESPIRATION RATE: 18 BRPM | HEART RATE: 65 BPM

## 2019-10-21 DIAGNOSIS — D50.8 OTHER IRON DEFICIENCY ANEMIAS: Primary | ICD-10-CM

## 2019-10-21 PROCEDURE — 63600175 PHARM REV CODE 636 W HCPCS: Performed by: NURSE PRACTITIONER

## 2019-10-21 PROCEDURE — 96375 TX/PRO/DX INJ NEW DRUG ADDON: CPT

## 2019-10-21 PROCEDURE — 96365 THER/PROPH/DIAG IV INF INIT: CPT

## 2019-10-21 RX ORDER — METHYLPREDNISOLONE SOD SUCC 125 MG
125 VIAL (EA) INJECTION
Status: COMPLETED | OUTPATIENT
Start: 2019-10-21 | End: 2019-10-21

## 2019-10-21 RX ADMIN — FERRIC CARBOXYMALTOSE INJECTION 750 MG: 50 INJECTION, SOLUTION INTRAVENOUS at 03:10

## 2019-10-21 RX ADMIN — METHYLPREDNISOLONE SODIUM SUCCINATE 125 MG: 125 INJECTION, POWDER, FOR SOLUTION INTRAMUSCULAR; INTRAVENOUS at 02:10

## 2019-10-21 NOTE — DISCHARGE INSTRUCTIONS
[facility]  [address] [address] [address]  [address]  [phone] phone     [phone] fax  Hours of Operation: Monday- Friday 8:00am- 5:00pm  After hours phone  [phone]  Hematology / Oncology Physicians on call      TRISHA Maynard Dr., Dr., Dr., Dr., NP Sydney Prescott, NP Tyesha Taylor, NP    Please call with any concerns regarding your appointment today.

## 2019-10-28 ENCOUNTER — INFUSION (OUTPATIENT)
Dept: INFUSION THERAPY | Facility: HOSPITAL | Age: 39
End: 2019-10-28
Attending: INTERNAL MEDICINE
Payer: COMMERCIAL

## 2019-10-28 VITALS
DIASTOLIC BLOOD PRESSURE: 68 MMHG | TEMPERATURE: 98 F | SYSTOLIC BLOOD PRESSURE: 109 MMHG | BODY MASS INDEX: 38.53 KG/M2 | HEIGHT: 65 IN | WEIGHT: 231.25 LBS | RESPIRATION RATE: 16 BRPM | HEART RATE: 78 BPM

## 2019-10-28 DIAGNOSIS — D50.8 OTHER IRON DEFICIENCY ANEMIAS: ICD-10-CM

## 2019-10-28 DIAGNOSIS — D51.8 OTHER VITAMIN B12 DEFICIENCY ANEMIAS: Primary | ICD-10-CM

## 2019-10-28 PROCEDURE — 63600175 PHARM REV CODE 636 W HCPCS: Performed by: NURSE PRACTITIONER

## 2019-10-28 PROCEDURE — 96374 THER/PROPH/DIAG INJ IV PUSH: CPT

## 2019-10-28 PROCEDURE — 96372 THER/PROPH/DIAG INJ SC/IM: CPT

## 2019-10-28 RX ORDER — CYANOCOBALAMIN 1000 UG/ML
1000 INJECTION, SOLUTION INTRAMUSCULAR; SUBCUTANEOUS
Status: DISCONTINUED | OUTPATIENT
Start: 2019-10-28 | End: 2019-10-28 | Stop reason: HOSPADM

## 2019-10-28 RX ORDER — SODIUM CHLORIDE 0.9 % (FLUSH) 0.9 %
10 SYRINGE (ML) INJECTION
Status: CANCELLED | OUTPATIENT
Start: 2019-11-01

## 2019-10-28 RX ORDER — CYANOCOBALAMIN 1000 UG/ML
1000 INJECTION, SOLUTION INTRAMUSCULAR; SUBCUTANEOUS
Status: CANCELLED
Start: 2019-11-01

## 2019-10-28 RX ORDER — HEPARIN 100 UNIT/ML
500 SYRINGE INTRAVENOUS
Status: CANCELLED | OUTPATIENT
Start: 2019-11-01

## 2019-10-28 RX ADMIN — CYANOCOBALAMIN 1000 MCG: 1000 INJECTION, SOLUTION INTRAMUSCULAR at 03:10

## 2019-10-28 RX ADMIN — FERRIC CARBOXYMALTOSE INJECTION 750 MG: 50 INJECTION, SOLUTION INTRAVENOUS at 03:10

## 2019-10-28 RX ADMIN — SODIUM CHLORIDE: 9 INJECTION, SOLUTION INTRAVENOUS at 03:10

## 2019-10-28 NOTE — DISCHARGE INSTRUCTIONS
[facility]  [address]  [address] [address]  [phone] phone     [phone] fax  Hours of Operation: Monday- Friday 8:00am- 5:00pm  After hours phone  [phone]  Hematology / Oncology Physicians on call      Dr. Manny Fowler                        Please call with any concerns regarding your appointment today.      ANEMIA, Iron Deficiency [Adult]  Anemia is a condition where the size or number of red blood cells in the body is reduced. Iron is needed in the diet to make red cells. The red blood cells carry oxygen to all parts of the body. Anemia limits the delivery of oxygen to where it is needed. This causes a feeling of being tired and run down. When anemia becomes severe, the skin becomes pale and there is shortness of breath with exertion, headaches, dizziness, drowsiness and fatigue.  The cause of your anemia is lack of iron in your body. This may occur due to blood loss (for example, heavy menstrual periods or bleeding from the stomach or intestines) or a poor diet (not eating enough iron-containing foods), inability to absorb iron from your diet, or pregnancy.  If the blood count is low enough, an IRON SUPPLEMENT will be prescribed. It usually takes about 2-3 months of treatment with iron supplements to correct an anemia. Severe cases of anemia requires a blood transfusion to rapidly correct symptoms and deliver more oxygen to the cells.  HOME CARE:  1) Increase the iron stores in your body by eating foods high in iron content. This is a natural way of building your blood cells back up again. Beef, liver, spinach and other dark green leafy vegetables, whole grain products, beans and nuts are all natural sources of iron.  2) If you are having symptoms of anemia listed above:  -- Do not overexert yourself.  -- Talk to your doctor before flying on an airplane or travelling to high altitudes.  FOLLOW UP with your doctor  in 2 months for a repeat red blood cell count, or as recommended by our staff, to be sure that the anemia has been corrected.  GET PROMPT MEDICAL ATTENTION if any of the following occur or worsen:  -- Shortness of breath or chest pain  -- Dizziness or fainting  -- Vomiting blood or passing red or black-colored stool  © 7575-1090 [employer] [facility], [address] [place] [address], [place] [address]. All rights reserved. This information is not intended as a substitute for professional medical care. Always follow your healthcare professional's instructions.

## 2019-10-28 NOTE — NURSING
1530  10/28/19  Spoke with MARTHA Steel NP as pt is requesting to skip the steroid pre-med.  She states it made her edgy and unable to rest for days after receiving it last week.  In going over her allergies, she states that they are mostly sensitivities as opposed to true allergies, and this was reported to TRISHA Gil, who advises me that we can forego the steroid if pt understands that if she feels anything unusual or has any reaction at all, that we will have to give her the steroid.  Pt verbalized understanding and is agreeable.

## 2019-11-26 ENCOUNTER — PATIENT MESSAGE (OUTPATIENT)
Dept: HEMATOLOGY/ONCOLOGY | Facility: CLINIC | Age: 39
End: 2019-11-26

## 2019-12-03 ENCOUNTER — INFUSION (OUTPATIENT)
Dept: INFUSION THERAPY | Facility: HOSPITAL | Age: 39
End: 2019-12-03
Attending: INTERNAL MEDICINE
Payer: COMMERCIAL

## 2019-12-03 DIAGNOSIS — D51.8 OTHER VITAMIN B12 DEFICIENCY ANEMIAS: Primary | ICD-10-CM

## 2019-12-03 PROCEDURE — 96372 THER/PROPH/DIAG INJ SC/IM: CPT

## 2019-12-03 PROCEDURE — 63600175 PHARM REV CODE 636 W HCPCS: Performed by: NURSE PRACTITIONER

## 2019-12-03 RX ORDER — CYANOCOBALAMIN 1000 UG/ML
1000 INJECTION, SOLUTION INTRAMUSCULAR; SUBCUTANEOUS
Status: CANCELLED
Start: 2020-01-01

## 2019-12-03 RX ORDER — CYANOCOBALAMIN 1000 UG/ML
1000 INJECTION, SOLUTION INTRAMUSCULAR; SUBCUTANEOUS
Status: DISCONTINUED | OUTPATIENT
Start: 2019-12-03 | End: 2019-12-03 | Stop reason: HOSPADM

## 2019-12-03 RX ORDER — HEPARIN 100 UNIT/ML
500 SYRINGE INTRAVENOUS
Status: CANCELLED | OUTPATIENT
Start: 2020-01-01

## 2019-12-03 RX ORDER — BUSPIRONE HYDROCHLORIDE 7.5 MG/1
7.5 TABLET ORAL 2 TIMES DAILY
COMMUNITY
Start: 2019-10-31 | End: 2021-02-17

## 2019-12-03 RX ORDER — SODIUM CHLORIDE 0.9 % (FLUSH) 0.9 %
10 SYRINGE (ML) INJECTION
Status: CANCELLED | OUTPATIENT
Start: 2020-01-01

## 2019-12-03 RX ADMIN — CYANOCOBALAMIN 1000 MCG: 1000 INJECTION, SOLUTION INTRAMUSCULAR at 03:12

## 2019-12-03 NOTE — NURSING
B12 injection given without difficulties.Bandaid applied. Patient instructed to stay in the clinic for 15 minutes. Patient verbalized understanding and will notify nurse with any complaints.

## 2019-12-19 NOTE — PROGRESS NOTES
Subjective:       Patient ID: Shelby Meeks is a 39 y.o. female.    Chief Complaint: Iron deficiency anemia    HPI: Follow-up for iron def anemia and b12 deficiency s/p bariatric surgery    Last b12 injection 10/3/19 and 12/3/19- using sublinguinal B12 intermittently    Last visit with Dr. Evan Carroll on 5/28/18-  38- year-old  lady had a gastric band in the year 2001 for weight loss and it was reversed in 2006.  She ended up having a gastric bypass in 2015.     Patient had partial hyst with unil ooph 6/10/19     She had  been taking oral iron for several months prior to the initial visit with Dr. Carroll  without any significant improvement.  Low iron and b12 levels were noted and she was given IV iron and started on supplemental b12 injections    Last IV iron was injectafer 3/23/18 and 4/2/18 and 10/21/19 and 10/28/19    Pt has her gallblader removed in August 2018  Elevated liver enzymes noted since July 2018 on [facility] labs  Pt had CT of abd/pelvis with contrast last year that was wnl per pt report     6/22/18 abdominal ultrasound revealed fatty liver    Negative hep b and c labs 11/2018 [facility]    Pt had another ultrasound of her abd at [facility] April 2018 - fatty liver disease persists and is followed by Dr. Roberts. Had fibroscan last week.     Review of Systems   Constitutional: Positive for fatigue. Negative for appetite change, fever and unexpected weight change.   HENT: Negative.    Eyes: Negative.  Negative for visual disturbance.   Respiratory: Negative.  Negative for cough and shortness of breath.    Cardiovascular: Negative.  Negative for chest pain.   Gastrointestinal: Negative.  Negative for abdominal pain, anal bleeding, blood in stool, constipation and diarrhea.   Endocrine: Negative.    Genitourinary: Negative.  Negative for frequency.   Musculoskeletal: Negative.  Negative for back pain.   Skin: Negative.  Negative for rash.   Allergic/Immunologic: Negative.    Neurological: Negative.     Hematological: Negative.  Negative for adenopathy.   Psychiatric/Behavioral: Negative.  The patient is not nervous/anxious.    Pt admits to drinking only diet soda- no water during the day    Objective:      Physical Exam   Constitutional: She is oriented to person, place, and time. She appears well-developed and well-nourished.   HENT:   Head: Normocephalic and atraumatic.   Right Ear: External ear normal.   Left Ear: External ear normal.   Mouth/Throat: No oropharyngeal exudate.   Eyes: Pupils are equal, round, and reactive to light. Conjunctivae and EOM are normal. Right eye exhibits no discharge. Left eye exhibits no discharge. No scleral icterus.   Neck: Normal range of motion. Neck supple. No thyromegaly present.   Cardiovascular: Normal rate, regular rhythm and normal heart sounds.   Pulmonary/Chest: Effort normal and breath sounds normal. Right breast exhibits no inverted nipple, no mass, no nipple discharge, no skin change and no tenderness. Left breast exhibits no inverted nipple, no mass, no nipple discharge, no skin change and no tenderness.   Abdominal: Soft. Bowel sounds are normal.   Musculoskeletal: Normal range of motion. She exhibits no edema.        Right shoulder: She exhibits no crepitus and normal strength.   Lymphadenopathy:        Head (right side): No submental, no submandibular, no tonsillar, no preauricular, no posterior auricular and no occipital adenopathy present.        Head (left side): No submental, no submandibular, no tonsillar, no preauricular, no posterior auricular and no occipital adenopathy present.     She has no cervical adenopathy.        Right cervical: No superficial cervical and no posterior cervical adenopathy present.       Left cervical: No superficial cervical and no posterior cervical adenopathy present.     She has no axillary adenopathy.        Right: No supraclavicular adenopathy present.        Left: No supraclavicular adenopathy present.   Neurological: She  is alert and oriented to person, place, and time. She has normal reflexes.   Skin: Skin is warm and dry. No rash noted. No erythema. No pallor.   Psychiatric: She has a normal mood and affect. Her behavior is normal. Judgment and thought content normal.         LABS:  Results for CHANTEL APPIAH (MRN [id number]) as of 1/2/2020 10:27   Ref. Range 10/3/2019 15:06 12/27/2019 11:17   WBC Latest Ref Range: 3.90 - 12.70 K/uL 6.72 10.39   RBC Latest Ref Range: 4.00 - 5.40 M/uL 4.41 4.42   Hemoglobin Latest Ref Range: 12.0 - 16.0 g/dL 13.1 13.6   Hematocrit Latest Ref Range: 37.0 - 48.5 % 40.2 43.5   MCV Latest Ref Range: 82 - 98 fL 91 98   MCH Latest Ref Range: 27.0 - 31.0 pg 29.7 30.8   MCHC Latest Ref Range: 32.0 - 36.0 g/dL 32.6 31.3 (L)   RDW Latest Ref Range: 11.5 - 14.5 % 13.1 13.6   Platelets Latest Ref Range: 150 - 350 K/uL 364 (H) 402 (H)   MPV Latest Ref Range: 9.2 - 12.9 fL 9.7 10.1   Gran% Latest Ref Range: 38.0 - 73.0 % 51.6 81.7 (H)   Gran # (ANC) Latest Ref Range: 1.8 - 7.7 K/uL 3.5 8.5 (H)   Lymph% Latest Ref Range: 18.0 - 48.0 % 40.5 14.2 (L)   Lymph # Latest Ref Range: 1.0 - 4.8 K/uL 2.7 1.5   Mono% Latest Ref Range: 4.0 - 15.0 % 5.5 3.1 (L)   Mono # Latest Ref Range: 0.3 - 1.0 K/uL 0.4 0.3   Eosinophil% Latest Ref Range: 0.0 - 8.0 % 1.6 0.1   Eos # Latest Ref Range: 0.0 - 0.5 K/uL 0.1 0.0   Basophil% Latest Ref Range: 0.0 - 1.9 % 0.4 0.2   Baso # Latest Ref Range: 0.00 - 0.20 K/uL 0.03 0.02   nRBC Latest Ref Range: 0 /100 WBC 0 0   Differential Method Unknown Automated Automated   Immature Grans (Abs) Latest Ref Range: 0.00 - 0.04 K/uL 0.03 0.07 (H)   Immature Granulocytes Latest Ref Range: 0.0 - 0.5 % 0.4 0.7 (H)   Iron Latest Ref Range: 30 - 160 ug/dL 39 173 (H)   TIBC Latest Ref Range: 250 - 450 ug/dL 505 (H) 403   Saturated Iron Latest Ref Range: 20 - 50 % 8 (L) 43   Transferrin Latest Ref Range: 200 - 375 mg/dL 341 272   Ferritin Latest Ref Range: 20.0 - 300.0 ng/mL 19 (L) 326 (H)   Folate Latest  Ref Range: 4.0 - 24.0 ng/mL 17.7    Vitamin B-12 Latest Ref Range: 210 - 950 pg/mL 808              Assessment:       1. S/P bariatric surgery    2. Other vitamin B12 deficiency anemias    3. Other iron deficiency anemias        Plan:     1.      Last IV iron 10/21/19 and 10/28/19  2.      Hgb 13.6, plts elevated but stable- workup of elevated platelets negative for BRYANNA mutation,   2.      B12 level is normal - Continue sublinguinal B12- rtc in 3 mons with cbc, cmp, b12, iron, ferritin and tibc, will do further labs in 6 mons- B1, b6, vit d, selinium, vit A and vit K one week prior to visit with me for review- taking multivitamin bid  3.       Encouraged eating iron rich foods- call for any weakness or fatigue out of the ordinary.   4.       Elevated liver enzymes and fatty liver disease followed by Dr. Roberts - noted since July 2018 at [facility] lab- recent ast and alt labs increasing- copy given to pt to take to Dr. Roberts since has f/u scheduled in near future- denies any change in meds- no alcohol use- did receive treatment for upper respiratory infection one week ago   Hep B and C labs at [facility] -11/2018 - negative   CT of abd and pelvis with contrast wnl 7/26/18   Hida scan normal 7/9/18   Ultrasound of gallbladder revealed fatty liver 6/22/18  Recommended sublinguinal b12/b vitamins for pt to take daily to see if can wean off b12 injections- pt agrees. Will call if has any changes in stamina. Continue taking multivitamin.

## 2019-12-27 ENCOUNTER — LAB VISIT (OUTPATIENT)
Dept: LAB | Facility: HOSPITAL | Age: 39
End: 2019-12-27
Attending: NURSE PRACTITIONER
Payer: COMMERCIAL

## 2019-12-27 DIAGNOSIS — Z98.84 S/P BARIATRIC SURGERY: ICD-10-CM

## 2019-12-27 DIAGNOSIS — D50.8 OTHER IRON DEFICIENCY ANEMIAS: ICD-10-CM

## 2019-12-27 DIAGNOSIS — D51.8 OTHER VITAMIN B12 DEFICIENCY ANEMIAS: ICD-10-CM

## 2019-12-27 LAB
ALBUMIN SERPL BCP-MCNC: 3.8 G/DL (ref 3.5–5.2)
ALP SERPL-CCNC: 92 U/L (ref 55–135)
ALT SERPL W/O P-5'-P-CCNC: 84 U/L (ref 10–44)
ANION GAP SERPL CALC-SCNC: 11 MMOL/L (ref 8–16)
AST SERPL-CCNC: 47 U/L (ref 10–40)
BASOPHILS # BLD AUTO: 0.02 K/UL (ref 0–0.2)
BASOPHILS NFR BLD: 0.2 % (ref 0–1.9)
BILIRUB SERPL-MCNC: 0.3 MG/DL (ref 0.1–1)
BUN SERPL-MCNC: 12 MG/DL (ref 6–20)
CALCIUM SERPL-MCNC: 9.5 MG/DL (ref 8.7–10.5)
CHLORIDE SERPL-SCNC: 105 MMOL/L (ref 95–110)
CO2 SERPL-SCNC: 23 MMOL/L (ref 23–29)
CREAT SERPL-MCNC: 0.7 MG/DL (ref 0.5–1.4)
DIFFERENTIAL METHOD: ABNORMAL
EOSINOPHIL # BLD AUTO: 0 K/UL (ref 0–0.5)
EOSINOPHIL NFR BLD: 0.1 % (ref 0–8)
ERYTHROCYTE [DISTWIDTH] IN BLOOD BY AUTOMATED COUNT: 13.6 % (ref 11.5–14.5)
EST. GFR  (AFRICAN AMERICAN): >60 ML/MIN/1.73 M^2
EST. GFR  (NON AFRICAN AMERICAN): >60 ML/MIN/1.73 M^2
FERRITIN SERPL-MCNC: 326 NG/ML (ref 20–300)
GLUCOSE SERPL-MCNC: 160 MG/DL (ref 70–110)
HCT VFR BLD AUTO: 43.5 % (ref 37–48.5)
HGB BLD-MCNC: 13.6 G/DL (ref 12–16)
IMM GRANULOCYTES # BLD AUTO: 0.07 K/UL (ref 0–0.04)
IMM GRANULOCYTES NFR BLD AUTO: 0.7 % (ref 0–0.5)
IRON SERPL-MCNC: 173 UG/DL (ref 30–160)
LYMPHOCYTES # BLD AUTO: 1.5 K/UL (ref 1–4.8)
LYMPHOCYTES NFR BLD: 14.2 % (ref 18–48)
MCH RBC QN AUTO: 30.8 PG (ref 27–31)
MCHC RBC AUTO-ENTMCNC: 31.3 G/DL (ref 32–36)
MCV RBC AUTO: 98 FL (ref 82–98)
MONOCYTES # BLD AUTO: 0.3 K/UL (ref 0.3–1)
MONOCYTES NFR BLD: 3.1 % (ref 4–15)
NEUTROPHILS # BLD AUTO: 8.5 K/UL (ref 1.8–7.7)
NEUTROPHILS NFR BLD: 81.7 % (ref 38–73)
NRBC BLD-RTO: 0 /100 WBC
PLATELET # BLD AUTO: 402 K/UL (ref 150–350)
PMV BLD AUTO: 10.1 FL (ref 9.2–12.9)
POTASSIUM SERPL-SCNC: 4 MMOL/L (ref 3.5–5.1)
PROT SERPL-MCNC: 7.3 G/DL (ref 6–8.4)
RBC # BLD AUTO: 4.42 M/UL (ref 4–5.4)
SATURATED IRON: 43 % (ref 20–50)
SODIUM SERPL-SCNC: 139 MMOL/L (ref 136–145)
TOTAL IRON BINDING CAPACITY: 403 UG/DL (ref 250–450)
TRANSFERRIN SERPL-MCNC: 272 MG/DL (ref 200–375)
WBC # BLD AUTO: 10.39 K/UL (ref 3.9–12.7)

## 2019-12-27 PROCEDURE — 85025 COMPLETE CBC W/AUTO DIFF WBC: CPT

## 2019-12-27 PROCEDURE — 80053 COMPREHEN METABOLIC PANEL: CPT

## 2019-12-27 PROCEDURE — 36415 COLL VENOUS BLD VENIPUNCTURE: CPT

## 2019-12-27 PROCEDURE — 82728 ASSAY OF FERRITIN: CPT

## 2019-12-27 PROCEDURE — 83540 ASSAY OF IRON: CPT

## 2020-01-02 ENCOUNTER — OFFICE VISIT (OUTPATIENT)
Dept: HEMATOLOGY/ONCOLOGY | Facility: CLINIC | Age: 40
End: 2020-01-02
Payer: COMMERCIAL

## 2020-01-02 VITALS
WEIGHT: 225.75 LBS | BODY MASS INDEX: 37.57 KG/M2 | SYSTOLIC BLOOD PRESSURE: 106 MMHG | HEART RATE: 79 BPM | DIASTOLIC BLOOD PRESSURE: 73 MMHG | TEMPERATURE: 99 F

## 2020-01-02 DIAGNOSIS — D51.8 OTHER VITAMIN B12 DEFICIENCY ANEMIAS: ICD-10-CM

## 2020-01-02 DIAGNOSIS — Z98.84 S/P BARIATRIC SURGERY: Primary | ICD-10-CM

## 2020-01-02 DIAGNOSIS — D50.8 OTHER IRON DEFICIENCY ANEMIAS: ICD-10-CM

## 2020-01-02 PROCEDURE — 3008F PR BODY MASS INDEX (BMI) DOCUMENTED: ICD-10-PCS | Mod: CPTII,S$GLB,, | Performed by: NURSE PRACTITIONER

## 2020-01-02 PROCEDURE — 99999 PR PBB SHADOW E&M-EST. PATIENT-LVL IV: ICD-10-PCS | Mod: PBBFAC,,, | Performed by: NURSE PRACTITIONER

## 2020-01-02 PROCEDURE — 3008F BODY MASS INDEX DOCD: CPT | Mod: CPTII,S$GLB,, | Performed by: NURSE PRACTITIONER

## 2020-01-02 PROCEDURE — 99214 PR OFFICE/OUTPT VISIT, EST, LEVL IV, 30-39 MIN: ICD-10-PCS | Mod: S$GLB,,, | Performed by: NURSE PRACTITIONER

## 2020-01-02 PROCEDURE — 99999 PR PBB SHADOW E&M-EST. PATIENT-LVL IV: CPT | Mod: PBBFAC,,, | Performed by: NURSE PRACTITIONER

## 2020-01-02 PROCEDURE — 99214 OFFICE O/P EST MOD 30 MIN: CPT | Mod: S$GLB,,, | Performed by: NURSE PRACTITIONER

## 2020-01-02 RX ORDER — LAMOTRIGINE 25 MG/1
25 TABLET ORAL DAILY
COMMUNITY
Start: 2019-12-30 | End: 2021-02-18

## 2020-01-02 RX ORDER — METOPROLOL SUCCINATE 25 MG/1
25 TABLET, EXTENDED RELEASE ORAL
COMMUNITY
Start: 2019-11-26

## 2020-01-02 RX ORDER — IBUPROFEN 600 MG/1
600 TABLET ORAL 4 TIMES DAILY
Refills: 1 | COMMUNITY
Start: 2019-11-01 | End: 2020-09-08

## 2020-01-02 RX ORDER — ONDANSETRON HYDROCHLORIDE 8 MG/1
8 TABLET, FILM COATED ORAL EVERY 8 HOURS PRN
COMMUNITY
Start: 2019-11-18

## 2020-01-02 RX ORDER — SUCRALFATE 1 G/1
1 TABLET ORAL
COMMUNITY
Start: 2019-11-21 | End: 2020-09-08

## 2020-01-02 RX ORDER — BUSPIRONE HYDROCHLORIDE 7.5 MG/1
7.5 TABLET ORAL
COMMUNITY
Start: 2019-11-01 | End: 2021-02-17

## 2020-04-03 ENCOUNTER — LAB VISIT (OUTPATIENT)
Dept: LAB | Facility: HOSPITAL | Age: 40
End: 2020-04-03
Attending: NURSE PRACTITIONER
Payer: COMMERCIAL

## 2020-04-03 DIAGNOSIS — D51.8 OTHER VITAMIN B12 DEFICIENCY ANEMIAS: ICD-10-CM

## 2020-04-03 DIAGNOSIS — Z98.84 S/P BARIATRIC SURGERY: ICD-10-CM

## 2020-04-03 DIAGNOSIS — D50.8 OTHER IRON DEFICIENCY ANEMIAS: ICD-10-CM

## 2020-04-03 LAB
ALBUMIN SERPL BCP-MCNC: 3.2 G/DL (ref 3.5–5.2)
ALP SERPL-CCNC: 79 U/L (ref 55–135)
ALT SERPL W/O P-5'-P-CCNC: 48 U/L (ref 10–44)
ANION GAP SERPL CALC-SCNC: 9 MMOL/L (ref 8–16)
AST SERPL-CCNC: 32 U/L (ref 10–40)
BASOPHILS # BLD AUTO: 0.04 K/UL (ref 0–0.2)
BASOPHILS NFR BLD: 0.6 % (ref 0–1.9)
BILIRUB SERPL-MCNC: 0.3 MG/DL (ref 0.1–1)
BUN SERPL-MCNC: 6 MG/DL (ref 6–20)
CALCIUM SERPL-MCNC: 9.3 MG/DL (ref 8.7–10.5)
CHLORIDE SERPL-SCNC: 104 MMOL/L (ref 95–110)
CO2 SERPL-SCNC: 27 MMOL/L (ref 23–29)
CREAT SERPL-MCNC: 0.6 MG/DL (ref 0.5–1.4)
DIFFERENTIAL METHOD: ABNORMAL
EOSINOPHIL # BLD AUTO: 0.1 K/UL (ref 0–0.5)
EOSINOPHIL NFR BLD: 2.1 % (ref 0–8)
ERYTHROCYTE [DISTWIDTH] IN BLOOD BY AUTOMATED COUNT: 12.4 % (ref 11.5–14.5)
EST. GFR  (AFRICAN AMERICAN): >60 ML/MIN/1.73 M^2
EST. GFR  (NON AFRICAN AMERICAN): >60 ML/MIN/1.73 M^2
FERRITIN SERPL-MCNC: 220 NG/ML (ref 20–300)
GLUCOSE SERPL-MCNC: 102 MG/DL (ref 70–110)
HCT VFR BLD AUTO: 42.8 % (ref 37–48.5)
HGB BLD-MCNC: 13.6 G/DL (ref 12–16)
IMM GRANULOCYTES # BLD AUTO: 0.01 K/UL (ref 0–0.04)
IMM GRANULOCYTES NFR BLD AUTO: 0.1 % (ref 0–0.5)
IRON SERPL-MCNC: 120 UG/DL (ref 30–160)
LYMPHOCYTES # BLD AUTO: 2.6 K/UL (ref 1–4.8)
LYMPHOCYTES NFR BLD: 38.7 % (ref 18–48)
MCH RBC QN AUTO: 31.6 PG (ref 27–31)
MCHC RBC AUTO-ENTMCNC: 31.8 G/DL (ref 32–36)
MCV RBC AUTO: 100 FL (ref 82–98)
MONOCYTES # BLD AUTO: 0.5 K/UL (ref 0.3–1)
MONOCYTES NFR BLD: 6.7 % (ref 4–15)
NEUTROPHILS # BLD AUTO: 3.5 K/UL (ref 1.8–7.7)
NEUTROPHILS NFR BLD: 51.8 % (ref 38–73)
NRBC BLD-RTO: 0 /100 WBC
PLATELET # BLD AUTO: 338 K/UL (ref 150–350)
PMV BLD AUTO: 9.4 FL (ref 9.2–12.9)
POTASSIUM SERPL-SCNC: 4.1 MMOL/L (ref 3.5–5.1)
PROT SERPL-MCNC: 6.7 G/DL (ref 6–8.4)
RBC # BLD AUTO: 4.3 M/UL (ref 4–5.4)
SATURATED IRON: 33 % (ref 20–50)
SODIUM SERPL-SCNC: 140 MMOL/L (ref 136–145)
TOTAL IRON BINDING CAPACITY: 360 UG/DL (ref 250–450)
TRANSFERRIN SERPL-MCNC: 243 MG/DL (ref 200–375)
VIT B12 SERPL-MCNC: 378 PG/ML (ref 210–950)
WBC # BLD AUTO: 6.82 K/UL (ref 3.9–12.7)

## 2020-04-03 PROCEDURE — 82728 ASSAY OF FERRITIN: CPT

## 2020-04-03 PROCEDURE — 85025 COMPLETE CBC W/AUTO DIFF WBC: CPT

## 2020-04-03 PROCEDURE — 80053 COMPREHEN METABOLIC PANEL: CPT

## 2020-04-03 PROCEDURE — 36415 COLL VENOUS BLD VENIPUNCTURE: CPT | Mod: PO

## 2020-04-03 PROCEDURE — 83540 ASSAY OF IRON: CPT

## 2020-04-03 PROCEDURE — 82607 VITAMIN B-12: CPT

## 2020-04-07 ENCOUNTER — TELEPHONE (OUTPATIENT)
Dept: HEMATOLOGY/ONCOLOGY | Facility: CLINIC | Age: 40
End: 2020-04-07

## 2020-04-07 NOTE — TELEPHONE ENCOUNTER
----- Message from Kimberly Harrison sent at 4/7/2020  2:32 PM CDT -----  Contact: pt  Type:  Needs Medical Advice    Who Called: CHANTEL APPIAH [[id number]]  Symptoms (please be specific):   How long has patient had these symptoms:    Pharmacy name and phone #:    Would the patient rather a call back or a response via [facility]? call  Best Call Back Number: [phone] (home)   Additional Information:   Pt is requesting a call back from the nurse in regards to the pt changing her appt time for her phone call do to work please

## 2020-04-07 NOTE — PROGRESS NOTES
Subjective:       Patient ID: Shelby Meeks is a 40 y.o. female.    Chief Complaint: No chief complaint on file.    The patient location is: home - [place], [place]  The chief complaint leading to consultation is: Anemia f/u  Visit type: Virtual visit with synchronous audio only  Total time spent with patient: 15 minutes  Each patient to whom he or she provides medical services by telemedicine is:  (1) informed of the relationship between the physician and patient and the respective role of any other health care provider with respect to management of the patient; and (2) notified that he or she may decline to receive medical services by telemedicine and may withdraw from such care at any time.    HPI: Follow-up for iron def anemia and b12 deficiency s/p bariatric surgery    Last b12 injection 10/3/19 and 12/3/19- using sublinguinal B12 intermittently- Taking multivitamin with iron twice a day.    Last visit with Dr. Evan Carroll on 5/28/18-  38- year-old  lady had a gastric band in the year 2001 for weight loss and it was reversed in 2006.  She ended up having a gastric bypass in 2015.     Patient had partial hyst with unil ooph 6/10/19     She had  been taking oral iron for several months prior to the initial visit with Dr. Carroll  without any significant improvement.  Low iron and b12 levels were noted and she was given IV iron and started on supplemental b12 injections    Last IV iron was injectafer 3/23/18 and 4/2/18 and 10/21/19 and 10/28/19    Pt has her gallblader removed in August 2018  Elevated liver enzymes noted since July 2018 on [facility] labs  Pt had CT of abd/pelvis with contrast last year that was wnl per pt report     6/22/18 abdominal ultrasound revealed fatty liver    Negative hep b and c labs 11/2018 at [facility]    Pt had another ultrasound of her abd at [facility] April 2018 - fatty liver disease persists and is followed by Dr. Roberts. Had fibroscan end of Dec 2019. Elevated liver enzymes  followed by him.     Pt taking oral b12 intermittently- forgets most days    Review of Systems   Constitutional: Positive for fatigue. Negative for appetite change, fever and unexpected weight change.   HENT: Negative.    Eyes: Negative.  Negative for visual disturbance.   Respiratory: Negative.  Negative for cough and shortness of breath.    Cardiovascular: Negative.  Negative for chest pain.   Gastrointestinal: Negative.  Negative for abdominal pain, anal bleeding, blood in stool, constipation and diarrhea.   Endocrine: Negative.    Genitourinary: Negative.  Negative for frequency.   Musculoskeletal: Negative.  Negative for back pain.   Skin: Negative.  Negative for rash.   Allergic/Immunologic: Negative.    Neurological: Negative.    Hematological: Negative.  Negative for adenopathy.   Psychiatric/Behavioral: Negative.  The patient is not nervous/anxious.    Pt admits to drinking only diet soda- no water during the day      PE deferred since audio visit only today        LABS:  Results for CHANTEL APPIAH (MRN [id number]) as of 4/7/2020 16:15   Ref. Range 12/27/2019 11:17 4/3/2020 10:25   WBC Latest Ref Range: 3.90 - 12.70 K/uL 10.39 6.82   RBC Latest Ref Range: 4.00 - 5.40 M/uL 4.42 4.30   Hemoglobin Latest Ref Range: 12.0 - 16.0 g/dL 13.6 13.6   Hematocrit Latest Ref Range: 37.0 - 48.5 % 43.5 42.8   MCV Latest Ref Range: 82 - 98 fL 98 100 (H)   MCH Latest Ref Range: 27.0 - 31.0 pg 30.8 31.6 (H)   MCHC Latest Ref Range: 32.0 - 36.0 g/dL 31.3 (L) 31.8 (L)   RDW Latest Ref Range: 11.5 - 14.5 % 13.6 12.4   Platelets Latest Ref Range: 150 - 350 K/uL 402 (H) 338   MPV Latest Ref Range: 9.2 - 12.9 fL 10.1 9.4   Gran% Latest Ref Range: 38.0 - 73.0 % 81.7 (H) 51.8   Gran # (ANC) Latest Ref Range: 1.8 - 7.7 K/uL 8.5 (H) 3.5   Lymph% Latest Ref Range: 18.0 - 48.0 % 14.2 (L) 38.7   Lymph # Latest Ref Range: 1.0 - 4.8 K/uL 1.5 2.6   Mono% Latest Ref Range: 4.0 - 15.0 % 3.1 (L) 6.7   Mono # Latest Ref Range: 0.3 - 1.0  K/uL 0.3 0.5   Eosinophil% Latest Ref Range: 0.0 - 8.0 % 0.1 2.1   Eos # Latest Ref Range: 0.0 - 0.5 K/uL 0.0 0.1   Basophil% Latest Ref Range: 0.0 - 1.9 % 0.2 0.6   Baso # Latest Ref Range: 0.00 - 0.20 K/uL 0.02 0.04   nRBC Latest Ref Range: 0 /100 WBC 0 0   Differential Method Unknown Automated Automated   Immature Grans (Abs) Latest Ref Range: 0.00 - 0.04 K/uL 0.07 (H) 0.01   Immature Granulocytes Latest Ref Range: 0.0 - 0.5 % 0.7 (H) 0.1   Iron Latest Ref Range: 30 - 160 ug/dL 173 (H) 120   TIBC Latest Ref Range: 250 - 450 ug/dL 403 360   Saturated Iron Latest Ref Range: 20 - 50 % 43 33   Transferrin Latest Ref Range: 200 - 375 mg/dL 272 243   Ferritin Latest Ref Range: 20.0 - 300.0 ng/mL 326 (H) 220   Vitamin B-12 Latest Ref Range: 210 - 950 pg/mL  378   Sodium Latest Ref Range: 136 - 145 mmol/L 139 140   Potassium Latest Ref Range: 3.5 - 5.1 mmol/L 4.0 4.1   Chloride Latest Ref Range: 95 - 110 mmol/L 105 104   CO2 Latest Ref Range: 23 - 29 mmol/L 23 27   Anion Gap Latest Ref Range: 8 - 16 mmol/L 11 9   BUN, Bld Latest Ref Range: 6 - 20 mg/dL 12 6   Creatinine Latest Ref Range: 0.5 - 1.4 mg/dL 0.7 0.6   eGFR if non African American Latest Ref Range: >60 mL/min/1.73 m^2 >60.0 >60.0   eGFR if African American Latest Ref Range: >60 mL/min/1.73 m^2 >60.0 >60.0   Glucose Latest Ref Range: 70 - 110 mg/dL 160 (H) 102   Calcium Latest Ref Range: 8.7 - 10.5 mg/dL 9.5 9.3   Alkaline Phosphatase Latest Ref Range: 55 - 135 U/L 92 79   PROTEIN TOTAL Latest Ref Range: 6.0 - 8.4 g/dL 7.3 6.7   Albumin Latest Ref Range: 3.5 - 5.2 g/dL 3.8 3.2 (L)   BILIRUBIN TOTAL Latest Ref Range: 0.1 - 1.0 mg/dL 0.3 0.3   AST Latest Ref Range: 10 - 40 U/L 47 (H) 32   ALT Latest Ref Range: 10 - 44 U/L 84 (H) 48 (H)       Assessment:       1. Other iron deficiency anemias    2. Other vitamin B12 deficiency anemias    3. S/P bariatric surgery        Plan:     1.      Last IV iron 10/21/19 and 10/28/19  2.      Hgb 13.6, plts normal- workup of  elevated platelets negative for BRYANNA mutation in past, plt normal today  2.      B12 level is normal - Continue sublinguinal B12- rtc in 3 mons with cbc, cmp, b12, iron, ferritin and tibc, will do further labs in 3 mons- B1, b6, vit d, selinium, vit A and vit K one week prior to visit with me for review- taking multivitamin bid  3.       Encouraged eating iron rich foods- call for any weakness or fatigue out of the ordinary.   4.       Elevated liver enzymes and fatty liver disease followed by Dr. Roberts - noted since July 2018 at [facility] lab- recent ast and alt labs improving- denies any change in meds- no alcohol use- Hep B and C labs at [facility] -11/2018 - negative   CT of abd and pelvis with contrast wnl 7/26/18   Hida scan normal 7/9/18   Ultrasound of gallbladder revealed fatty liver 6/22/18  Recommended sublinguinal b12/b vitamins for pt to take daily. Will call if has any changes in stamina. Continue taking multivitamin.

## 2020-04-09 ENCOUNTER — OFFICE VISIT (OUTPATIENT)
Dept: HEMATOLOGY/ONCOLOGY | Facility: CLINIC | Age: 40
End: 2020-04-09
Payer: COMMERCIAL

## 2020-04-09 DIAGNOSIS — Z98.84 S/P BARIATRIC SURGERY: ICD-10-CM

## 2020-04-09 DIAGNOSIS — D51.8 OTHER VITAMIN B12 DEFICIENCY ANEMIAS: ICD-10-CM

## 2020-04-09 DIAGNOSIS — D50.8 OTHER IRON DEFICIENCY ANEMIAS: Primary | ICD-10-CM

## 2020-04-09 PROCEDURE — 99213 OFFICE O/P EST LOW 20 MIN: CPT | Mod: S$GLB,,, | Performed by: NURSE PRACTITIONER

## 2020-04-09 PROCEDURE — 99213 PR OFFICE/OUTPT VISIT, EST, LEVL III, 20-29 MIN: ICD-10-PCS | Mod: S$GLB,,, | Performed by: NURSE PRACTITIONER

## 2020-06-01 ENCOUNTER — OFFICE VISIT (OUTPATIENT)
Dept: NEUROLOGY | Facility: CLINIC | Age: 40
End: 2020-06-01
Payer: COMMERCIAL

## 2020-06-01 VITALS
HEART RATE: 84 BPM | WEIGHT: 222 LBS | DIASTOLIC BLOOD PRESSURE: 72 MMHG | BODY MASS INDEX: 36.99 KG/M2 | SYSTOLIC BLOOD PRESSURE: 120 MMHG | HEIGHT: 65 IN

## 2020-06-01 DIAGNOSIS — G43.009 MIGRAINE WITHOUT AURA AND WITHOUT STATUS MIGRAINOSUS, NOT INTRACTABLE: ICD-10-CM

## 2020-06-01 PROCEDURE — 99999 PR PBB SHADOW E&M-EST. PATIENT-LVL III: CPT | Mod: PBBFAC,,, | Performed by: PSYCHIATRY & NEUROLOGY

## 2020-06-01 PROCEDURE — 3008F BODY MASS INDEX DOCD: CPT | Mod: CPTII,S$GLB,, | Performed by: PSYCHIATRY & NEUROLOGY

## 2020-06-01 PROCEDURE — 3008F PR BODY MASS INDEX (BMI) DOCUMENTED: ICD-10-PCS | Mod: CPTII,S$GLB,, | Performed by: PSYCHIATRY & NEUROLOGY

## 2020-06-01 PROCEDURE — 99999 PR PBB SHADOW E&M-EST. PATIENT-LVL III: ICD-10-PCS | Mod: PBBFAC,,, | Performed by: PSYCHIATRY & NEUROLOGY

## 2020-06-01 PROCEDURE — 99204 PR OFFICE/OUTPT VISIT, NEW, LEVL IV, 45-59 MIN: ICD-10-PCS | Mod: S$GLB,,, | Performed by: PSYCHIATRY & NEUROLOGY

## 2020-06-01 PROCEDURE — 99204 OFFICE O/P NEW MOD 45 MIN: CPT | Mod: S$GLB,,, | Performed by: PSYCHIATRY & NEUROLOGY

## 2020-06-01 RX ORDER — TOPIRAMATE SPINKLE 15 MG/1
CAPSULE ORAL
Qty: 21 CAPSULE | Refills: 0 | Status: SHIPPED | OUTPATIENT
Start: 2020-06-01 | End: 2021-02-17

## 2020-06-01 RX ORDER — TOPIRAMATE 50 MG/1
50 TABLET, FILM COATED ORAL DAILY
Qty: 30 TABLET | Refills: 11 | Status: SHIPPED | OUTPATIENT
Start: 2020-06-01 | End: 2021-02-18 | Stop reason: SDUPTHER

## 2020-06-01 RX ORDER — SUMATRIPTAN SUCCINATE 100 MG/1
TABLET ORAL
Qty: 9 TABLET | Refills: 6 | Status: SHIPPED | OUTPATIENT
Start: 2020-06-01 | End: 2020-11-23

## 2020-06-01 RX ORDER — UBIDECARENONE 75 MG
500 CAPSULE ORAL DAILY
COMMUNITY
End: 2020-09-08

## 2020-06-01 RX ORDER — BUTALBITAL, ACETAMINOPHEN AND CAFFEINE 50; 325; 40 MG/1; MG/1; MG/1
1 TABLET ORAL EVERY 4 HOURS PRN
Qty: 25 TABLET | Refills: 1 | Status: SHIPPED | OUTPATIENT
Start: 2020-06-01 | End: 2020-06-30

## 2020-06-01 NOTE — PATIENT INSTRUCTIONS
Migraine Headache  This often severe type of headache is different from other types of headaches in that symptoms other than pain occur with the headache. Nausea and vomiting, lightheadedness, sensitivity to light (photophobia), and other visual disturbances are common migraine symptoms. The pain may last from a few hours to several days. It is not clear why migraines occur but certain factors called triggers can raise the risk of having a migraine attack. A migraine may be triggered by emotional stress or depression, or by hormone changes during the menstrual cycle. Other triggers include birth control pills, overuse of migraine medicines, alcohol or caffeine, foods with tyramine (such as aged cheese and wine), eyestrain, weather changes, missed meals, or too little or too much sleep.  Home care  Follow these tips when taking care of yourself at home:  · Dont drive yourself home if you were given pain medicine for your headache or are having visual symptoms. Instead, have someone else drive you home. Try to sleep when you get home. You should feel much better when you wake up.  · Cold can help ease migraine symptoms. Put an ice pack on your forehead or at the base of your skull. Put heat on the back of your neck to help ease any neck spasm.  · Drink only clear liquids or eat a light diet until your symptoms get better. This will help you avoid nausea and vomiting.  How to prevent migraines  Pay attention to what seems to trigger your headache. Try to avoid the triggers when you can. If you have frequent headaches, consider keeping a headache diary. In it, write down what you were doing, feeling, or eating in the hours before each headache. Show this to your healthcare provider to help find the cause of your headaches.  If stress seems to be a trigger for your headaches, figure out what is causing stress in your life. Learn new ways to handle your stress. Ideas include regular exercise, biofeedback,  self-hypnosis, yoga, and meditation. Talk with your healthcare provider to find out more information about managing stress. Many books and digital media are also available on this subject.  Tyramine is a substance found in many foods. It can trigger a migraine in some people. These foods contain tyramine:  · Chocolate  · Yogurt  · All cheeses, but especially aged cheeses  · Smoked or pickled fish and meat, including herring, caviar, bologna, pepperoni, and salami  · Liver  · Avocados  · Bananas  · Figs  · Raisins  · Red wine  Try staying away from these foods for 1 to 2 months to see if you have fewer headaches.  How to treat future headaches  · Take time out at the first sign of a headache, if possible. Find a quiet, dark, comfortable place to sit or lie down. Let yourself relax or sleep.  · Put an ice pack on your forehead or on the area of greatest pain. A heating pad and massage may help if you are having a muscle spasm and tightness in your neck.  · If you have been prescribed a medicine to stop a migraine headache, use this at the first warning sign of the headache for best results. First signs may be an aura or pain.  · If you need to take medicine often for your migraine, talk with your healthcare provider about other ways to prevent your headaches.  Follow-up care  Follow up with your healthcare provider, or as advised. Talk with your provider if you have frequent headaches. He or she can figure out a treatment plan. Ask if you can have medicine to take at home the next time you get a bad headache. This may keep you from having to visit the emergency department in the future. You may need to see a headache specialist (neurologist) if you continue to have headaches.  When to seek medical advice  Call your healthcare provider right away if any of these occur:  · Your head pain gets worse, or doesnt get better within 24 hours  · You cant keep liquids down (repeated vomiting)  · Pain in your sinuses, ears, or  "throat  · Fever of 100.4º F (38º C) or higher, or as directed by your healthcare provider  · Stiff neck  · Extreme drowsiness, confusion, or fainting  · Dizziness, or dizziness with spinning sensation (vertigo)  · Weakness in an arm or leg, or on one side of your face  · Difficulty talking or seeing  Date Last Reviewed: 8/1/2016  © 8230-2633 [employer]. [address] [place] [address], [place] [address]. All rights reserved. This information is not intended as a substitute for professional medical care. Always follow your healthcare professional's instructions.            Migraine Headache: Stages and Treatment    A migraine headache tends to progress in stages. Learning these stages can help you better understand what is happening. Then you can learn ways to reduce pain and relieve other symptoms. Methods for relieving your symptoms include self-care and medicines.  Migraine stages  Migraines tend to progress through 4 stages. Many people don't have all stages, and stages may differ with each headache:  · Prodrome. A few hours to a day or so before the headache, you may feel tired, (yawning many times), uneasy, or bernard. You may also feel bloated or crave certain foods.  · Aura. Up to an hour before the headache starts, some migraine sufferers experience aura--flashing lights, blind spots, other vision problems, confusion, difficulty speaking, or other neurologic symptoms.  · Headache. Moderate to severe pain affects one side of the head and then can spread to both sides, often along with nausea. You may be highly sensitive to light, sound, and odors. Vomiting or diarrhea may also happen. This stage lasts 4 to 72 hours.  · Postdrome. After your headache ends, you may feel tired, achy, and "washed out." This may last for a day or so.  Self-care during a migraine  Here is what you can do:  · Use a cold compress. Wrap a thin cloth around a cold pack, a cold can of soda, or a bag of frozen vegetables. Apply this " to your temple or other pain site.  · Drink fluids. If nausea makes it hard to drink, try sucking on ice.  · Rest. If possible, lie down. Try not to bend over, as this may increase your pain. Sometimes laying in a dark quiet room can help the migraine from being aggravated.    · Try caffeine. Some people find that drinking fluids with caffeine, such as coffee or tea, helps to lessen migraine pain.  Using medicines  Work with your healthcare provider to find the right medicines for you. Medicines for migraine may relieve pain (analgesics), relieve nausea, or attack the migraine's root causes (migraine-specific medicines).  Rebound headache  Taking analgesics each day, or even several times a week, may lead to more frequent and severe headaches. These are called rebound headaches. If you think you're having rebound headaches, tell your healthcare provider. He or she can help you safely decrease your medicine. Rebound caffeine withdrawal headaches can also happen.    Date Last Reviewed: 10/9/2015  © 1004-4052 [employer]. [address] [place] [address], [place] [address]. All rights reserved. This information is not intended as a substitute for professional medical care. Always follow your healthcare professional's instructions.          Preventing Migraine Headaches: Medicines and Lifestyle Changes     Going to bed and getting up at the same time each day, including weekends, may help prevent migraines.     A migraine is a type of severe headache. Having a migraine can be very painful. But there are steps you can take to help prevent migraines.  Medicines to help prevent migraines  · Your healthcare provider may prescribe certain medicines to help prevent migraines. These medicines may need to be taken daily. Or they may only need to be taken at times when youre likely to have a migraine.  · Common medicines used to help prevent migraines include:  ? Triptans (serotonin receptor agonists)  ? Nonsteroidal  anti-inflammatory drugs (available over-the-counter)  ? Beta-blockers  ? Anticonvulsants  ? Tricyclic antidepressants  ? Calcium channel blockers  ? Certain vitamins, minerals, and plant extracts  ? Botulinum toxin injection (Botox) for certain chronic migraines   ? CGRP (calcitonin gene-related peptide) agnonists are being reviewed by the Food and Drug Administration (FDA)  Lifestyle changes for long-term prevention  Here are some suggestions:  · Exercise. Regular exercise can help prevent migraines and improve your health. (If exercise triggers your migraines, talk to your healthcare provider.)  · Keep regular habits. Dont skip or delay meals. Drink plenty of water. And go to bed and get up at about the same time each day. This includes weekends.  · Try alternative treatments. These are treatments that do not involve the use of medicines or surgery. They may help relieve symptoms and prevent migraines. Some treatment options include biofeedback and acupuncture. Ask your healthcare provider to tell you more about these treatments if you have questions.  · Limit caffeine. You may find that caffeine helps relieve pain during an attack. But too much caffeine can also trigger migraines. So, limit the amount of caffeine you consume.  Date Last Reviewed: 10/11/2015  © 1717-4768 [employer]. [address] [place] [address], [place] [address]. All rights reserved. This information is not intended as a substitute for professional medical care. Always follow your healthcare professional's instructions.

## 2020-06-01 NOTE — PROGRESS NOTES
Subjective:      Patient ID: Shelby Meeks is a 40 y.o. female.    Chief Complaint: I have migraine headache     This right-handed patient indicates that as a younger individual, she had fairly frequent headaches.  In her college years and in her early 20s, the headaches seem to reduce in frequency to 1 every now and then.  About 3 years ago, she noted however of rapid increase in the frequency of the headache which she relates to being under a great deal of stress at the time.    Now, she experiences headache about 3 times a week.  The headache begins without an aura but has a gradual onset.  She does have wake up headache also.  The pain is distributed in the bifrontal area primarily but occasionally is felt in the occipital region as well.  She describes the headache pain is a constant dull pain with a pain level of 7/10.  Associated with the headache pain she will experience photophobia and incapacitation and nausea, but states that she always has nausea after her bypass surgery.  The duration of the headache is until she sleeps overnight but occasionally, the headache will continue into the next day.    When younger and before her bypass surgery, the patient found NSAIDs to be very effective in relieving the headache.  However she is no longer able to take those medications.  Tylenol is very ineffective for her.  When younger, she found Imitrex to be effective in aborting the headache as well as Fioricet.  She was recently seen by another neurologist who prescribed Lamictal but did not have any benefit on the headache.    The patient denies any changes in vision.  She is not aware of any weakness, awkwardness, or clumsiness of her arms or legs.  She denies any change in speech.  She denies any change in walking or standing balance.  She denies any numbness, tingling, or paresthesia.          ROS:  GENERAL: NO FEVER, CHILLS, FATIGABILITY OR WEIGHT LOSS.  SKIN: NO RASHES, ITCHING OR CHANGES IN COLOR OR  TEXTURE OF SKIN.  HEAD: NO RECENT HEAD TRAUMA.  EYES: VISUAL ACUITY FINE. NO PHOTOPHOBIA, OCULAR PAIN OR DIPLOPIA.  EARS: DENIES EAR PAIN, DISCHARGE OR VERTIGO.  NOSE: NO LOSS OF SMELL, NO EPISTAXIS OR POSTNASAL DRIP.  MOUTH & THROAT: NO HOARSENESS OR CHANGE IN VOICE. NO EXCESSIVE GUM BLEEDING.  NODES: DENIES SWOLLEN GLANDS.  CHEST: DENIES W[place]SH, CYANOSIS, WHEEZING, COUGH AND SPUTUM PRODUCTION.  CARDIOVASCULAR: DENIES CHEST PAIN, PND, ORTHOPNEA OR REDUCED EXERCISE TOLERANCE.  ABDOMEN: APPETITE FINE. NO WEIGHT LOSS. DENIES DIARRHEA, ABDOMINAL PAIN, HEMATEMESIS OR BLOOD IN STOOL.  URINARY: NO FLANK PAIN, DYSURIA OR HEMATURIA.  PERIPHERAL VASCULAR: NO CLAUDICATION OR CYANOSIS.  MUSCULOSKELETAL: NO JOINT STIFFNESS OR SWELLING. DENIES BACK PAIN.  NEUROLOGIC: NO HISTORY OF SEIZURES, PARALYSIS, ALTERATION OF GAIT OR COORDINATION.    Past Medical History:   Diagnosis Date    Anemia     Asthma     Childhood    Diabetes mellitus 2003    Fibromyalgia 2013    Heart disease     Hypertension 1996     Past Surgical History:   Procedure Laterality Date    CERVICAL DISC ARTHROPLASTY  04/2015    2 aritificial discs placed    GASTRIC BYPASS  07/2015    Lab band reversal  2014    LAPAROSCOPIC GASTRIC BANDING  2001    SINUS SURGERY  1996    SINUS SURGERY  2007    TONSILLECTOMY, ADENOIDECTOMY  1981     Family History   Problem Relation Age of Onset    Cancer Mother     Hypertension Mother     Cancer Father     Asthma Paternal Uncle     Cancer Maternal Grandfather      Social History     Socioeconomic History    Marital status: Single     Spouse name: Not on file    Number of children: Not on file    Years of education: Not on file    Highest education level: Not on file   Occupational History    Occupation: Teacher   Social Needs    Financial resource strain: Not on file    Food insecurity:     Worry: Not on file     Inability: Not on file    Transportation needs:     Medical: Not on file     Non-medical: Not on  file   Tobacco Use    Smoking status: Former Smoker     Packs/day: 0.50     Years: 10.00     Pack years: 5.00     Types: Cigarettes     Last attempt to quit: 2009     Years since quittin.4    Smokeless tobacco: Never Used   Substance and Sexual Activity    Alcohol use: No    Drug use: No    Sexual activity: Never     Partners: Male     Birth control/protection: Abstinence, Injection   Lifestyle    Physical activity:     Days per week: Not on file     Minutes per session: Not on file    Stress: Not on file   Relationships    Social connections:     Talks on phone: Not on file     Gets together: Not on file     Attends Church service: Not on file     Active member of club or organization: Not on file     Attends meetings of clubs or organizations: Not on file     Relationship status: Not on file   Other Topics Concern    Not on file   Social History Narrative    Not on file         Objective:   PE:   VITAL SIGNS:   Height 5 ft 5 in, weight 100.7 kg, BMI 36.94  Vitals:    20 0908   BP: 120/72   Pulse: 84     APPEARANCE: WELL NOURISHED, WELL DEVELOPED, IN NO ACUTE DISTRESS.    HEAD: NORMOCEPHALIC, ATRAUMATIC. NO TEMPORALIS MUSCLE TENDERNESS.  NO TMJ TENDERNESS.  EYES: PERRL. EOMI.  NON-ICTERIC SCLERAE.    EARS: TM'S INTACT. LIGHT REFLEX NORMAL. NO RETRACTION OR PERFORATION.    NOSE: MUCOSA PINK. AIRWAY CLEAR.  MOUTH & THROAT: NO TONSILLAR ENLARGEMENT. NO PHARYNGEAL ERYTHEMA OR EXUDATE. NO STRIDOR.  NECK: SUPPLE. NO BRUITS.  CHEST: LUNGS CLEAR TO AUSCULTATION.  CARDIOVASCULAR: REGULAR RHYTHM WITHOUT SIGNIFICANT MURMURS.  ABDOMEN: BOWEL SOUNDS NORMAL. NOT DISTENDED.  MUSCULOSKELETAL:  NO BONY DEFORMITY SEEN.  MUSCLE TONE   AND MUSCLE MASS ARE NORMAL IN BOTH UPPER AND BOTH LOWER EXTREMITIES.    NEUROLOGIC:   MENTAL STATUS:  THE PATIENT IS WELL ORIENTED TO PERSON, TIME, PLACE, AND SITUATION.  THE PATIENT IS ATTENTIVE TO THE ENVIRONMENT AND COOPERATIVE FOR THE EXAM.  CRANIAL NERVES: II-XII GROSSLY  INTACT. FUNDOSCOPIC EXAM IS NORMAL.  NO HEMORRHAGE, EXUDATE OR PAPILLEDEMA IS PRESENT. THE EXTRAOCULAR MUSCLES ARE INTACT IN THE CARDINAL DIRECTIONS OF GAZE.  NO PTOSIS IS PRESENT. FACIAL FEATURES ARE SYMMETRICAL.  SPEECH IS NORMAL IN FLUENCY, DICTION, AND PHRASING.  TONGUE PROTRUDES IN THE MIDLINE.    GAIT AND STATION:  ROMBERG IS NEGATIVE.  GOOD ALTERNATE ARMSWING WITH NORMAL GAIT.  MOTOR:  NO DOWNDRIFT OF EITHER ARM WHEN HELD AT SHOULDER LEVEL.  MANUAL MUSCLE TESTING OF PROXIMAL AND DISTAL MUSCLES OF BOTH UPPER AND LOWER EXTREMITIES IS NORMAL. MUSCLE MASS IS NORMAL.  MUSCLE TONE IS NORMAL.  SENSORY:  INTACT BOTH UPPER AND LOWER EXTREMITIES TO PIN PRICK, TOUCH, AND VIBRATION.  CEREBELLAR:  FINGER TO NOSE DONE WELL.  ALTERNATING MOVEMENTS INTACT.  NO INVOLUNTARY MOVEMENTS OR TREMOR SEEN.  REFLEXES:  STRETCH REFLEXES ARE 2+ BOTH UPPER AND LOWER EXTREMITIES.  PLANTAR STIMULATION IS FLEXOR BILATERALLY AND NO PATHOLOGICAL REFLEXES ARE SEEN              Assessment:     Encounter Diagnosis   Name Primary?    Migraine without aura and without status migrainosus, not intractable        Discussion:  The patient's history is consistent with a diagnosis of migraine headache.  In addition, she has a family history of migraine in her mother.  The patient had benefit from Imitrex in the past when much younger but was not exposed to any preventive medication other than a recent trial of Lamictal which was ineffective.  The frequency of her migraine headache would seem to indicate the need for preventive medication.      Plan:     1.  Start topiramate 15 mg a day for 1 week, then 30 mg a day for 1 week, then 50 mg a day thereafter.  This medication could be increased to 50 mg twice a day if necessary.  Side effect profile was discussed with the patient in detail.  2. Sumatriptan 100 mg at onset of migraine pain  3.  May utilize Fioricet for rescue medication if above measures are ineffective.  4.  Keep accurate headache diary and  report through the patient portal so that in medication adjustments and changes can be made more effectively.  5.  Return to Neurology as needed.      This was a 55 min visit with the patient with over 50% of the time spent counseling the patient regarding the diagnosis of migraine headache and the management of migraine headache.  This note is generated with speech recognition software and is subject to transcription error and sound alike phrases that may be missed by proofreading.

## 2020-06-30 ENCOUNTER — OFFICE VISIT (OUTPATIENT)
Dept: RHEUMATOLOGY | Facility: CLINIC | Age: 40
End: 2020-06-30
Payer: COMMERCIAL

## 2020-06-30 ENCOUNTER — LAB VISIT (OUTPATIENT)
Dept: LAB | Facility: HOSPITAL | Age: 40
End: 2020-06-30
Attending: INTERNAL MEDICINE
Payer: COMMERCIAL

## 2020-06-30 VITALS
WEIGHT: 218.69 LBS | BODY MASS INDEX: 36.44 KG/M2 | HEART RATE: 77 BPM | SYSTOLIC BLOOD PRESSURE: 102 MMHG | DIASTOLIC BLOOD PRESSURE: 74 MMHG | HEIGHT: 65 IN

## 2020-06-30 DIAGNOSIS — G93.32 CFS (CHRONIC FATIGUE SYNDROME): ICD-10-CM

## 2020-06-30 DIAGNOSIS — M79.7 FIBROMYALGIA: ICD-10-CM

## 2020-06-30 DIAGNOSIS — M35.09 SJOGREN'S SYNDROME WITH OTHER ORGAN INVOLVEMENT: ICD-10-CM

## 2020-06-30 DIAGNOSIS — M35.09 SJOGREN'S SYNDROME WITH OTHER ORGAN INVOLVEMENT: Primary | ICD-10-CM

## 2020-06-30 PROBLEM — M35.00 SJOGREN'S DISEASE: Status: ACTIVE | Noted: 2020-06-30

## 2020-06-30 LAB
ALBUMIN SERPL BCP-MCNC: 4 G/DL (ref 3.5–5.2)
ALP SERPL-CCNC: 61 U/L (ref 55–135)
ALT SERPL W/O P-5'-P-CCNC: 47 U/L (ref 10–44)
ANION GAP SERPL CALC-SCNC: 9 MMOL/L (ref 8–16)
AST SERPL-CCNC: 29 U/L (ref 10–40)
BASOPHILS # BLD AUTO: 0.04 K/UL (ref 0–0.2)
BASOPHILS NFR BLD: 0.6 % (ref 0–1.9)
BILIRUB SERPL-MCNC: 0.2 MG/DL (ref 0.1–1)
BUN SERPL-MCNC: 7 MG/DL (ref 6–20)
CALCIUM SERPL-MCNC: 9 MG/DL (ref 8.7–10.5)
CHLORIDE SERPL-SCNC: 107 MMOL/L (ref 95–110)
CO2 SERPL-SCNC: 23 MMOL/L (ref 23–29)
CREAT SERPL-MCNC: 0.7 MG/DL (ref 0.5–1.4)
CRP SERPL-MCNC: 0.4 MG/L (ref 0–8.2)
DIFFERENTIAL METHOD: ABNORMAL
EOSINOPHIL # BLD AUTO: 0.1 K/UL (ref 0–0.5)
EOSINOPHIL NFR BLD: 1.2 % (ref 0–8)
ERYTHROCYTE [DISTWIDTH] IN BLOOD BY AUTOMATED COUNT: 13 % (ref 11.5–14.5)
ERYTHROCYTE [SEDIMENTATION RATE] IN BLOOD BY WESTERGREN METHOD: 7 MM/HR (ref 0–20)
EST. GFR  (AFRICAN AMERICAN): >60 ML/MIN/1.73 M^2
EST. GFR  (NON AFRICAN AMERICAN): >60 ML/MIN/1.73 M^2
GLUCOSE SERPL-MCNC: 113 MG/DL (ref 70–110)
HCT VFR BLD AUTO: 40.8 % (ref 37–48.5)
HGB BLD-MCNC: 13.4 G/DL (ref 12–16)
IMM GRANULOCYTES # BLD AUTO: 0.02 K/UL (ref 0–0.04)
IMM GRANULOCYTES NFR BLD AUTO: 0.3 % (ref 0–0.5)
LYMPHOCYTES # BLD AUTO: 3 K/UL (ref 1–4.8)
LYMPHOCYTES NFR BLD: 45.9 % (ref 18–48)
MCH RBC QN AUTO: 31.6 PG (ref 27–31)
MCHC RBC AUTO-ENTMCNC: 32.8 G/DL (ref 32–36)
MCV RBC AUTO: 96 FL (ref 82–98)
MONOCYTES # BLD AUTO: 0.4 K/UL (ref 0.3–1)
MONOCYTES NFR BLD: 6.3 % (ref 4–15)
NEUTROPHILS # BLD AUTO: 3 K/UL (ref 1.8–7.7)
NEUTROPHILS NFR BLD: 46 % (ref 38–73)
NRBC BLD-RTO: 0 /100 WBC
PLATELET # BLD AUTO: 334 K/UL (ref 150–350)
PMV BLD AUTO: 9 FL (ref 9.2–12.9)
POTASSIUM SERPL-SCNC: 3.7 MMOL/L (ref 3.5–5.1)
PROT SERPL-MCNC: 6.9 G/DL (ref 6–8.4)
RBC # BLD AUTO: 4.24 M/UL (ref 4–5.4)
SODIUM SERPL-SCNC: 139 MMOL/L (ref 136–145)
WBC # BLD AUTO: 6.56 K/UL (ref 3.9–12.7)

## 2020-06-30 PROCEDURE — 84165 PROTEIN E-PHORESIS SERUM: CPT | Mod: 26,,, | Performed by: PATHOLOGY

## 2020-06-30 PROCEDURE — 99204 OFFICE O/P NEW MOD 45 MIN: CPT | Mod: S$GLB,,, | Performed by: INTERNAL MEDICINE

## 2020-06-30 PROCEDURE — 86235 NUCLEAR ANTIGEN ANTIBODY: CPT

## 2020-06-30 PROCEDURE — 84165 PATHOLOGIST INTERPRETATION SPE: ICD-10-PCS | Mod: 26,,, | Performed by: PATHOLOGY

## 2020-06-30 PROCEDURE — 82595 ASSAY OF CRYOGLOBULIN: CPT

## 2020-06-30 PROCEDURE — 85025 COMPLETE CBC W/AUTO DIFF WBC: CPT

## 2020-06-30 PROCEDURE — 99204 PR OFFICE/OUTPT VISIT, NEW, LEVL IV, 45-59 MIN: ICD-10-PCS | Mod: S$GLB,,, | Performed by: INTERNAL MEDICINE

## 2020-06-30 PROCEDURE — 85651 RBC SED RATE NONAUTOMATED: CPT

## 2020-06-30 PROCEDURE — 36415 COLL VENOUS BLD VENIPUNCTURE: CPT

## 2020-06-30 PROCEDURE — 84165 PROTEIN E-PHORESIS SERUM: CPT

## 2020-06-30 PROCEDURE — 86334 PATHOLOGIST INTERPRETATION IFE: ICD-10-PCS | Mod: 26,,, | Performed by: PATHOLOGY

## 2020-06-30 PROCEDURE — 86140 C-REACTIVE PROTEIN: CPT

## 2020-06-30 PROCEDURE — 80053 COMPREHEN METABOLIC PANEL: CPT

## 2020-06-30 PROCEDURE — 86160 COMPLEMENT ANTIGEN: CPT | Mod: 59

## 2020-06-30 PROCEDURE — 86160 COMPLEMENT ANTIGEN: CPT

## 2020-06-30 PROCEDURE — 99999 PR PBB SHADOW E&M-EST. PATIENT-LVL V: ICD-10-PCS | Mod: PBBFAC,,, | Performed by: INTERNAL MEDICINE

## 2020-06-30 PROCEDURE — 99999 PR PBB SHADOW E&M-EST. PATIENT-LVL V: CPT | Mod: PBBFAC,,, | Performed by: INTERNAL MEDICINE

## 2020-06-30 PROCEDURE — 86334 IMMUNOFIX E-PHORESIS SERUM: CPT | Mod: 26,,, | Performed by: PATHOLOGY

## 2020-06-30 PROCEDURE — 86334 IMMUNOFIX E-PHORESIS SERUM: CPT

## 2020-06-30 RX ORDER — MELOXICAM 15 MG/1
15 TABLET ORAL DAILY
COMMUNITY
Start: 2020-06-10 | End: 2022-03-03

## 2020-06-30 RX ORDER — PILOCARPINE HYDROCHLORIDE 5 MG/1
5 TABLET, FILM COATED ORAL 2 TIMES DAILY
Qty: 60 TABLET | Refills: 11 | Status: SHIPPED | OUTPATIENT
Start: 2020-06-30 | End: 2020-08-04

## 2020-06-30 RX ORDER — HYDROXYCHLOROQUINE SULFATE 200 MG/1
200 TABLET, FILM COATED ORAL 2 TIMES DAILY
Qty: 60 TABLET | Refills: 6 | Status: SHIPPED | OUTPATIENT
Start: 2020-06-30 | End: 2020-09-08

## 2020-06-30 NOTE — PROGRESS NOTES
RHEUMATOLOGY CLINIC INITIAL VISIT    Reason for consult:-  Diffuse body pain    Chief complaints:-  Diffuse body pain and joint pain    HPI:-  Shelby Mena a 40 y.o. pleasant female comes in for an initial visit with above chief complaints.  She has longstanding history of diffuse body pain-more than 10 years-in CTs onset, progressive in nature not responding to any medications.  Diagnosed with fibromyalgia.  Not responding to gabapentin or tizanidine.  Also has severe chronic worsening dry mouth causing multiple cavities and dental procedures.  History of dry vagina mucosa and other mucosal dryness present.  Mild intermittent itching inner eyelids which she thought was due to chronic allergies.  Mild intermittent joint swelling with prolonged stiffness all over the body.    Review of Systems   Constitutional: Positive for malaise/fatigue. Negative for chills and fever.   HENT: Negative for congestion and sore throat.    Eyes: Negative for blurred vision and redness.   Respiratory: Negative for cough and shortness of breath.    Cardiovascular: Negative for chest pain and leg swelling.   Gastrointestinal: Negative for abdominal pain.   Genitourinary: Negative for dysuria.   Musculoskeletal: Positive for back pain, joint pain, myalgias and neck pain. Negative for falls.   Skin: Negative for rash.   Neurological: Negative for headaches.   Endo/Heme/Allergies: Does not bruise/bleed easily.   Psychiatric/Behavioral: Positive for depression. Negative for memory loss. The patient is nervous/anxious and has insomnia.        Past Medical History:   Diagnosis Date    Anemia     Asthma     Childhood    Diabetes mellitus 2003    Fibromyalgia 2013    Heart disease     Hypertension 1996       Past Surgical History:   Procedure Laterality Date    CERVICAL DISC ARTHROPLASTY  04/2015    2 aritificial discs placed    GASTRIC BYPASS  07/2015    Lab band reversal  2014    LAPAROSCOPIC GASTRIC BANDING  2001     "SINUS SURGERY  1996    SINUS SURGERY  2007    TONSILLECTOMY, ADENOIDECTOMY  1981        Social History     Tobacco Use    Smoking status: Former Smoker     Packs/day: 0.50     Years: 10.00     Pack years: 5.00     Types: Cigarettes     Quit date:      Years since quittin.5    Smokeless tobacco: Never Used   Substance Use Topics    Alcohol use: No    Drug use: No       Family History   Problem Relation Age of Onset    Cancer Mother     Hypertension Mother     Cancer Father     Asthma Paternal Uncle     Cancer Maternal Grandfather        Review of patient's allergies indicates:   Allergen Reactions    Amoxicillin-pot clavulanate     Codeine     Nsaids (non-steroidal anti-inflammatory drug)        Vitals:    20 1654   BP: 102/74   Pulse: 77   Weight: 99.2 kg (218 lb 11.1 oz)   Height: 5' 5" (1.651 m)   PainSc:   7   PainLoc: Generalized       Physical Exam   Constitutional: She is oriented to person, place, and time and well-developed, well-nourished, and in no distress. No distress.   HENT:   Head: Normocephalic.   Mouth/Throat: Oropharynx is clear and moist.   Eyes: Pupils are equal, round, and reactive to light. Conjunctivae and EOM are normal.   Neck: Normal range of motion. Neck supple.   Cardiovascular: Normal rate and intact distal pulses.   Pulmonary/Chest: Effort normal. No respiratory distress.   Abdominal: Soft. There is no abdominal tenderness.   Musculoskeletal:      Comments: 18/18 tender points .  Positive for livido reticularis in lower extremities.  No synovitis over small joints of hands or feet.  No effusion over large joints.   Neurological: She is alert and oriented to person, place, and time. No cranial nerve deficit.   Skin: Skin is warm. No rash noted. No erythema.   Psychiatric: Mood and affect normal.   Nursing note and vitals reviewed.      Radiographs:-  Independent visualization of images done.    Old and Outside medical records :-  Reviewed old and all outside " medical records available in Care Everywhere.  Positive SSB antibody, negative SSA antibody, polyclonal hypergammaglobulinemia, elevated inflammatory markers    Medication List with Changes/Refills   New Medications    HYDROXYCHLOROQUINE (PLAQUENIL) 200 MG TABLET    Take 1 tablet (200 mg total) by mouth 2 (two) times daily.    NALTREXONE CAPSULE    Take 1 capsule (4.5 mg total) by mouth every evening.    PILOCARPINE (SALAGEN) 5 MG TAB    Take 1 tablet (5 mg total) by mouth 2 (two) times daily.   Current Medications    ALPRAZOLAM (XANAX) 0.25 MG TABLET    alprazolam 0.25 mg tablet   Take 1 by mouth daily    ASPIRIN (ECOTRIN) 81 MG EC TABLET    Take 81 mg by mouth once daily.     BUSPIRONE (BUSPAR) 7.5 MG TABLET    Take 7.5 mg by mouth 2 (two) times daily.    BUSPIRONE (BUSPAR) 7.5 MG TABLET    Take 7.5 mg by mouth.    BUTALBITAL-ACETAMINOPHEN-CAFFEINE -40 MG (FIORICET, ESGIC) -40 MG PER TABLET    Take 1 tablet by mouth every 4 (four) hours as needed for Pain.    CETIRIZINE (ZYRTEC) 10 MG TABLET    Take 10 mg by mouth daily as needed.     CYANOCOBALAMIN (VITAMIN B-12) 500 MCG TABLET    Take 500 mcg by mouth once daily.    CYANOCOBALAMIN 1,000 MCG/ML INJECTION    Inject 1,000 mcg into the muscle.    DICYCLOMINE (BENTYL) 20 MG TABLET    Take 20 mg by mouth as needed.     ESZOPICLONE (LUNESTA) 3 MG TAB    eszopiclone 3 mg tablet   Take 1 tablet by mouth at bedtime    FLUOXETINE (PROZAC) 20 MG CAPSULE    fluoxetine 20 mg capsule   TAKE THREE CAPSULES BY MOUTH DAILY    FLUTICASONE (FLONASE) 50 MCG/ACTUATION NASAL SPRAY    fluticasone propionate 50 mcg/actuation nasal spray,suspension    GABAPENTIN (NEURONTIN) 300 MG CAPSULE    Take 300 mg by mouth.    IBUPROFEN (ADVIL,MOTRIN) 600 MG TABLET    Take 600 mg by mouth 4 (four) times daily.    LAMOTRIGINE (LAMICTAL) 25 MG TABLET    Take 25 mg by mouth once daily.    LINACLOTIDE (LINZESS) 145 MCG CAP CAPSULE    Linzess 145 mcg capsule   TAKE ONE CAPSULE BY MOUTH 30  MINUTES PRIOR TO A MEAL    LISINOPRIL (PRINIVIL,ZESTRIL) 20 MG TABLET    Take 20 mg by mouth once daily.    MELOXICAM (MOBIC) 15 MG TABLET    Take 15 mg by mouth once daily.    METFORMIN (GLUCOPHAGE) 1000 MG TABLET    metformin 1,000 mg tablet   TAKE ONE TABLET BY MOUTH TWICE DAILY]    METOPROLOL SUCCINATE (TOPROL-XL) 25 MG 24 HR TABLET    Take 25 mg by mouth nightly.    METOPROLOL SUCCINATE (TOPROL-XL) 25 MG 24 HR TABLET    Take 25 mg by mouth.    MONTELUKAST (SINGULAIR) 10 MG TABLET    Take 10 mg by mouth once daily.     NAPROXEN (NAPROSYN) 500 MG TABLET    Take 500 mg by mouth 2 (two) times daily.    ONDANSETRON (ZOFRAN) 8 MG TABLET    Take 8 mg by mouth every 8 (eight) hours as needed.    PANTOPRAZOLE (PROTONIX) 40 MG TABLET    Take 40 mg by mouth 3 (three) times daily.    PROAIR HFA 90 MCG/ACTUATION INHALER    INHALE TWO PUFFS UP TO FOUR TIMES DAILY IF NEEDED FOR QUICK RELIEF ONLY    ROSUVASTATIN (CRESTOR) 10 MG TABLET    Take 10 mg by mouth once daily.    SITAGLIPTIN (JANUVIA) 100 MG TAB    Januvia 100 mg tablet   TAKE ONE TABLET BY MOUTH DAILY    SUCRALFATE (CARAFATE) 1 GRAM TABLET    Take 1 g by mouth 3 (three) times daily.    SUCRALFATE (CARAFATE) 1 GRAM TABLET    Take 1 g by mouth.    SUMATRIPTAN (IMITREX) 100 MG TABLET    Take 1 tablet at onset of migraine.  May repeat 1 tablet in 2 hr if needed.  Limited to 3 tablets a week.    TOPIRAMATE (TOPAMAX) 15 MG CAPSULE    Take 1 capsule once a day for 7 days, then 2 capsules once a day for 7 days then switch to 50 mg tablet    TOPIRAMATE (TOPAMAX) 50 MG TABLET    Take 1 tablet (50 mg total) by mouth once daily.   Discontinued Medications    TIZANIDINE (ZANAFLEX) 4 MG TABLET    Take 2 mg by mouth as needed.        Assessment/Plans:-  1. Sjogren's syndrome with other organ involvement    2. Fibromyalgia    3. CFS (chronic fatigue syndrome)      Problem List Items Addressed This Visit     Sjogren's disease - Primary    Current Assessment & Plan     Chronic severe  worsening dry mouth, dry mucosa including vagina associated with positive SSB antibody highly suggestive of Sjogren syndrome even with negative SSA antibody.  She also has elevated inflammatory markers and polyclonal hypergammaglobulinemia in the past.  No indication for minor salivary gland biopsy to confirm the diagnosis.  Start treatment.  Start hydroxychloroquine for immunomodulation and pilocarpine for dry mouth.  Avoid corticosteroid because of palpitations in the past.         Relevant Medications    naltrexone capsule    hydroxychloroquine (PLAQUENIL) 200 mg tablet    pilocarpine (SALAGEN) 5 MG Tab    Other Relevant Orders    Protein electrophoresis, serum    Immunofixation electrophoresis    CBC auto differential    Comprehensive metabolic panel    C3 complement    C4 complement    Cryoglobulin    Sedimentation rate    C-Reactive Protein    MyoMarker Panel 3    Fibromyalgia    Current Assessment & Plan     Severely active fibromyalgia with high widespread pain index and severe fatigue.  Start low-dose naltrexone.         Relevant Medications    naltrexone capsule    Other Relevant Orders    Protein electrophoresis, serum    Immunofixation electrophoresis    CBC auto differential    Comprehensive metabolic panel    C3 complement    C4 complement    Cryoglobulin    Sedimentation rate    C-Reactive Protein    CFS (chronic fatigue syndrome)    Current Assessment & Plan     Start low-dose naltrexone for chronic fatigue associated with fibromyalgia probably secondary to Sjogren syndrome.         Relevant Medications    naltrexone capsule    Other Relevant Orders    Protein electrophoresis, serum    Immunofixation electrophoresis    CBC auto differential    Comprehensive metabolic panel    C3 complement    C4 complement    Cryoglobulin    Sedimentation rate    C-Reactive Protein          Follow up in about 4 weeks (around 7/28/2020).    Thank you for allowing me to participate in the care ofShelby Naylor  Constantino.    Disclaimer: This note was prepared using voice recognition system and is likely to have sound alike errors and is not proof read.  Please call me with any questions.

## 2020-07-01 LAB
C3 SERPL-MCNC: 153 MG/DL (ref 50–180)
C4 SERPL-MCNC: 46 MG/DL (ref 11–44)

## 2020-07-01 NOTE — ASSESSMENT & PLAN NOTE
Chronic severe worsening dry mouth, dry mucosa including vagina associated with positive SSB antibody highly suggestive of Sjogren syndrome even with negative SSA antibody.  She also has elevated inflammatory markers and polyclonal hypergammaglobulinemia in the past.  No indication for minor salivary gland biopsy to confirm the diagnosis.  Start treatment.  Start hydroxychloroquine for immunomodulation and pilocarpine for dry mouth.  Avoid corticosteroid because of palpitations in the past.

## 2020-07-01 NOTE — ASSESSMENT & PLAN NOTE
Start low-dose naltrexone for chronic fatigue associated with fibromyalgia probably secondary to Sjogren syndrome.

## 2020-07-01 NOTE — ASSESSMENT & PLAN NOTE
Severely active fibromyalgia with high widespread pain index and severe fatigue.  Start low-dose naltrexone.

## 2020-07-02 LAB
ALBUMIN SERPL ELPH-MCNC: 4 G/DL (ref 3.35–5.55)
ALPHA1 GLOB SERPL ELPH-MCNC: 0.27 G/DL (ref 0.17–0.41)
ALPHA2 GLOB SERPL ELPH-MCNC: 0.9 G/DL (ref 0.43–0.99)
B-GLOBULIN SERPL ELPH-MCNC: 0.87 G/DL (ref 0.5–1.1)
GAMMA GLOB SERPL ELPH-MCNC: 0.56 G/DL (ref 0.67–1.58)
INTERPRETATION SERPL IFE-IMP: NORMAL
PATHOLOGIST INTERPRETATION IFE: NORMAL
PATHOLOGIST INTERPRETATION SPE: NORMAL
PROT SERPL-MCNC: 6.6 G/DL (ref 6–8.4)

## 2020-07-13 LAB — CRYOGLOB SER QL: NORMAL

## 2020-07-27 LAB
ANTI-PM/SCL AB: <20 UNITS
ANTI-SS-A 52 KD AB, IGG: <20 UNITS
EJ AB SER QL: NEGATIVE
ENA JO1 AB SER IA-ACNC: <20 UNITS
ENA SM+RNP AB SER IA-ACNC: <20 UNITS
FIBRILLARIN (U3 RNP): NEGATIVE
KU AB SER QL: NEGATIVE
MDA-5 (P140): <20 UNITS
MI2 AB SER QL: NEGATIVE
NXP-2 (P140): <20 UNITS
OJ AB SER QL: NEGATIVE
PL12 AB SER QL: NEGATIVE
PL7 AB SER QL: NEGATIVE
SRP AB SERPL QL: NEGATIVE
TIF1 GAMMA (P155/140): <20 UNITS
U2 SNRNP: NEGATIVE

## 2020-08-03 ENCOUNTER — TELEPHONE (OUTPATIENT)
Dept: RHEUMATOLOGY | Facility: CLINIC | Age: 40
End: 2020-08-03

## 2020-08-03 NOTE — TELEPHONE ENCOUNTER
Spoke with pt and confirmed appointment with Dr. Morales for 8.4.20 at 3.45 pm. Changed to virtual visit.

## 2020-08-04 ENCOUNTER — TELEPHONE (OUTPATIENT)
Dept: RHEUMATOLOGY | Facility: CLINIC | Age: 40
End: 2020-08-04

## 2020-08-04 ENCOUNTER — OFFICE VISIT (OUTPATIENT)
Dept: RHEUMATOLOGY | Facility: CLINIC | Age: 40
End: 2020-08-04
Payer: COMMERCIAL

## 2020-08-04 DIAGNOSIS — M35.09 SJOGREN'S SYNDROME WITH OTHER ORGAN INVOLVEMENT: Primary | ICD-10-CM

## 2020-08-04 DIAGNOSIS — M79.7 FIBROMYALGIA: ICD-10-CM

## 2020-08-04 DIAGNOSIS — G93.32 CFS (CHRONIC FATIGUE SYNDROME): ICD-10-CM

## 2020-08-04 PROCEDURE — 99214 OFFICE O/P EST MOD 30 MIN: CPT | Mod: 95,,, | Performed by: INTERNAL MEDICINE

## 2020-08-04 PROCEDURE — 99214 PR OFFICE/OUTPT VISIT, EST, LEVL IV, 30-39 MIN: ICD-10-PCS | Mod: 95,,, | Performed by: INTERNAL MEDICINE

## 2020-08-04 RX ORDER — PILOCARPINE HYDROCHLORIDE 7.5 MG/1
7.5 TABLET, FILM COATED ORAL
Qty: 90 TABLET | Refills: 11 | Status: SHIPPED | OUTPATIENT
Start: 2020-08-04 | End: 2021-05-18

## 2020-08-04 NOTE — TELEPHONE ENCOUNTER
----- Message from Wesly Morales MD sent at 8/4/2020  3:59 PM CDT -----  My chart video visit in 4/6 weeks.

## 2020-08-04 NOTE — PROGRESS NOTES
RHEUMATOLOGY FOLLOW UP - TELE VISIT     The patient location is: [place]  The chief complaint leading to consultation is:  Persistent dry mouth mild improvement in pain  Visit type: Virtual visit with synchronous audio and video  Total time spent with patient: 10 minutes  Each patient to whom he or she provides medical services by telemedicine is:  (1) informed of the relationship between the physician and patient and the respective role of any other health care provider with respect to management of the patient; and (2) notified that he or she may decline to receive medical services by telemedicine and may withdraw from such care at any time.    HPI:-  Shelby eMna a 40 y.o. pleasant female seen today through My chart video visit for follow up.  She has seen around 20% improvement in her fibromyalgia pain since starting naltrexone.  She denies any improvement in dry mouth with pilocarpine.  No adverse effects from Plaquenil.  No significant improvement in terms of chronic fatigue.  No photosensitive malar rash, Raynaud's phenomenon.  No joint swelling.  Filling inflamed all over.    Review of Systems   Constitutional: Positive for malaise/fatigue. Negative for chills and fever.   HENT: Negative for congestion and sore throat.    Eyes: Negative for blurred vision and redness.   Respiratory: Negative for cough and shortness of breath.    Cardiovascular: Negative for chest pain and leg swelling.   Gastrointestinal: Negative for abdominal pain.   Genitourinary: Negative for dysuria.   Musculoskeletal: Positive for back pain, joint pain, myalgias and neck pain. Negative for falls.   Skin: Negative for rash.   Neurological: Negative for headaches.   Endo/Heme/Allergies: Does not bruise/bleed easily.   Psychiatric/Behavioral: Positive for depression. Negative for memory loss. The patient is nervous/anxious and has insomnia.        Past Medical History:   Diagnosis Date    Anemia     Asthma     Childhood     Diabetes mellitus 2003    Fibromyalgia     Heart disease     Hypertension 1996       Past Surgical History:   Procedure Laterality Date    CERVICAL DISC ARTHROPLASTY  2015    2 aritificial discs placed    GASTRIC BYPASS  2015    Lab band reversal      LAPAROSCOPIC GASTRIC BANDING  2001    SINUS SURGERY  1996    SINUS SURGERY  2007    TONSILLECTOMY, ADENOIDECTOMY  1981        Social History     Tobacco Use    Smoking status: Former Smoker     Packs/day: 0.50     Years: 10.00     Pack years: 5.00     Types: Cigarettes     Quit date:      Years since quittin.5    Smokeless tobacco: Never Used   Substance Use Topics    Alcohol use: No    Drug use: No       Family History   Problem Relation Age of Onset    Cancer Mother     Hypertension Mother     Cancer Father     Asthma Paternal Uncle     Cancer Maternal Grandfather        Review of patient's allergies indicates:   Allergen Reactions    Amoxicillin-pot clavulanate     Codeine     Nsaids (non-steroidal anti-inflammatory drug)        Physical exam:-    GEN: awake, alert, non-diaphoretic, no psychomotor agitation, no acute distress    HEENT :Head: atraumatic, normocephalic, no rashes noted, no lesions noted;    Eyes: NO redness, discharge, swelling, or lesions    Nose: NO redness, swelling, discharge, deformity, or impetigo/crusting    Skin: no lesions, wounds, erythema, or cyanosis noted on face or hands    Cardiopulmonary: no increased respiratory effort, speaking in clear sentences    MSK: normal ROM in the neck, Upper extremities, Lower extremities  Good ROM of hands, fist formation 100% and , no obvious synovitis    Good ambulation in front of the camera    Neuro: cranial nerves grossly normal, speech normal rate and rhythm, orientation arrived to appointment on time with no prompting, moved both upper extremities equally    Pysch:  appearance, behavior, and attitude- well groomed, pleasant, cooperative    Medication List  with Changes/Refills   Current Medications    ALPRAZOLAM (XANAX) 0.25 MG TABLET    alprazolam 0.25 mg tablet   Take 1 by mouth daily    ASPIRIN (ECOTRIN) 81 MG EC TABLET    Take 81 mg by mouth once daily.     BUSPIRONE (BUSPAR) 7.5 MG TABLET    Take 7.5 mg by mouth 2 (two) times daily.    BUSPIRONE (BUSPAR) 7.5 MG TABLET    Take 7.5 mg by mouth.    BUTALBITAL-ACETAMINOPHEN-CAFFEINE -40 MG (FIORICET, ESGIC) -40 MG PER TABLET    Take 1 tablet by mouth every 4 (four) hours as needed for Pain.    CETIRIZINE (ZYRTEC) 10 MG TABLET    Take 10 mg by mouth daily as needed.     CYANOCOBALAMIN (VITAMIN B-12) 500 MCG TABLET    Take 500 mcg by mouth once daily.    CYANOCOBALAMIN 1,000 MCG/ML INJECTION    Inject 1,000 mcg into the muscle.    DICYCLOMINE (BENTYL) 20 MG TABLET    Take 20 mg by mouth as needed.     ESZOPICLONE (LUNESTA) 3 MG TAB    eszopiclone 3 mg tablet   Take 1 tablet by mouth at bedtime    FLUOXETINE (PROZAC) 20 MG CAPSULE    fluoxetine 20 mg capsule   TAKE THREE CAPSULES BY MOUTH DAILY    FLUTICASONE (FLONASE) 50 MCG/ACTUATION NASAL SPRAY    fluticasone propionate 50 mcg/actuation nasal spray,suspension    GABAPENTIN (NEURONTIN) 300 MG CAPSULE    Take 300 mg by mouth.    HYDROXYCHLOROQUINE (PLAQUENIL) 200 MG TABLET    Take 1 tablet (200 mg total) by mouth 2 (two) times daily.    IBUPROFEN (ADVIL,MOTRIN) 600 MG TABLET    Take 600 mg by mouth 4 (four) times daily.    LAMOTRIGINE (LAMICTAL) 25 MG TABLET    Take 25 mg by mouth once daily.    LINACLOTIDE (LINZESS) 145 MCG CAP CAPSULE    Linzess 145 mcg capsule   TAKE ONE CAPSULE BY MOUTH 30 MINUTES PRIOR TO A MEAL    LISINOPRIL (PRINIVIL,ZESTRIL) 20 MG TABLET    Take 20 mg by mouth once daily.    MELOXICAM (MOBIC) 15 MG TABLET    Take 15 mg by mouth once daily.    METFORMIN (GLUCOPHAGE) 1000 MG TABLET    metformin 1,000 mg tablet   TAKE ONE TABLET BY MOUTH TWICE DAILY]    METOPROLOL SUCCINATE (TOPROL-XL) 25 MG 24 HR TABLET    Take 25 mg by mouth nightly.     METOPROLOL SUCCINATE (TOPROL-XL) 25 MG 24 HR TABLET    Take 25 mg by mouth.    MONTELUKAST (SINGULAIR) 10 MG TABLET    Take 10 mg by mouth once daily.     NAPROXEN (NAPROSYN) 500 MG TABLET    Take 500 mg by mouth 2 (two) times daily.    ONDANSETRON (ZOFRAN) 8 MG TABLET    Take 8 mg by mouth every 8 (eight) hours as needed.    PANTOPRAZOLE (PROTONIX) 40 MG TABLET    Take 40 mg by mouth 3 (three) times daily.    PROAIR HFA 90 MCG/ACTUATION INHALER    INHALE TWO PUFFS UP TO FOUR TIMES DAILY IF NEEDED FOR QUICK RELIEF ONLY    ROSUVASTATIN (CRESTOR) 10 MG TABLET    Take 10 mg by mouth once daily.    SITAGLIPTIN (JANUVIA) 100 MG TAB    Januvia 100 mg tablet   TAKE ONE TABLET BY MOUTH DAILY    SUCRALFATE (CARAFATE) 1 GRAM TABLET    Take 1 g by mouth 3 (three) times daily.    SUCRALFATE (CARAFATE) 1 GRAM TABLET    Take 1 g by mouth.    SUMATRIPTAN (IMITREX) 100 MG TABLET    Take 1 tablet at onset of migraine.  May repeat 1 tablet in 2 hr if needed.  Limited to 3 tablets a week.    TOPIRAMATE (TOPAMAX) 15 MG CAPSULE    Take 1 capsule once a day for 7 days, then 2 capsules once a day for 7 days then switch to 50 mg tablet    TOPIRAMATE (TOPAMAX) 50 MG TABLET    Take 1 tablet (50 mg total) by mouth once daily.   Changed and/or Refilled Medications    Modified Medication Previous Medication    NALTREXONE CAPSULE naltrexone capsule       Take 6 mg once at bedtime daily.    Take 1 capsule (4.5 mg total) by mouth every evening.    PILOCARPINE (SALAGEN) 7.5 MG TABLET pilocarpine (SALAGEN) 5 MG Tab       Take 1 tablet (7.5 mg total) by mouth before meals as needed.    Take 1 tablet (5 mg total) by mouth 2 (two) times daily.       Assessment/Plans:-  1. Sjogren's syndrome with other organ involvement    2. Fibromyalgia    3. CFS (chronic fatigue syndrome)      · Seronegative severe sicca syndrome suggestive of Sjogren's.  NORMAL ACTIVITY MARKERS.  Continue Plaquenil.  · Mild improvement with low-dose naltrexone.  Increase dose to  6 mg daily.  · Increase dose of pilocarpine to help with dry mouth.  ·   Problem List Items Addressed This Visit     Sjogren's disease - Primary    Relevant Medications    naltrexone capsule    pilocarpine (SALAGEN) 7.5 mg tablet    Fibromyalgia    Relevant Medications    naltrexone capsule    CFS (chronic fatigue syndrome)    Relevant Medications    naltrexone capsule          Follow up in about 4 weeks (around 9/1/2020).    Disclaimer: This note was prepared using voice recognition system and is likely to have sound alike errors and is not proof read.  Please call me with any questions.

## 2020-08-12 DIAGNOSIS — R53.82 CHRONIC FATIGUE: ICD-10-CM

## 2020-08-12 DIAGNOSIS — Z98.84 S/P BARIATRIC SURGERY: Primary | ICD-10-CM

## 2020-08-12 DIAGNOSIS — D51.3 OTHER DIETARY VITAMIN B12 DEFICIENCY ANEMIA: ICD-10-CM

## 2020-08-12 DIAGNOSIS — D50.8 OTHER IRON DEFICIENCY ANEMIAS: ICD-10-CM

## 2020-08-12 DIAGNOSIS — D50.8 IRON DEFICIENCY ANEMIA SECONDARY TO INADEQUATE DIETARY IRON INTAKE: ICD-10-CM

## 2020-08-12 DIAGNOSIS — D75.839 THROMBOCYTOSIS: ICD-10-CM

## 2020-08-13 ENCOUNTER — LAB VISIT (OUTPATIENT)
Dept: LAB | Facility: HOSPITAL | Age: 40
End: 2020-08-13
Attending: NURSE PRACTITIONER
Payer: COMMERCIAL

## 2020-08-13 DIAGNOSIS — D75.839 THROMBOCYTOSIS: ICD-10-CM

## 2020-08-13 DIAGNOSIS — Z98.84 S/P BARIATRIC SURGERY: ICD-10-CM

## 2020-08-13 DIAGNOSIS — D50.8 OTHER IRON DEFICIENCY ANEMIAS: ICD-10-CM

## 2020-08-13 DIAGNOSIS — D50.8 IRON DEFICIENCY ANEMIA SECONDARY TO INADEQUATE DIETARY IRON INTAKE: ICD-10-CM

## 2020-08-13 DIAGNOSIS — D51.3 OTHER DIETARY VITAMIN B12 DEFICIENCY ANEMIA: ICD-10-CM

## 2020-08-13 DIAGNOSIS — R53.82 CHRONIC FATIGUE: ICD-10-CM

## 2020-08-13 LAB
ALBUMIN SERPL BCP-MCNC: 4.2 G/DL (ref 3.5–5.2)
ALP SERPL-CCNC: 68 U/L (ref 55–135)
ALT SERPL W/O P-5'-P-CCNC: 39 U/L (ref 10–44)
ANION GAP SERPL CALC-SCNC: 9 MMOL/L (ref 8–16)
AST SERPL-CCNC: 28 U/L (ref 10–40)
BASOPHILS # BLD AUTO: 0.04 K/UL (ref 0–0.2)
BASOPHILS NFR BLD: 0.6 % (ref 0–1.9)
BILIRUB SERPL-MCNC: 0.2 MG/DL (ref 0.1–1)
BUN SERPL-MCNC: 14 MG/DL (ref 6–20)
CALCIUM SERPL-MCNC: 9.2 MG/DL (ref 8.7–10.5)
CHLORIDE SERPL-SCNC: 104 MMOL/L (ref 95–110)
CO2 SERPL-SCNC: 21 MMOL/L (ref 23–29)
CREAT SERPL-MCNC: 0.7 MG/DL (ref 0.5–1.4)
DIFFERENTIAL METHOD: ABNORMAL
EOSINOPHIL # BLD AUTO: 0.1 K/UL (ref 0–0.5)
EOSINOPHIL NFR BLD: 1.8 % (ref 0–8)
ERYTHROCYTE [DISTWIDTH] IN BLOOD BY AUTOMATED COUNT: 12.8 % (ref 11.5–14.5)
EST. GFR  (AFRICAN AMERICAN): >60 ML/MIN/1.73 M^2
EST. GFR  (NON AFRICAN AMERICAN): >60 ML/MIN/1.73 M^2
FERRITIN SERPL-MCNC: 268 NG/ML (ref 20–300)
GLUCOSE SERPL-MCNC: 98 MG/DL (ref 70–110)
HCT VFR BLD AUTO: 42.9 % (ref 37–48.5)
HGB BLD-MCNC: 13.5 G/DL (ref 12–16)
IMM GRANULOCYTES # BLD AUTO: 0.03 K/UL (ref 0–0.04)
IMM GRANULOCYTES NFR BLD AUTO: 0.4 % (ref 0–0.5)
IRON SERPL-MCNC: 65 UG/DL (ref 30–160)
LYMPHOCYTES # BLD AUTO: 2.7 K/UL (ref 1–4.8)
LYMPHOCYTES NFR BLD: 37.1 % (ref 18–48)
MCH RBC QN AUTO: 31.6 PG (ref 27–31)
MCHC RBC AUTO-ENTMCNC: 31.5 G/DL (ref 32–36)
MCV RBC AUTO: 101 FL (ref 82–98)
MONOCYTES # BLD AUTO: 0.5 K/UL (ref 0.3–1)
MONOCYTES NFR BLD: 6.5 % (ref 4–15)
NEUTROPHILS # BLD AUTO: 3.9 K/UL (ref 1.8–7.7)
NEUTROPHILS NFR BLD: 53.6 % (ref 38–73)
NRBC BLD-RTO: 0 /100 WBC
PLATELET # BLD AUTO: 375 K/UL (ref 150–350)
PMV BLD AUTO: 9.9 FL (ref 9.2–12.9)
POTASSIUM SERPL-SCNC: 4.1 MMOL/L (ref 3.5–5.1)
PROT SERPL-MCNC: 7 G/DL (ref 6–8.4)
RBC # BLD AUTO: 4.27 M/UL (ref 4–5.4)
SATURATED IRON: 17 % (ref 20–50)
SODIUM SERPL-SCNC: 134 MMOL/L (ref 136–145)
TOTAL IRON BINDING CAPACITY: 376 UG/DL (ref 250–450)
TRANSFERRIN SERPL-MCNC: 254 MG/DL (ref 200–375)
TSH SERPL DL<=0.005 MIU/L-ACNC: 0.77 UIU/ML (ref 0.4–4)
WBC # BLD AUTO: 7.23 K/UL (ref 3.9–12.7)

## 2020-08-13 PROCEDURE — 85025 COMPLETE CBC W/AUTO DIFF WBC: CPT

## 2020-08-13 PROCEDURE — 84590 ASSAY OF VITAMIN A: CPT

## 2020-08-13 PROCEDURE — 82306 VITAMIN D 25 HYDROXY: CPT

## 2020-08-13 PROCEDURE — 82607 VITAMIN B-12: CPT

## 2020-08-13 PROCEDURE — 80053 COMPREHEN METABOLIC PANEL: CPT

## 2020-08-13 PROCEDURE — 84255 ASSAY OF SELENIUM: CPT

## 2020-08-13 PROCEDURE — 84207 ASSAY OF VITAMIN B-6: CPT

## 2020-08-13 PROCEDURE — 84443 ASSAY THYROID STIM HORMONE: CPT

## 2020-08-13 PROCEDURE — 36415 COLL VENOUS BLD VENIPUNCTURE: CPT | Mod: PO

## 2020-08-13 PROCEDURE — 82728 ASSAY OF FERRITIN: CPT

## 2020-08-13 PROCEDURE — 83540 ASSAY OF IRON: CPT

## 2020-08-13 PROCEDURE — 84436 ASSAY OF TOTAL THYROXINE: CPT

## 2020-08-13 PROCEDURE — 84597 ASSAY OF VITAMIN K: CPT

## 2020-08-14 LAB
25(OH)D3+25(OH)D2 SERPL-MCNC: 50 NG/ML (ref 30–96)
T4 SERPL-MCNC: 5.8 UG/DL (ref 4.5–11.5)
VIT B12 SERPL-MCNC: >2000 PG/ML (ref 210–950)

## 2020-08-18 LAB
PYRIDOXAL SERPL-MCNC: 19 UG/L (ref 5–50)
VIT A SERPL-MCNC: 93 UG/DL (ref 38–106)

## 2020-08-19 NOTE — PROGRESS NOTES
Subjective:       Patient ID: Shelby Meeks is a 40 y.o. female.    Chief Complaint: iron def anemia (f/u)    The patient location is: [place], [place]  The chief complaint leading to consultation is: review labs- iron def anemia secondary to malabsorption- bariatric surgery    Visit type: audio/visual    Face to Face time with patient: 20 minutes of total time spent on the encounter, which includes face to face time and non-face to face time preparing to see the patient (eg, review of tests), Obtaining and/or reviewing separately obtained history, Documenting clinical information in the electronic or other health record, Independently interpreting results (not separately reported) and communicating results to the patient/family/caregiver, or Care coordination (not separately reported).     Each patient to whom he or she provides medical services by telemedicine is:  (1) informed of the relationship between the physician and patient and the respective role of any other health care provider with respect to management of the patient; and (2) notified that he or she may decline to receive medical services by telemedicine and may withdraw from such care at any time.      HPI: Follow-up for iron def anemia and b12 deficiency s/p bariatric surgery    Last b12 injection 10/3/19 and 12/3/19- using sublinguinal B12 intermittently    Last visit with Dr. Evan Carroll on 5/28/18-  38- year-old  lady had a gastric band in the year 2001 for weight loss and it was reversed in 2006.  She ended up having a gastric bypass in 2015.     Patient had partial hyst with unil ooph 6/10/19     She had  been taking oral iron for several months prior to the initial visit with Dr. Carroll  without any significant improvement.  Low iron and b12 levels were noted and she was given IV iron and started on supplemental b12 injections    Last IV iron was injectafer 3/23/18 and 4/2/18 and 10/21/19 and 10/28/19    Pt has her toñor  removed in August 2018  Elevated liver enzymes noted since July 2018 on [facility] labs  Pt had CT of abd/pelvis with contrast last year that was wnl per pt report     6/22/18 abdominal ultrasound revealed fatty liver    Negative hep b and c labs 11/2018 at [facility]    Pt had another ultrasound of her abd at [facility] April 2018 - fatty liver disease persists and is followed by Dr. Roberts. Seeing him today.     Review of Systems   Constitutional: Positive for fatigue. Negative for appetite change, fever and unexpected weight change.   HENT: Negative.    Eyes: Negative.  Negative for visual disturbance.   Respiratory: Negative.  Negative for cough and shortness of breath.    Cardiovascular: Negative.  Negative for chest pain.   Gastrointestinal: Negative.  Negative for abdominal pain, anal bleeding, blood in stool, constipation and diarrhea.   Endocrine: Negative.    Genitourinary: Negative.  Negative for frequency.   Musculoskeletal: Negative.  Negative for back pain.   Skin: Negative.  Negative for rash.   Allergic/Immunologic: Negative.    Neurological: Negative.    Hematological: Negative.  Negative for adenopathy.   Psychiatric/Behavioral: Negative.  The patient is not nervous/anxious.    Pt admits to drinking only diet soda- no water during the day    Objective:      Physical Exam  Constitutional:       Appearance: Normal appearance. She is well-developed.   HENT:      Head: Normocephalic and atraumatic.      Mouth/Throat:      Pharynx: No oropharyngeal exudate.   Eyes:      General: No scleral icterus.        Right eye: No discharge.         Left eye: No discharge.      Conjunctiva/sclera: Conjunctivae normal.      Pupils: Pupils are equal, round, and reactive to light.   Neck:      Musculoskeletal: Normal range of motion and neck supple.      Thyroid: No thyromegaly.   Pulmonary:      Effort: Pulmonary effort is normal.   Chest:      Breasts:         Right: No inverted nipple, mass, nipple discharge, skin change or  tenderness.         Left: No inverted nipple, mass, nipple discharge, skin change or tenderness.   Musculoskeletal:      Right shoulder: She exhibits no crepitus and normal strength.   Lymphadenopathy:      Head:      Right side of head: No submental, submandibular, tonsillar, preauricular, posterior auricular or occipital adenopathy.      Left side of head: No submental, submandibular, tonsillar, preauricular, posterior auricular or occipital adenopathy.      Cervical:      Right cervical: No superficial or posterior cervical adenopathy.     Left cervical: No superficial or posterior cervical adenopathy.      Upper Body:      Right upper body: No supraclavicular adenopathy.      Left upper body: No supraclavicular adenopathy.   Skin:     Coloration: Skin is not pale.      Findings: No erythema or rash.   Neurological:      Mental Status: She is alert and oriented to person, place, and time.      Deep Tendon Reflexes: Reflexes are normal and symmetric.   Psychiatric:         Behavior: Behavior normal.         Thought Content: Thought content normal.         Judgment: Judgment normal.           LABS:  Results for CHANTEL APPIAH (MRN [id number]) as of 8/19/2020 15:54   Ref. Range 4/3/2020 10:25 6/30/2020 17:54 8/13/2020 15:19   WBC Latest Ref Range: 3.90 - 12.70 K/uL 6.82 6.56 7.23   RBC Latest Ref Range: 4.00 - 5.40 M/uL 4.30 4.24 4.27   Hemoglobin Latest Ref Range: 12.0 - 16.0 g/dL 13.6 13.4 13.5   Hematocrit Latest Ref Range: 37.0 - 48.5 % 42.8 40.8 42.9   MCV Latest Ref Range: 82 - 98 fL 100 (H) 96 101 (H)   MCH Latest Ref Range: 27.0 - 31.0 pg 31.6 (H) 31.6 (H) 31.6 (H)   MCHC Latest Ref Range: 32.0 - 36.0 g/dL 31.8 (L) 32.8 31.5 (L)   RDW Latest Ref Range: 11.5 - 14.5 % 12.4 13.0 12.8   Platelets Latest Ref Range: 150 - 350 K/uL 338 334 375 (H)   MPV Latest Ref Range: 9.2 - 12.9 fL 9.4 9.0 (L) 9.9   Gran% Latest Ref Range: 38.0 - 73.0 % 51.8 46.0 53.6   Gran # (ANC) Latest Ref Range: 1.8 - 7.7 K/uL 3.5 3.0  3.9   Lymph% Latest Ref Range: 18.0 - 48.0 % 38.7 45.9 37.1   Lymph # Latest Ref Range: 1.0 - 4.8 K/uL 2.6 3.0 2.7   Mono% Latest Ref Range: 4.0 - 15.0 % 6.7 6.3 6.5   Mono # Latest Ref Range: 0.3 - 1.0 K/uL 0.5 0.4 0.5   Eosinophil% Latest Ref Range: 0.0 - 8.0 % 2.1 1.2 1.8   Eos # Latest Ref Range: 0.0 - 0.5 K/uL 0.1 0.1 0.1   Basophil% Latest Ref Range: 0.0 - 1.9 % 0.6 0.6 0.6   Baso # Latest Ref Range: 0.00 - 0.20 K/uL 0.04 0.04 0.04   nRBC Latest Ref Range: 0 /100 WBC 0 0 0   Differential Method Unknown Automated Automated Automated   Immature Grans (Abs) Latest Ref Range: 0.00 - 0.04 K/uL 0.01 0.02 0.03   Immature Granulocytes Latest Ref Range: 0.0 - 0.5 % 0.1 0.3 0.4   Iron Latest Ref Range: 30 - 160 ug/dL 120  65   TIBC Latest Ref Range: 250 - 450 ug/dL 360  376   Saturated Iron Latest Ref Range: 20 - 50 % 33  17 (L)   Transferrin Latest Ref Range: 200 - 375 mg/dL 243  254   Ferritin Latest Ref Range: 20.0 - 300.0 ng/mL 220  268   Vitamin B-12 Latest Ref Range: 210 - 950 pg/mL 378  >2000 (H)   Sed Rate Latest Ref Range: 0 - 20 mm/Hr  7        Results for DTCHANTEL (MRN [id number]) as of 8/19/2020 15:54   Ref. Range 6/30/2020 17:54 8/13/2020 15:19   Sodium Latest Ref Range: 136 - 145 mmol/L 139 134 (L)   Potassium Latest Ref Range: 3.5 - 5.1 mmol/L 3.7 4.1   Chloride Latest Ref Range: 95 - 110 mmol/L 107 104   CO2 Latest Ref Range: 23 - 29 mmol/L 23 21 (L)   Anion Gap Latest Ref Range: 8 - 16 mmol/L 9 9   BUN, Bld Latest Ref Range: 6 - 20 mg/dL 7 14   Creatinine Latest Ref Range: 0.5 - 1.4 mg/dL 0.7 0.7   eGFR if non African American Latest Ref Range: >60 mL/min/1.73 m^2 >60 >60.0   eGFR if African American Latest Ref Range: >60 mL/min/1.73 m^2 >60 >60.0   Glucose Latest Ref Range: 70 - 110 mg/dL 113 (H) 98   Calcium Latest Ref Range: 8.7 - 10.5 mg/dL 9.0 9.2   Alkaline Phosphatase Latest Ref Range: 55 - 135 U/L 61 68   PROTEIN TOTAL Latest Ref Range: 6.0 - 8.4 g/dL 6.9 7.0   Protein, Serum Latest  Ref Range: 6.0 - 8.4 g/dL 6.6    Albumin Latest Ref Range: 3.5 - 5.2 g/dL 4.0 4.2   BILIRUBIN TOTAL Latest Ref Range: 0.1 - 1.0 mg/dL 0.2 0.2   AST Latest Ref Range: 10 - 40 U/L 29 28   ALT Latest Ref Range: 10 - 44 U/L 47 (H) 39   CRP Latest Ref Range: 0.0 - 8.2 mg/L 0.4    Retinol, serum Latest Ref Range: 38 - 106 ug/dL  93   Vit D, 25-Hydroxy Latest Ref Range: 30 - 96 ng/mL  50   Vitamin B-12 Latest Ref Range: 210 - 950 pg/mL  >2000 (H)   Vitamin B6 Latest Ref Range: 5 - 50 ug/L  19   TSH Latest Ref Range: 0.400 - 4.000 uIU/mL  0.774   T4 Total Latest Ref Range: 4.5 - 11.5 ug/dL  5.8         Assessment:       1. Other iron deficiency anemias    2. Other vitamin B12 deficiency anemias    3. S/P bariatric surgery    4. Fatigue, unspecified type        Plan:     1.      Last IV iron 10/21/19 and 10/28/19  2.      Hgb 13.5, plts elevated but stable- workup of elevated platelets negative for BRYANNA mutation,   2.      B12 level is elevated - Continue sublinguinal B12- couple of times a week. rtc in 3 mons with cbc, cmp,  iron, ferritin and tibc, will do further labs in 6 mons- B1, b6, vit d, selinium, vit A and vit K one week prior to visit with me for review- taking multivitamin bid  3.       Encouraged eating iron rich foods- call for any weakness or fatigue out of the ordinary.   4.       Normal liver enzymes- hx of elevation in the past- has history of  fatty liver disease followed by Dr. Roberts - has appt today- has had bm changes   Hep B and C labs at [facility] -11/2018 - negative   CT of abd and pelvis with contrast wnl 7/26/18   Hida scan normal 7/9/18   Ultrasound of gallbladder revealed fatty liver 6/22/18  Recommended sublinguinal b12/b vitamins for pt to take daily to see if can wean off b12 injections- pt agrees. Will call if has any changes in stamina. Continue taking multivitamin.

## 2020-08-20 LAB — PHYTONADIONE SERPL-MCNC: 0.7 NMOL/L (ref 0.22–4.88)

## 2020-08-24 ENCOUNTER — OFFICE VISIT (OUTPATIENT)
Dept: SURGERY | Facility: CLINIC | Age: 40
End: 2020-08-24
Payer: COMMERCIAL

## 2020-08-24 DIAGNOSIS — Z98.84 S/P BARIATRIC SURGERY: ICD-10-CM

## 2020-08-24 DIAGNOSIS — R53.83 FATIGUE, UNSPECIFIED TYPE: ICD-10-CM

## 2020-08-24 DIAGNOSIS — D50.8 OTHER IRON DEFICIENCY ANEMIAS: Primary | ICD-10-CM

## 2020-08-24 DIAGNOSIS — D51.8 OTHER VITAMIN B12 DEFICIENCY ANEMIAS: ICD-10-CM

## 2020-08-24 PROCEDURE — 99214 PR OFFICE/OUTPT VISIT, EST, LEVL IV, 30-39 MIN: ICD-10-PCS | Mod: 95,,, | Performed by: NURSE PRACTITIONER

## 2020-08-24 PROCEDURE — 99214 OFFICE O/P EST MOD 30 MIN: CPT | Mod: 95,,, | Performed by: NURSE PRACTITIONER

## 2020-09-04 ENCOUNTER — TELEPHONE (OUTPATIENT)
Dept: RHEUMATOLOGY | Facility: CLINIC | Age: 40
End: 2020-09-04

## 2020-09-08 ENCOUNTER — OFFICE VISIT (OUTPATIENT)
Dept: RHEUMATOLOGY | Facility: CLINIC | Age: 40
End: 2020-09-08
Payer: COMMERCIAL

## 2020-09-08 ENCOUNTER — TELEPHONE (OUTPATIENT)
Dept: RHEUMATOLOGY | Facility: CLINIC | Age: 40
End: 2020-09-08

## 2020-09-08 DIAGNOSIS — G93.32 CFS (CHRONIC FATIGUE SYNDROME): ICD-10-CM

## 2020-09-08 DIAGNOSIS — M79.7 FIBROMYALGIA: ICD-10-CM

## 2020-09-08 DIAGNOSIS — M35.09 SJOGREN'S SYNDROME WITH OTHER ORGAN INVOLVEMENT: Primary | ICD-10-CM

## 2020-09-08 PROCEDURE — 99214 OFFICE O/P EST MOD 30 MIN: CPT | Mod: 95,,, | Performed by: INTERNAL MEDICINE

## 2020-09-08 PROCEDURE — 99214 PR OFFICE/OUTPT VISIT, EST, LEVL IV, 30-39 MIN: ICD-10-PCS | Mod: 95,,, | Performed by: INTERNAL MEDICINE

## 2020-09-08 RX ORDER — AZATHIOPRINE 50 MG/1
50 TABLET ORAL DAILY
Qty: 30 TABLET | Refills: 1 | Status: SHIPPED | OUTPATIENT
Start: 2020-09-08 | End: 2020-10-08

## 2020-09-08 NOTE — Clinical Note
Please schedule CBC and CMP in 4 weeks since she is starting Imuran.  Schedule a follow-up visit with me in 3 months and CBC CMP before that visit.

## 2020-09-08 NOTE — PROGRESS NOTES
RHEUMATOLOGY FOLLOW UP - TELE VISIT     The patient location is: [place]  The chief complaint leading to consultation is:  Persistent dry mouth mild improvement in pain  Visit type: Virtual visit with synchronous audio and video  Total time spent with patient: 12 minutes  Each patient to whom he or she provides medical services by telemedicine is:  (1) informed of the relationship between the physician and patient and the respective role of any other health care provider with respect to management of the patient; and (2) notified that he or she may decline to receive medical services by telemedicine and may withdraw from such care at any time.    HPI:-  Shelby Mena a 40 y.o. pleasant female seen today through My chart video visit for follow up.  She complains of worsening fatigue even though her pain has reduced a little bit along with improvement in her sleep since taking higher dose of naltrexone.  Her dry mouth and dry eyes have certainly improved with pilocarpine.  No photosensitive malar rash, Raynaud's phenomenon.  No joint swelling.     Review of Systems   Constitutional: Positive for malaise/fatigue. Negative for chills and fever.   HENT: Negative for congestion and sore throat.    Eyes: Negative for blurred vision and redness.   Respiratory: Negative for cough and shortness of breath.    Cardiovascular: Negative for chest pain and leg swelling.   Gastrointestinal: Negative for abdominal pain.   Genitourinary: Negative for dysuria.   Musculoskeletal: Positive for back pain, joint pain, myalgias and neck pain. Negative for falls.   Skin: Negative for rash.   Neurological: Negative for headaches.   Endo/Heme/Allergies: Does not bruise/bleed easily.   Psychiatric/Behavioral: Positive for depression. Negative for memory loss. The patient is nervous/anxious and has insomnia.        Past Medical History:   Diagnosis Date    Anemia     Asthma     Childhood    Diabetes mellitus 2003    Fibromyalgia 2013     Heart disease     Hypertension        Past Surgical History:   Procedure Laterality Date    CERVICAL DISC ARTHROPLASTY  2015    2 aritificial discs placed    GASTRIC BYPASS  2015    Lab band reversal      LAPAROSCOPIC GASTRIC BANDING  2001    SINUS SURGERY  1996    SINUS SURGERY  2007    TONSILLECTOMY, ADENOIDECTOMY  1981        Social History     Tobacco Use    Smoking status: Former Smoker     Packs/day: 0.50     Years: 10.00     Pack years: 5.00     Types: Cigarettes     Quit date:      Years since quittin.6    Smokeless tobacco: Never Used   Substance Use Topics    Alcohol use: No    Drug use: No       Family History   Problem Relation Age of Onset    Cancer Mother     Hypertension Mother     Cancer Father     Asthma Paternal Uncle     Cancer Maternal Grandfather        Review of patient's allergies indicates:   Allergen Reactions    Amoxicillin-pot clavulanate     Codeine     Nsaids (non-steroidal anti-inflammatory drug)        Physical exam:-    GEN: awake, alert, non-diaphoretic, no psychomotor agitation, no acute distress    HEENT :Head: atraumatic, normocephalic, no rashes noted, no lesions noted;    Eyes: NO redness, discharge, swelling, or lesions    Nose: NO redness, swelling, discharge, deformity, or impetigo/crusting    Skin: no lesions, wounds, erythema, or cyanosis noted on face or hands    Cardiopulmonary: no increased respiratory effort, speaking in clear sentences    MSK: normal ROM in the neck, Upper extremities, Lower extremities  Good ROM of hands, fist formation 90% and , no obvious synovitis    Good ambulation in front of the camera    Neuro: cranial nerves grossly normal, speech normal rate and rhythm, orientation arrived to appointment on time with no prompting, moved both upper extremities equally    Pysch:  appearance, behavior, and attitude- well groomed, pleasant, cooperative    Medication List with Changes/Refills   New Medications     AZATHIOPRINE (IMURAN) 50 MG TAB    Take 1 tablet (50 mg total) by mouth once daily.   Current Medications    ALPRAZOLAM (XANAX) 0.25 MG TABLET    alprazolam 0.25 mg tablet   Take 1 by mouth daily    ASPIRIN (ECOTRIN) 81 MG EC TABLET    Take 81 mg by mouth once daily.     BUSPIRONE (BUSPAR) 7.5 MG TABLET    Take 7.5 mg by mouth 2 (two) times daily.    BUSPIRONE (BUSPAR) 7.5 MG TABLET    Take 7.5 mg by mouth.    CETIRIZINE (ZYRTEC) 10 MG TABLET    Take 10 mg by mouth daily as needed.     DICYCLOMINE (BENTYL) 20 MG TABLET    Take 20 mg by mouth as needed.     ESZOPICLONE (LUNESTA) 3 MG TAB    eszopiclone 3 mg tablet   Take 1 tablet by mouth at bedtime    FLUOXETINE (PROZAC) 20 MG CAPSULE    fluoxetine 20 mg capsule   TAKE THREE CAPSULES BY MOUTH DAILY    GABAPENTIN (NEURONTIN) 300 MG CAPSULE    Take 300 mg by mouth.    LAMOTRIGINE (LAMICTAL) 25 MG TABLET    Take 25 mg by mouth once daily.    LINACLOTIDE (LINZESS) 145 MCG CAP CAPSULE    Linzess 145 mcg capsule   TAKE ONE CAPSULE BY MOUTH 30 MINUTES PRIOR TO A MEAL    LISINOPRIL (PRINIVIL,ZESTRIL) 20 MG TABLET    Take 20 mg by mouth once daily.    MELOXICAM (MOBIC) 15 MG TABLET    Take 15 mg by mouth once daily.    METFORMIN (GLUCOPHAGE) 1000 MG TABLET    metformin 1,000 mg tablet   TAKE ONE TABLET BY MOUTH TWICE DAILY]    METOPROLOL SUCCINATE (TOPROL-XL) 25 MG 24 HR TABLET    Take 25 mg by mouth.    MONTELUKAST (SINGULAIR) 10 MG TABLET    Take 10 mg by mouth once daily.     NALTREXONE CAPSULE    Take 6 mg once at bedtime daily.    ONDANSETRON (ZOFRAN) 8 MG TABLET    Take 8 mg by mouth every 8 (eight) hours as needed.    PANTOPRAZOLE (PROTONIX) 40 MG TABLET    Take 40 mg by mouth 3 (three) times daily.    PILOCARPINE (SALAGEN) 7.5 MG TABLET    Take 1 tablet (7.5 mg total) by mouth before meals as needed.    PROAIR HFA 90 MCG/ACTUATION INHALER    INHALE TWO PUFFS UP TO FOUR TIMES DAILY IF NEEDED FOR QUICK RELIEF ONLY    ROSUVASTATIN (CRESTOR) 10 MG TABLET    Take 10 mg by  mouth once daily.    SITAGLIPTIN (JANUVIA) 100 MG TAB    Januvia 100 mg tablet   TAKE ONE TABLET BY MOUTH DAILY    SUCRALFATE (CARAFATE) 1 GRAM TABLET    Take 1 g by mouth 3 (three) times daily.    SUMATRIPTAN (IMITREX) 100 MG TABLET    Take 1 tablet at onset of migraine.  May repeat 1 tablet in 2 hr if needed.  Limited to 3 tablets a week.    TOPIRAMATE (TOPAMAX) 15 MG CAPSULE    Take 1 capsule once a day for 7 days, then 2 capsules once a day for 7 days then switch to 50 mg tablet    TOPIRAMATE (TOPAMAX) 50 MG TABLET    Take 1 tablet (50 mg total) by mouth once daily.   Discontinued Medications    CYANOCOBALAMIN (VITAMIN B-12) 500 MCG TABLET    Take 500 mcg by mouth once daily.    CYANOCOBALAMIN 1,000 MCG/ML INJECTION    Inject 1,000 mcg into the muscle.    FLUTICASONE (FLONASE) 50 MCG/ACTUATION NASAL SPRAY    fluticasone propionate 50 mcg/actuation nasal spray,suspension    HYDROXYCHLOROQUINE (PLAQUENIL) 200 MG TABLET    Take 1 tablet (200 mg total) by mouth 2 (two) times daily.    IBUPROFEN (ADVIL,MOTRIN) 600 MG TABLET    Take 600 mg by mouth 4 (four) times daily.    METOPROLOL SUCCINATE (TOPROL-XL) 25 MG 24 HR TABLET    Take 25 mg by mouth nightly.    NAPROXEN (NAPROSYN) 500 MG TABLET    Take 500 mg by mouth 2 (two) times daily.    SUCRALFATE (CARAFATE) 1 GRAM TABLET    Take 1 g by mouth.       Assessment/Plans:-  1. Sjogren's syndrome with other organ involvement    2. Fibromyalgia    3. CFS (chronic fatigue syndrome)      · Seronegative severe sicca syndrome not responding to Plaquenil with worsening fatigue.  Discontinue Plaquenil and try Imuran.  · Try immunosuppressive therapy for possible underlying autoimmune related small fiber neuropathy contributing to her chronic fatigue syndrome.    · Continue low-dose naltrexone and pilocarpine.  · Check safety labs for Imuran in 4 weeks and before next visit in 3 months.  Advised all adverse effects of the medication including immunosuppressive related infection  risk.  ·   Problem List Items Addressed This Visit     Sjogren's disease - Primary    Relevant Medications    azaTHIOprine (IMURAN) 50 mg Tab    Fibromyalgia    CFS (chronic fatigue syndrome)    Relevant Medications    azaTHIOprine (IMURAN) 50 mg Tab          No follow-ups on file.    Disclaimer: This note was prepared using voice recognition system and is likely to have sound alike errors and is not proof read.  Please call me with any questions.

## 2020-09-08 NOTE — TELEPHONE ENCOUNTER
----- Message from Wesly Morales MD sent at 9/8/2020  3:44 PM CDT -----  Please schedule CBC and CMP in 4 weeks since she is starting Imuran.  Schedule a follow-up visit with me in 3 months and CBC CMP before that visit.

## 2020-09-17 NOTE — TELEPHONE ENCOUNTER
Spoke with pt and scheduled 4 wk labs on 10.7.20 3 mth labs on 12.11.20 and 3 mth apt with Dr. SHARPE for 12.15.20 at 3.45 pm, Pt verbalized understanding

## 2020-10-07 ENCOUNTER — LAB VISIT (OUTPATIENT)
Dept: LAB | Facility: HOSPITAL | Age: 40
End: 2020-10-07
Attending: INTERNAL MEDICINE
Payer: COMMERCIAL

## 2020-10-07 DIAGNOSIS — G93.32 CFS (CHRONIC FATIGUE SYNDROME): ICD-10-CM

## 2020-10-07 DIAGNOSIS — M79.7 FIBROMYALGIA: ICD-10-CM

## 2020-10-07 DIAGNOSIS — M35.09 SJOGREN'S SYNDROME WITH OTHER ORGAN INVOLVEMENT: ICD-10-CM

## 2020-10-07 LAB
ALBUMIN SERPL BCP-MCNC: 3.6 G/DL (ref 3.5–5.2)
ALP SERPL-CCNC: 79 U/L (ref 55–135)
ALT SERPL W/O P-5'-P-CCNC: 51 U/L (ref 10–44)
ANION GAP SERPL CALC-SCNC: 8 MMOL/L (ref 8–16)
AST SERPL-CCNC: 37 U/L (ref 10–40)
BASOPHILS # BLD AUTO: 0.04 K/UL (ref 0–0.2)
BASOPHILS NFR BLD: 0.6 % (ref 0–1.9)
BILIRUB SERPL-MCNC: 0.1 MG/DL (ref 0.1–1)
BUN SERPL-MCNC: 14 MG/DL (ref 6–20)
CALCIUM SERPL-MCNC: 9 MG/DL (ref 8.7–10.5)
CHLORIDE SERPL-SCNC: 109 MMOL/L (ref 95–110)
CO2 SERPL-SCNC: 23 MMOL/L (ref 23–29)
CREAT SERPL-MCNC: 0.7 MG/DL (ref 0.5–1.4)
DIFFERENTIAL METHOD: ABNORMAL
EOSINOPHIL # BLD AUTO: 0.1 K/UL (ref 0–0.5)
EOSINOPHIL NFR BLD: 2.1 % (ref 0–8)
ERYTHROCYTE [DISTWIDTH] IN BLOOD BY AUTOMATED COUNT: 12.2 % (ref 11.5–14.5)
EST. GFR  (AFRICAN AMERICAN): >60 ML/MIN/1.73 M^2
EST. GFR  (NON AFRICAN AMERICAN): >60 ML/MIN/1.73 M^2
GLUCOSE SERPL-MCNC: 199 MG/DL (ref 70–110)
HCT VFR BLD AUTO: 41.1 % (ref 37–48.5)
HGB BLD-MCNC: 12.6 G/DL (ref 12–16)
IMM GRANULOCYTES # BLD AUTO: 0.02 K/UL (ref 0–0.04)
IMM GRANULOCYTES NFR BLD AUTO: 0.3 % (ref 0–0.5)
LYMPHOCYTES # BLD AUTO: 2.2 K/UL (ref 1–4.8)
LYMPHOCYTES NFR BLD: 34.3 % (ref 18–48)
MCH RBC QN AUTO: 32 PG (ref 27–31)
MCHC RBC AUTO-ENTMCNC: 30.7 G/DL (ref 32–36)
MCV RBC AUTO: 104 FL (ref 82–98)
MONOCYTES # BLD AUTO: 0.4 K/UL (ref 0.3–1)
MONOCYTES NFR BLD: 7 % (ref 4–15)
NEUTROPHILS # BLD AUTO: 3.5 K/UL (ref 1.8–7.7)
NEUTROPHILS NFR BLD: 55.7 % (ref 38–73)
NRBC BLD-RTO: 0 /100 WBC
PLATELET # BLD AUTO: 350 K/UL (ref 150–350)
PMV BLD AUTO: 10.2 FL (ref 9.2–12.9)
POTASSIUM SERPL-SCNC: 4.1 MMOL/L (ref 3.5–5.1)
PROT SERPL-MCNC: 6.3 G/DL (ref 6–8.4)
RBC # BLD AUTO: 3.94 M/UL (ref 4–5.4)
SODIUM SERPL-SCNC: 140 MMOL/L (ref 136–145)
WBC # BLD AUTO: 6.26 K/UL (ref 3.9–12.7)

## 2020-10-07 PROCEDURE — 85025 COMPLETE CBC W/AUTO DIFF WBC: CPT

## 2020-10-07 PROCEDURE — 80053 COMPREHEN METABOLIC PANEL: CPT

## 2020-10-07 PROCEDURE — 36415 COLL VENOUS BLD VENIPUNCTURE: CPT | Mod: PO

## 2020-11-25 ENCOUNTER — LAB VISIT (OUTPATIENT)
Dept: LAB | Facility: HOSPITAL | Age: 40
End: 2020-11-25
Attending: NURSE PRACTITIONER
Payer: COMMERCIAL

## 2020-11-25 DIAGNOSIS — D51.8 OTHER VITAMIN B12 DEFICIENCY ANEMIAS: ICD-10-CM

## 2020-11-25 DIAGNOSIS — D50.8 OTHER IRON DEFICIENCY ANEMIAS: ICD-10-CM

## 2020-11-25 DIAGNOSIS — Z98.84 S/P BARIATRIC SURGERY: ICD-10-CM

## 2020-11-25 DIAGNOSIS — R53.83 FATIGUE, UNSPECIFIED TYPE: ICD-10-CM

## 2020-11-25 LAB
ALBUMIN SERPL BCP-MCNC: 4.1 G/DL (ref 3.5–5.2)
ALP SERPL-CCNC: 84 U/L (ref 55–135)
ALT SERPL W/O P-5'-P-CCNC: 54 U/L (ref 10–44)
ANION GAP SERPL CALC-SCNC: 9 MMOL/L (ref 8–16)
AST SERPL-CCNC: 48 U/L (ref 10–40)
BASOPHILS # BLD AUTO: 0.04 K/UL (ref 0–0.2)
BASOPHILS NFR BLD: 0.7 % (ref 0–1.9)
BILIRUB SERPL-MCNC: 0.3 MG/DL (ref 0.1–1)
BUN SERPL-MCNC: 10 MG/DL (ref 6–20)
CALCIUM SERPL-MCNC: 9.4 MG/DL (ref 8.7–10.5)
CHLORIDE SERPL-SCNC: 106 MMOL/L (ref 95–110)
CO2 SERPL-SCNC: 25 MMOL/L (ref 23–29)
CREAT SERPL-MCNC: 0.8 MG/DL (ref 0.5–1.4)
DIFFERENTIAL METHOD: ABNORMAL
EOSINOPHIL # BLD AUTO: 0.1 K/UL (ref 0–0.5)
EOSINOPHIL NFR BLD: 0.8 % (ref 0–8)
ERYTHROCYTE [DISTWIDTH] IN BLOOD BY AUTOMATED COUNT: 11.9 % (ref 11.5–14.5)
EST. GFR  (AFRICAN AMERICAN): >60 ML/MIN/1.73 M^2
EST. GFR  (NON AFRICAN AMERICAN): >60 ML/MIN/1.73 M^2
FERRITIN SERPL-MCNC: 203 NG/ML (ref 20–300)
GLUCOSE SERPL-MCNC: 114 MG/DL (ref 70–110)
HCT VFR BLD AUTO: 43.8 % (ref 37–48.5)
HGB BLD-MCNC: 13.5 G/DL (ref 12–16)
IMM GRANULOCYTES # BLD AUTO: 0.02 K/UL (ref 0–0.04)
IMM GRANULOCYTES NFR BLD AUTO: 0.3 % (ref 0–0.5)
IRON SERPL-MCNC: 147 UG/DL (ref 30–160)
LYMPHOCYTES # BLD AUTO: 2 K/UL (ref 1–4.8)
LYMPHOCYTES NFR BLD: 33 % (ref 18–48)
MCH RBC QN AUTO: 30.2 PG (ref 27–31)
MCHC RBC AUTO-ENTMCNC: 30.8 G/DL (ref 32–36)
MCV RBC AUTO: 98 FL (ref 82–98)
MONOCYTES # BLD AUTO: 0.5 K/UL (ref 0.3–1)
MONOCYTES NFR BLD: 7.4 % (ref 4–15)
NEUTROPHILS # BLD AUTO: 3.5 K/UL (ref 1.8–7.7)
NEUTROPHILS NFR BLD: 57.8 % (ref 38–73)
NRBC BLD-RTO: 0 /100 WBC
PLATELET # BLD AUTO: 381 K/UL (ref 150–350)
PMV BLD AUTO: 10.1 FL (ref 9.2–12.9)
POTASSIUM SERPL-SCNC: 4.1 MMOL/L (ref 3.5–5.1)
PROT SERPL-MCNC: 7.5 G/DL (ref 6–8.4)
RBC # BLD AUTO: 4.47 M/UL (ref 4–5.4)
SATURATED IRON: 34 % (ref 20–50)
SODIUM SERPL-SCNC: 140 MMOL/L (ref 136–145)
TOTAL IRON BINDING CAPACITY: 437 UG/DL (ref 250–450)
TRANSFERRIN SERPL-MCNC: 295 MG/DL (ref 200–375)
WBC # BLD AUTO: 6.12 K/UL (ref 3.9–12.7)

## 2020-11-25 PROCEDURE — 82728 ASSAY OF FERRITIN: CPT

## 2020-11-25 PROCEDURE — 36415 COLL VENOUS BLD VENIPUNCTURE: CPT | Mod: PO

## 2020-11-25 PROCEDURE — 80053 COMPREHEN METABOLIC PANEL: CPT

## 2020-11-25 PROCEDURE — 85025 COMPLETE CBC W/AUTO DIFF WBC: CPT

## 2020-11-25 PROCEDURE — 83540 ASSAY OF IRON: CPT

## 2020-12-11 ENCOUNTER — LAB VISIT (OUTPATIENT)
Dept: LAB | Facility: HOSPITAL | Age: 40
End: 2020-12-11
Attending: INTERNAL MEDICINE
Payer: COMMERCIAL

## 2020-12-11 DIAGNOSIS — M35.09 SJOGREN'S SYNDROME WITH OTHER ORGAN INVOLVEMENT: ICD-10-CM

## 2020-12-11 DIAGNOSIS — G93.32 CFS (CHRONIC FATIGUE SYNDROME): ICD-10-CM

## 2020-12-11 DIAGNOSIS — M79.7 FIBROMYALGIA: ICD-10-CM

## 2020-12-11 LAB
ALBUMIN SERPL BCP-MCNC: 3.6 G/DL (ref 3.5–5.2)
ALP SERPL-CCNC: 82 U/L (ref 55–135)
ALT SERPL W/O P-5'-P-CCNC: 25 U/L (ref 10–44)
ANION GAP SERPL CALC-SCNC: 11 MMOL/L (ref 8–16)
AST SERPL-CCNC: 18 U/L (ref 10–40)
BASOPHILS # BLD AUTO: 0.04 K/UL (ref 0–0.2)
BASOPHILS NFR BLD: 0.6 % (ref 0–1.9)
BILIRUB SERPL-MCNC: 0.2 MG/DL (ref 0.1–1)
BUN SERPL-MCNC: 11 MG/DL (ref 6–20)
CALCIUM SERPL-MCNC: 8.7 MG/DL (ref 8.7–10.5)
CHLORIDE SERPL-SCNC: 106 MMOL/L (ref 95–110)
CO2 SERPL-SCNC: 21 MMOL/L (ref 23–29)
CREAT SERPL-MCNC: 0.8 MG/DL (ref 0.5–1.4)
DIFFERENTIAL METHOD: ABNORMAL
EOSINOPHIL # BLD AUTO: 0.1 K/UL (ref 0–0.5)
EOSINOPHIL NFR BLD: 1.9 % (ref 0–8)
ERYTHROCYTE [DISTWIDTH] IN BLOOD BY AUTOMATED COUNT: 12.5 % (ref 11.5–14.5)
EST. GFR  (AFRICAN AMERICAN): >60 ML/MIN/1.73 M^2
EST. GFR  (NON AFRICAN AMERICAN): >60 ML/MIN/1.73 M^2
GLUCOSE SERPL-MCNC: 160 MG/DL (ref 70–110)
HCT VFR BLD AUTO: 39.8 % (ref 37–48.5)
HGB BLD-MCNC: 12.4 G/DL (ref 12–16)
IMM GRANULOCYTES # BLD AUTO: 0.03 K/UL (ref 0–0.04)
IMM GRANULOCYTES NFR BLD AUTO: 0.4 % (ref 0–0.5)
LYMPHOCYTES # BLD AUTO: 2.2 K/UL (ref 1–4.8)
LYMPHOCYTES NFR BLD: 31.7 % (ref 18–48)
MCH RBC QN AUTO: 31.1 PG (ref 27–31)
MCHC RBC AUTO-ENTMCNC: 31.2 G/DL (ref 32–36)
MCV RBC AUTO: 100 FL (ref 82–98)
MONOCYTES # BLD AUTO: 0.6 K/UL (ref 0.3–1)
MONOCYTES NFR BLD: 8.2 % (ref 4–15)
NEUTROPHILS # BLD AUTO: 3.9 K/UL (ref 1.8–7.7)
NEUTROPHILS NFR BLD: 57.2 % (ref 38–73)
NRBC BLD-RTO: 0 /100 WBC
PLATELET # BLD AUTO: 316 K/UL (ref 150–350)
PMV BLD AUTO: 10 FL (ref 9.2–12.9)
POTASSIUM SERPL-SCNC: 3.8 MMOL/L (ref 3.5–5.1)
PROT SERPL-MCNC: 6.7 G/DL (ref 6–8.4)
RBC # BLD AUTO: 3.99 M/UL (ref 4–5.4)
SODIUM SERPL-SCNC: 138 MMOL/L (ref 136–145)
WBC # BLD AUTO: 6.82 K/UL (ref 3.9–12.7)

## 2020-12-11 PROCEDURE — 85025 COMPLETE CBC W/AUTO DIFF WBC: CPT

## 2020-12-11 PROCEDURE — 36415 COLL VENOUS BLD VENIPUNCTURE: CPT | Mod: PO

## 2020-12-11 PROCEDURE — 80053 COMPREHEN METABOLIC PANEL: CPT

## 2020-12-14 ENCOUNTER — TELEPHONE (OUTPATIENT)
Dept: RHEUMATOLOGY | Facility: CLINIC | Age: 40
End: 2020-12-14

## 2020-12-15 ENCOUNTER — TELEPHONE (OUTPATIENT)
Dept: RHEUMATOLOGY | Facility: CLINIC | Age: 40
End: 2020-12-15

## 2020-12-15 DIAGNOSIS — M35.09 SJOGREN'S SYNDROME WITH OTHER ORGAN INVOLVEMENT: ICD-10-CM

## 2020-12-15 DIAGNOSIS — G93.32 CFS (CHRONIC FATIGUE SYNDROME): ICD-10-CM

## 2020-12-15 RX ORDER — AZATHIOPRINE 50 MG/1
50 TABLET ORAL DAILY
Qty: 30 TABLET | Refills: 2 | Status: SHIPPED | OUTPATIENT
Start: 2020-12-15 | End: 2021-02-17

## 2020-12-15 NOTE — TELEPHONE ENCOUNTER
Called patient regarding 3.45 pm appointment today. 12.15.20 with Dr. SHARPE due to Dr. SHARPE being out of the clinic. Offered virtual visit for today at the same time and pt declined since the last few visits were virtual and she would prefer to come in to see him in person. Scheduled next opening at [facility] on 2.17.21 at 2.45 pm, (offered sooner appointment in [facility] on 1.25.21 and pt declined that location). Would like to be seen sooner if possible and to know if her medications will be refilled until she is seen again. Please Advise.

## 2020-12-18 ENCOUNTER — PATIENT MESSAGE (OUTPATIENT)
Dept: RHEUMATOLOGY | Facility: CLINIC | Age: 40
End: 2020-12-18

## 2020-12-18 DIAGNOSIS — G93.32 CFS (CHRONIC FATIGUE SYNDROME): ICD-10-CM

## 2020-12-18 DIAGNOSIS — M79.7 FIBROMYALGIA: ICD-10-CM

## 2020-12-18 DIAGNOSIS — M35.09 SJOGREN'S SYNDROME WITH OTHER ORGAN INVOLVEMENT: Primary | ICD-10-CM

## 2020-12-18 RX ORDER — TIZANIDINE 4 MG/1
4 TABLET ORAL NIGHTLY
Qty: 90 TABLET | Refills: 3 | Status: SHIPPED | OUTPATIENT
Start: 2020-12-18 | End: 2021-02-17

## 2020-12-18 RX ORDER — GABAPENTIN 300 MG/1
300 CAPSULE ORAL NIGHTLY
Qty: 90 CAPSULE | Refills: 3 | Status: SHIPPED | OUTPATIENT
Start: 2020-12-18 | End: 2021-02-17 | Stop reason: SDUPTHER

## 2021-02-17 ENCOUNTER — OFFICE VISIT (OUTPATIENT)
Dept: RHEUMATOLOGY | Facility: CLINIC | Age: 41
End: 2021-02-17
Payer: COMMERCIAL

## 2021-02-17 ENCOUNTER — TELEPHONE (OUTPATIENT)
Dept: RHEUMATOLOGY | Facility: CLINIC | Age: 41
End: 2021-02-17

## 2021-02-17 ENCOUNTER — DOCUMENTATION ONLY (OUTPATIENT)
Dept: RHEUMATOLOGY | Facility: CLINIC | Age: 41
End: 2021-02-17

## 2021-02-17 VITALS
HEIGHT: 65 IN | SYSTOLIC BLOOD PRESSURE: 109 MMHG | DIASTOLIC BLOOD PRESSURE: 74 MMHG | WEIGHT: 224.88 LBS | BODY MASS INDEX: 37.47 KG/M2 | HEART RATE: 93 BPM

## 2021-02-17 DIAGNOSIS — M79.7 FIBROMYALGIA: ICD-10-CM

## 2021-02-17 DIAGNOSIS — G93.32 CFS (CHRONIC FATIGUE SYNDROME): ICD-10-CM

## 2021-02-17 DIAGNOSIS — M35.09 SJOGREN'S SYNDROME WITH OTHER ORGAN INVOLVEMENT: Primary | ICD-10-CM

## 2021-02-17 PROCEDURE — 1125F AMNT PAIN NOTED PAIN PRSNT: CPT | Mod: S$GLB,,, | Performed by: INTERNAL MEDICINE

## 2021-02-17 PROCEDURE — 99999 PR PBB SHADOW E&M-EST. PATIENT-LVL IV: CPT | Mod: PBBFAC,,, | Performed by: INTERNAL MEDICINE

## 2021-02-17 PROCEDURE — 99215 PR OFFICE/OUTPT VISIT, EST, LEVL V, 40-54 MIN: ICD-10-PCS | Mod: S$GLB,,, | Performed by: INTERNAL MEDICINE

## 2021-02-17 PROCEDURE — 3008F PR BODY MASS INDEX (BMI) DOCUMENTED: ICD-10-PCS | Mod: CPTII,S$GLB,, | Performed by: INTERNAL MEDICINE

## 2021-02-17 PROCEDURE — 3008F BODY MASS INDEX DOCD: CPT | Mod: CPTII,S$GLB,, | Performed by: INTERNAL MEDICINE

## 2021-02-17 PROCEDURE — 99999 PR PBB SHADOW E&M-EST. PATIENT-LVL IV: ICD-10-PCS | Mod: PBBFAC,,, | Performed by: INTERNAL MEDICINE

## 2021-02-17 PROCEDURE — 1125F PR PAIN SEVERITY QUANTIFIED, PAIN PRESENT: ICD-10-PCS | Mod: S$GLB,,, | Performed by: INTERNAL MEDICINE

## 2021-02-17 PROCEDURE — 99215 OFFICE O/P EST HI 40 MIN: CPT | Mod: S$GLB,,, | Performed by: INTERNAL MEDICINE

## 2021-02-17 RX ORDER — TIZANIDINE 4 MG/1
4 TABLET ORAL EVERY 8 HOURS PRN
Qty: 90 TABLET | Refills: 11 | Status: SHIPPED | OUTPATIENT
Start: 2021-02-17 | End: 2022-01-18

## 2021-02-17 RX ORDER — VILAZODONE HYDROCHLORIDE 40 MG/1
40 TABLET ORAL DAILY
COMMUNITY
Start: 2021-01-22

## 2021-02-17 RX ORDER — FLUTICASONE FUROATE, UMECLIDINIUM BROMIDE AND VILANTEROL TRIFENATATE 100; 62.5; 25 UG/1; UG/1; UG/1
POWDER RESPIRATORY (INHALATION)
COMMUNITY
Start: 2021-01-22 | End: 2022-01-03 | Stop reason: SDUPTHER

## 2021-02-17 RX ORDER — GABAPENTIN 300 MG/1
300 CAPSULE ORAL NIGHTLY
Qty: 90 CAPSULE | Refills: 3 | Status: SHIPPED | OUTPATIENT
Start: 2021-02-17 | End: 2021-02-17

## 2021-02-17 RX ORDER — EPINEPHRINE 0.3 MG/.3ML
INJECTION SUBCUTANEOUS
COMMUNITY
Start: 2020-12-29

## 2021-02-17 RX ORDER — PREGABALIN 75 MG/1
75 CAPSULE ORAL 2 TIMES DAILY
Qty: 60 CAPSULE | Refills: 11 | Status: SHIPPED | OUTPATIENT
Start: 2021-02-17 | End: 2021-07-20

## 2021-02-18 ENCOUNTER — OFFICE VISIT (OUTPATIENT)
Dept: NEUROLOGY | Facility: CLINIC | Age: 41
End: 2021-02-18
Payer: COMMERCIAL

## 2021-02-18 ENCOUNTER — TELEPHONE (OUTPATIENT)
Dept: RHEUMATOLOGY | Facility: CLINIC | Age: 41
End: 2021-02-18

## 2021-02-18 DIAGNOSIS — D51.8 OTHER VITAMIN B12 DEFICIENCY ANEMIAS: ICD-10-CM

## 2021-02-18 DIAGNOSIS — G43.009 MIGRAINE WITHOUT AURA AND WITHOUT STATUS MIGRAINOSUS, NOT INTRACTABLE: Primary | ICD-10-CM

## 2021-02-18 DIAGNOSIS — D50.8 OTHER IRON DEFICIENCY ANEMIAS: ICD-10-CM

## 2021-02-18 DIAGNOSIS — M79.7 FIBROMYALGIA: ICD-10-CM

## 2021-02-18 DIAGNOSIS — M35.09 SJOGREN'S SYNDROME WITH OTHER ORGAN INVOLVEMENT: ICD-10-CM

## 2021-02-18 DIAGNOSIS — Z98.84 S/P BARIATRIC SURGERY: ICD-10-CM

## 2021-02-18 DIAGNOSIS — G93.32 CFS (CHRONIC FATIGUE SYNDROME): ICD-10-CM

## 2021-02-18 PROCEDURE — 99215 PR OFFICE/OUTPT VISIT, EST, LEVL V, 40-54 MIN: ICD-10-PCS | Mod: 95,,, | Performed by: NURSE PRACTITIONER

## 2021-02-18 PROCEDURE — 99215 OFFICE O/P EST HI 40 MIN: CPT | Mod: 95,,, | Performed by: NURSE PRACTITIONER

## 2021-02-18 RX ORDER — TOPIRAMATE 50 MG/1
50 TABLET, FILM COATED ORAL 2 TIMES DAILY
Qty: 30 TABLET | Refills: 3 | Status: SHIPPED | OUTPATIENT
Start: 2021-02-18 | End: 2021-03-22

## 2021-02-18 RX ORDER — ELETRIPTAN HYDROBROMIDE 40 MG/1
40 TABLET, FILM COATED ORAL
Qty: 12 TABLET | Refills: 0 | Status: SHIPPED | OUTPATIENT
Start: 2021-02-18 | End: 2021-04-15

## 2021-03-04 ENCOUNTER — LAB VISIT (OUTPATIENT)
Dept: LAB | Facility: HOSPITAL | Age: 41
End: 2021-03-04
Attending: ORTHOPAEDIC SURGERY
Payer: COMMERCIAL

## 2021-03-04 DIAGNOSIS — Z01.812 PRE-OPERATIVE LABORATORY EXAMINATION: Primary | ICD-10-CM

## 2021-03-04 DIAGNOSIS — Z79.01 LONG TERM (CURRENT) USE OF ANTICOAGULANTS: ICD-10-CM

## 2021-03-04 LAB
APTT BLDCRRT: 27.3 SEC (ref 21–32)
BASOPHILS # BLD AUTO: 0.05 K/UL (ref 0–0.2)
BASOPHILS NFR BLD: 0.6 % (ref 0–1.9)
DIFFERENTIAL METHOD: ABNORMAL
EOSINOPHIL # BLD AUTO: 0.1 K/UL (ref 0–0.5)
EOSINOPHIL NFR BLD: 1 % (ref 0–8)
ERYTHROCYTE [DISTWIDTH] IN BLOOD BY AUTOMATED COUNT: 13.2 % (ref 11.5–14.5)
HCT VFR BLD AUTO: 41.2 % (ref 37–48.5)
HGB BLD-MCNC: 13.2 G/DL (ref 12–16)
IMM GRANULOCYTES # BLD AUTO: 0.03 K/UL (ref 0–0.04)
IMM GRANULOCYTES NFR BLD AUTO: 0.4 % (ref 0–0.5)
INR PPP: 0.9 (ref 0.8–1.2)
LYMPHOCYTES # BLD AUTO: 3.5 K/UL (ref 1–4.8)
LYMPHOCYTES NFR BLD: 42.2 % (ref 18–48)
MCH RBC QN AUTO: 31.7 PG (ref 27–31)
MCHC RBC AUTO-ENTMCNC: 32 G/DL (ref 32–36)
MCV RBC AUTO: 99 FL (ref 82–98)
MONOCYTES # BLD AUTO: 0.5 K/UL (ref 0.3–1)
MONOCYTES NFR BLD: 5.9 % (ref 4–15)
NEUTROPHILS # BLD AUTO: 4.1 K/UL (ref 1.8–7.7)
NEUTROPHILS NFR BLD: 49.9 % (ref 38–73)
NRBC BLD-RTO: 0 /100 WBC
PLATELET # BLD AUTO: 405 K/UL (ref 150–350)
PMV BLD AUTO: 9.8 FL (ref 9.2–12.9)
PROTHROMBIN TIME: 10.1 SEC (ref 9–12.5)
RBC # BLD AUTO: 4.17 M/UL (ref 4–5.4)
WBC # BLD AUTO: 8.27 K/UL (ref 3.9–12.7)

## 2021-03-04 PROCEDURE — 36415 COLL VENOUS BLD VENIPUNCTURE: CPT | Mod: PO | Performed by: ORTHOPAEDIC SURGERY

## 2021-03-04 PROCEDURE — 85730 THROMBOPLASTIN TIME PARTIAL: CPT | Performed by: ORTHOPAEDIC SURGERY

## 2021-03-04 PROCEDURE — 85610 PROTHROMBIN TIME: CPT | Performed by: ORTHOPAEDIC SURGERY

## 2021-03-04 PROCEDURE — 85025 COMPLETE CBC W/AUTO DIFF WBC: CPT | Performed by: ORTHOPAEDIC SURGERY

## 2021-04-15 ENCOUNTER — OFFICE VISIT (OUTPATIENT)
Dept: NEUROLOGY | Facility: CLINIC | Age: 41
End: 2021-04-15
Payer: COMMERCIAL

## 2021-04-15 DIAGNOSIS — G43.009 MIGRAINE WITHOUT AURA AND WITHOUT STATUS MIGRAINOSUS, NOT INTRACTABLE: Primary | ICD-10-CM

## 2021-04-15 DIAGNOSIS — D50.8 OTHER IRON DEFICIENCY ANEMIAS: ICD-10-CM

## 2021-04-15 DIAGNOSIS — M79.7 FIBROMYALGIA: ICD-10-CM

## 2021-04-15 DIAGNOSIS — M35.09 SJOGREN'S SYNDROME WITH OTHER ORGAN INVOLVEMENT: ICD-10-CM

## 2021-04-15 DIAGNOSIS — D51.8 OTHER VITAMIN B12 DEFICIENCY ANEMIAS: ICD-10-CM

## 2021-04-15 DIAGNOSIS — G93.32 CFS (CHRONIC FATIGUE SYNDROME): ICD-10-CM

## 2021-04-15 DIAGNOSIS — Z98.84 S/P BARIATRIC SURGERY: ICD-10-CM

## 2021-04-15 PROCEDURE — 99214 PR OFFICE/OUTPT VISIT, EST, LEVL IV, 30-39 MIN: ICD-10-PCS | Mod: 95,,, | Performed by: NURSE PRACTITIONER

## 2021-04-15 PROCEDURE — 99214 OFFICE O/P EST MOD 30 MIN: CPT | Mod: 95,,, | Performed by: NURSE PRACTITIONER

## 2021-04-15 RX ORDER — TOPIRAMATE 100 MG/1
100 TABLET, FILM COATED ORAL 2 TIMES DAILY
Qty: 60 TABLET | Refills: 11 | Status: SHIPPED | OUTPATIENT
Start: 2021-04-15 | End: 2021-11-22

## 2021-04-28 ENCOUNTER — PATIENT MESSAGE (OUTPATIENT)
Dept: SURGERY | Facility: CLINIC | Age: 41
End: 2021-04-28

## 2021-04-28 DIAGNOSIS — D50.8 OTHER IRON DEFICIENCY ANEMIAS: ICD-10-CM

## 2021-04-28 DIAGNOSIS — Z98.84 S/P BARIATRIC SURGERY: Primary | ICD-10-CM

## 2021-04-28 DIAGNOSIS — D51.8 OTHER VITAMIN B12 DEFICIENCY ANEMIAS: ICD-10-CM

## 2021-04-28 DIAGNOSIS — R53.83 FATIGUE, UNSPECIFIED TYPE: ICD-10-CM

## 2021-04-29 ENCOUNTER — LAB VISIT (OUTPATIENT)
Dept: LAB | Facility: HOSPITAL | Age: 41
End: 2021-04-29
Attending: NURSE PRACTITIONER
Payer: COMMERCIAL

## 2021-04-29 DIAGNOSIS — D50.8 OTHER IRON DEFICIENCY ANEMIAS: ICD-10-CM

## 2021-04-29 DIAGNOSIS — Z98.84 S/P BARIATRIC SURGERY: ICD-10-CM

## 2021-04-29 DIAGNOSIS — R53.83 FATIGUE, UNSPECIFIED TYPE: ICD-10-CM

## 2021-04-29 DIAGNOSIS — D51.8 OTHER VITAMIN B12 DEFICIENCY ANEMIAS: ICD-10-CM

## 2021-04-29 LAB
ALBUMIN SERPL BCP-MCNC: 3.7 G/DL (ref 3.5–5.2)
ALP SERPL-CCNC: 87 U/L (ref 55–135)
ALT SERPL W/O P-5'-P-CCNC: 55 U/L (ref 10–44)
ANION GAP SERPL CALC-SCNC: 8 MMOL/L (ref 8–16)
AST SERPL-CCNC: 33 U/L (ref 10–40)
BASOPHILS # BLD AUTO: 0.06 K/UL (ref 0–0.2)
BASOPHILS NFR BLD: 0.8 % (ref 0–1.9)
BILIRUB SERPL-MCNC: 0.1 MG/DL (ref 0.1–1)
BUN SERPL-MCNC: 12 MG/DL (ref 6–20)
CALCIUM SERPL-MCNC: 8.9 MG/DL (ref 8.7–10.5)
CHLORIDE SERPL-SCNC: 109 MMOL/L (ref 95–110)
CO2 SERPL-SCNC: 22 MMOL/L (ref 23–29)
CREAT SERPL-MCNC: 0.8 MG/DL (ref 0.5–1.4)
DIFFERENTIAL METHOD: ABNORMAL
EOSINOPHIL # BLD AUTO: 0.1 K/UL (ref 0–0.5)
EOSINOPHIL NFR BLD: 1.8 % (ref 0–8)
ERYTHROCYTE [DISTWIDTH] IN BLOOD BY AUTOMATED COUNT: 12.7 % (ref 11.5–14.5)
EST. GFR  (AFRICAN AMERICAN): >60 ML/MIN/1.73 M^2
EST. GFR  (NON AFRICAN AMERICAN): >60 ML/MIN/1.73 M^2
FERRITIN SERPL-MCNC: 52 NG/ML (ref 20–300)
GLUCOSE SERPL-MCNC: 164 MG/DL (ref 70–110)
HCT VFR BLD AUTO: 40.6 % (ref 37–48.5)
HGB BLD-MCNC: 13.2 G/DL (ref 12–16)
IMM GRANULOCYTES # BLD AUTO: 0.02 K/UL (ref 0–0.04)
IMM GRANULOCYTES NFR BLD AUTO: 0.3 % (ref 0–0.5)
IRON SERPL-MCNC: 51 UG/DL (ref 30–160)
LYMPHOCYTES # BLD AUTO: 2.6 K/UL (ref 1–4.8)
LYMPHOCYTES NFR BLD: 36 % (ref 18–48)
MCH RBC QN AUTO: 31.3 PG (ref 27–31)
MCHC RBC AUTO-ENTMCNC: 32.5 G/DL (ref 32–36)
MCV RBC AUTO: 96 FL (ref 82–98)
MONOCYTES # BLD AUTO: 0.5 K/UL (ref 0.3–1)
MONOCYTES NFR BLD: 6.5 % (ref 4–15)
NEUTROPHILS # BLD AUTO: 4 K/UL (ref 1.8–7.7)
NEUTROPHILS NFR BLD: 54.6 % (ref 38–73)
NRBC BLD-RTO: 0 /100 WBC
PLATELET # BLD AUTO: 373 K/UL (ref 150–450)
PMV BLD AUTO: 9.7 FL (ref 9.2–12.9)
POTASSIUM SERPL-SCNC: 4 MMOL/L (ref 3.5–5.1)
PROT SERPL-MCNC: 6.8 G/DL (ref 6–8.4)
RBC # BLD AUTO: 4.22 M/UL (ref 4–5.4)
SATURATED IRON: 12 % (ref 20–50)
SODIUM SERPL-SCNC: 139 MMOL/L (ref 136–145)
TOTAL IRON BINDING CAPACITY: 416 UG/DL (ref 250–450)
TRANSFERRIN SERPL-MCNC: 281 MG/DL (ref 200–375)
VIT B12 SERPL-MCNC: 234 PG/ML (ref 210–950)
WBC # BLD AUTO: 7.34 K/UL (ref 3.9–12.7)

## 2021-04-29 PROCEDURE — 82607 VITAMIN B-12: CPT | Performed by: NURSE PRACTITIONER

## 2021-04-29 PROCEDURE — 85025 COMPLETE CBC W/AUTO DIFF WBC: CPT | Performed by: NURSE PRACTITIONER

## 2021-04-29 PROCEDURE — 83540 ASSAY OF IRON: CPT | Performed by: NURSE PRACTITIONER

## 2021-04-29 PROCEDURE — 82728 ASSAY OF FERRITIN: CPT | Performed by: NURSE PRACTITIONER

## 2021-04-29 PROCEDURE — 80053 COMPREHEN METABOLIC PANEL: CPT | Performed by: NURSE PRACTITIONER

## 2021-04-29 PROCEDURE — 36415 COLL VENOUS BLD VENIPUNCTURE: CPT | Mod: PO | Performed by: NURSE PRACTITIONER

## 2021-05-03 ENCOUNTER — OFFICE VISIT (OUTPATIENT)
Dept: SURGERY | Facility: CLINIC | Age: 41
End: 2021-05-03
Payer: COMMERCIAL

## 2021-05-03 DIAGNOSIS — Z71.89 ENCOUNTER FOR MEDICATION COUNSELING: ICD-10-CM

## 2021-05-03 DIAGNOSIS — Z71.9 HEALTH EDUCATION/COUNSELING: ICD-10-CM

## 2021-05-03 DIAGNOSIS — Z71.89 COUNSELING AND COORDINATION OF CARE: ICD-10-CM

## 2021-05-03 DIAGNOSIS — Z98.84 S/P BARIATRIC SURGERY: ICD-10-CM

## 2021-05-03 DIAGNOSIS — D50.8 OTHER IRON DEFICIENCY ANEMIAS: Primary | ICD-10-CM

## 2021-05-03 DIAGNOSIS — D51.8 OTHER VITAMIN B12 DEFICIENCY ANEMIAS: ICD-10-CM

## 2021-05-03 DIAGNOSIS — Z71.89 COUNSELING ON HEALTH PROMOTION AND DISEASE PREVENTION: ICD-10-CM

## 2021-05-03 PROCEDURE — 99213 PR OFFICE/OUTPT VISIT, EST, LEVL III, 20-29 MIN: ICD-10-PCS | Mod: 95,,, | Performed by: NURSE PRACTITIONER

## 2021-05-03 PROCEDURE — 99213 OFFICE O/P EST LOW 20 MIN: CPT | Mod: 95,,, | Performed by: NURSE PRACTITIONER

## 2021-05-06 ENCOUNTER — PATIENT MESSAGE (OUTPATIENT)
Dept: RESEARCH | Facility: HOSPITAL | Age: 41
End: 2021-05-06

## 2021-05-17 ENCOUNTER — PATIENT MESSAGE (OUTPATIENT)
Dept: RHEUMATOLOGY | Facility: CLINIC | Age: 41
End: 2021-05-17

## 2021-05-17 ENCOUNTER — TELEPHONE (OUTPATIENT)
Dept: RHEUMATOLOGY | Facility: CLINIC | Age: 41
End: 2021-05-17

## 2021-05-18 ENCOUNTER — OFFICE VISIT (OUTPATIENT)
Dept: RHEUMATOLOGY | Facility: CLINIC | Age: 41
End: 2021-05-18
Payer: COMMERCIAL

## 2021-05-18 ENCOUNTER — PATIENT MESSAGE (OUTPATIENT)
Dept: RHEUMATOLOGY | Facility: CLINIC | Age: 41
End: 2021-05-18

## 2021-05-18 DIAGNOSIS — M79.7 FIBROMYALGIA: ICD-10-CM

## 2021-05-18 DIAGNOSIS — G93.32 CFS (CHRONIC FATIGUE SYNDROME): ICD-10-CM

## 2021-05-18 DIAGNOSIS — M35.09 SJOGREN'S SYNDROME WITH OTHER ORGAN INVOLVEMENT: Primary | ICD-10-CM

## 2021-05-18 PROCEDURE — 99214 PR OFFICE/OUTPT VISIT, EST, LEVL IV, 30-39 MIN: ICD-10-PCS | Mod: 95,,, | Performed by: INTERNAL MEDICINE

## 2021-05-18 PROCEDURE — 99214 OFFICE O/P EST MOD 30 MIN: CPT | Mod: 95,,, | Performed by: INTERNAL MEDICINE

## 2021-05-18 RX ORDER — CEVIMELINE HYDROCHLORIDE 30 MG/1
30 CAPSULE ORAL 3 TIMES DAILY
Qty: 90 CAPSULE | Refills: 11 | Status: SHIPPED | OUTPATIENT
Start: 2021-05-18 | End: 2022-07-25

## 2021-05-25 ENCOUNTER — PATIENT MESSAGE (OUTPATIENT)
Dept: RHEUMATOLOGY | Facility: CLINIC | Age: 41
End: 2021-05-25

## 2021-06-10 ENCOUNTER — OFFICE VISIT (OUTPATIENT)
Dept: NEUROLOGY | Facility: CLINIC | Age: 41
End: 2021-06-10
Payer: COMMERCIAL

## 2021-06-10 DIAGNOSIS — G43.009 MIGRAINE WITHOUT AURA AND WITHOUT STATUS MIGRAINOSUS, NOT INTRACTABLE: Primary | ICD-10-CM

## 2021-06-10 DIAGNOSIS — M79.7 FIBROMYALGIA: ICD-10-CM

## 2021-06-10 DIAGNOSIS — Z98.84 S/P BARIATRIC SURGERY: ICD-10-CM

## 2021-06-10 DIAGNOSIS — G93.32 CFS (CHRONIC FATIGUE SYNDROME): ICD-10-CM

## 2021-06-10 DIAGNOSIS — D50.8 OTHER IRON DEFICIENCY ANEMIAS: ICD-10-CM

## 2021-06-10 DIAGNOSIS — M35.09 SJOGREN'S SYNDROME WITH OTHER ORGAN INVOLVEMENT: ICD-10-CM

## 2021-06-10 DIAGNOSIS — D51.8 OTHER VITAMIN B12 DEFICIENCY ANEMIAS: ICD-10-CM

## 2021-06-10 PROCEDURE — 99213 OFFICE O/P EST LOW 20 MIN: CPT | Mod: 95,,, | Performed by: NURSE PRACTITIONER

## 2021-06-10 PROCEDURE — 99213 PR OFFICE/OUTPT VISIT, EST, LEVL III, 20-29 MIN: ICD-10-PCS | Mod: 95,,, | Performed by: NURSE PRACTITIONER

## 2021-07-19 ENCOUNTER — LAB VISIT (OUTPATIENT)
Dept: LAB | Facility: HOSPITAL | Age: 41
End: 2021-07-19
Payer: COMMERCIAL

## 2021-07-19 DIAGNOSIS — D50.8 OTHER IRON DEFICIENCY ANEMIAS: ICD-10-CM

## 2021-07-19 DIAGNOSIS — Z98.84 S/P BARIATRIC SURGERY: ICD-10-CM

## 2021-07-19 DIAGNOSIS — D51.8 OTHER VITAMIN B12 DEFICIENCY ANEMIAS: ICD-10-CM

## 2021-07-19 LAB
25(OH)D3+25(OH)D2 SERPL-MCNC: 38 NG/ML (ref 30–96)
ALBUMIN SERPL BCP-MCNC: 3.6 G/DL (ref 3.5–5.2)
ALP SERPL-CCNC: 66 U/L (ref 55–135)
ALT SERPL W/O P-5'-P-CCNC: 32 U/L (ref 10–44)
ANION GAP SERPL CALC-SCNC: 11 MMOL/L (ref 8–16)
AST SERPL-CCNC: 22 U/L (ref 10–40)
BASOPHILS # BLD AUTO: 0.03 K/UL (ref 0–0.2)
BASOPHILS NFR BLD: 0.6 % (ref 0–1.9)
BILIRUB SERPL-MCNC: 0.2 MG/DL (ref 0.1–1)
BUN SERPL-MCNC: 4 MG/DL (ref 6–20)
CALCIUM SERPL-MCNC: 9.5 MG/DL (ref 8.7–10.5)
CHLORIDE SERPL-SCNC: 111 MMOL/L (ref 95–110)
CO2 SERPL-SCNC: 21 MMOL/L (ref 23–29)
CREAT SERPL-MCNC: 0.7 MG/DL (ref 0.5–1.4)
DIFFERENTIAL METHOD: ABNORMAL
EOSINOPHIL # BLD AUTO: 0.1 K/UL (ref 0–0.5)
EOSINOPHIL NFR BLD: 1.4 % (ref 0–8)
ERYTHROCYTE [DISTWIDTH] IN BLOOD BY AUTOMATED COUNT: 13.9 % (ref 11.5–14.5)
EST. GFR  (AFRICAN AMERICAN): >60 ML/MIN/1.73 M^2
EST. GFR  (NON AFRICAN AMERICAN): >60 ML/MIN/1.73 M^2
FERRITIN SERPL-MCNC: 82 NG/ML (ref 20–300)
GLUCOSE SERPL-MCNC: 133 MG/DL (ref 70–110)
HCT VFR BLD AUTO: 41.5 % (ref 37–48.5)
HGB BLD-MCNC: 13.4 G/DL (ref 12–16)
IMM GRANULOCYTES # BLD AUTO: 0.02 K/UL (ref 0–0.04)
IMM GRANULOCYTES NFR BLD AUTO: 0.4 % (ref 0–0.5)
IRON SERPL-MCNC: 61 UG/DL (ref 30–160)
LYMPHOCYTES # BLD AUTO: 1.8 K/UL (ref 1–4.8)
LYMPHOCYTES NFR BLD: 35.7 % (ref 18–48)
MCH RBC QN AUTO: 31.2 PG (ref 27–31)
MCHC RBC AUTO-ENTMCNC: 32.3 G/DL (ref 32–36)
MCV RBC AUTO: 97 FL (ref 82–98)
MONOCYTES # BLD AUTO: 0.4 K/UL (ref 0.3–1)
MONOCYTES NFR BLD: 7.5 % (ref 4–15)
NEUTROPHILS # BLD AUTO: 2.7 K/UL (ref 1.8–7.7)
NEUTROPHILS NFR BLD: 54.4 % (ref 38–73)
NRBC BLD-RTO: 0 /100 WBC
PHOSPHATE SERPL-MCNC: 3 MG/DL (ref 2.7–4.5)
PLATELET # BLD AUTO: 404 K/UL (ref 150–450)
PMV BLD AUTO: 10.3 FL (ref 9.2–12.9)
POTASSIUM SERPL-SCNC: 3.4 MMOL/L (ref 3.5–5.1)
PROT SERPL-MCNC: 6.7 G/DL (ref 6–8.4)
RBC # BLD AUTO: 4.29 M/UL (ref 4–5.4)
SATURATED IRON: 18 % (ref 20–50)
SODIUM SERPL-SCNC: 143 MMOL/L (ref 136–145)
T4 SERPL-MCNC: 6.3 UG/DL (ref 4.5–11.5)
TOTAL IRON BINDING CAPACITY: 343 UG/DL (ref 250–450)
TRANSFERRIN SERPL-MCNC: 232 MG/DL (ref 200–375)
TSH SERPL DL<=0.005 MIU/L-ACNC: 0.66 UIU/ML (ref 0.4–4)
VIT B12 SERPL-MCNC: 484 PG/ML (ref 210–950)
WBC # BLD AUTO: 4.96 K/UL (ref 3.9–12.7)

## 2021-07-19 PROCEDURE — 80053 COMPREHEN METABOLIC PANEL: CPT | Performed by: NURSE PRACTITIONER

## 2021-07-19 PROCEDURE — 36415 COLL VENOUS BLD VENIPUNCTURE: CPT | Mod: PO | Performed by: NURSE PRACTITIONER

## 2021-07-19 PROCEDURE — 82728 ASSAY OF FERRITIN: CPT | Performed by: NURSE PRACTITIONER

## 2021-07-19 PROCEDURE — 82306 VITAMIN D 25 HYDROXY: CPT | Performed by: NURSE PRACTITIONER

## 2021-07-19 PROCEDURE — 84443 ASSAY THYROID STIM HORMONE: CPT | Performed by: NURSE PRACTITIONER

## 2021-07-19 PROCEDURE — 83540 ASSAY OF IRON: CPT | Performed by: NURSE PRACTITIONER

## 2021-07-19 PROCEDURE — 84255 ASSAY OF SELENIUM: CPT | Performed by: NURSE PRACTITIONER

## 2021-07-19 PROCEDURE — 84436 ASSAY OF TOTAL THYROXINE: CPT | Performed by: NURSE PRACTITIONER

## 2021-07-19 PROCEDURE — 82607 VITAMIN B-12: CPT | Performed by: NURSE PRACTITIONER

## 2021-07-19 PROCEDURE — 84207 ASSAY OF VITAMIN B-6: CPT | Performed by: NURSE PRACTITIONER

## 2021-07-19 PROCEDURE — 85025 COMPLETE CBC W/AUTO DIFF WBC: CPT | Performed by: NURSE PRACTITIONER

## 2021-07-19 PROCEDURE — 84590 ASSAY OF VITAMIN A: CPT | Performed by: NURSE PRACTITIONER

## 2021-07-19 PROCEDURE — 82525 ASSAY OF COPPER: CPT | Performed by: NURSE PRACTITIONER

## 2021-07-19 PROCEDURE — 84100 ASSAY OF PHOSPHORUS: CPT | Performed by: NURSE PRACTITIONER

## 2021-07-19 PROCEDURE — 84597 ASSAY OF VITAMIN K: CPT | Performed by: NURSE PRACTITIONER

## 2021-07-21 LAB — SELENIUM SERPL-MCNC: 118 NG/ML (ref 70–150)

## 2021-07-22 LAB — COPPER SERPL-MCNC: 1031 UG/L (ref 810–1990)

## 2021-07-23 LAB — PYRIDOXAL SERPL-MCNC: 6 UG/L (ref 5–50)

## 2021-07-24 LAB — PHYTONADIONE SERPL-MCNC: 0.63 NMOL/L (ref 0.22–4.88)

## 2021-07-27 LAB — VIT A SERPL-MCNC: 66 UG/DL (ref 38–106)

## 2021-08-02 ENCOUNTER — OFFICE VISIT (OUTPATIENT)
Dept: SURGERY | Facility: CLINIC | Age: 41
End: 2021-08-02
Payer: COMMERCIAL

## 2021-08-02 DIAGNOSIS — Z71.9 HEALTH EDUCATION/COUNSELING: ICD-10-CM

## 2021-08-02 DIAGNOSIS — Z98.84 S/P BARIATRIC SURGERY: Primary | ICD-10-CM

## 2021-08-02 DIAGNOSIS — D51.8 OTHER VITAMIN B12 DEFICIENCY ANEMIAS: ICD-10-CM

## 2021-08-02 DIAGNOSIS — Z71.89 COUNSELING ON HEALTH PROMOTION AND DISEASE PREVENTION: ICD-10-CM

## 2021-08-02 DIAGNOSIS — G93.32 CFS (CHRONIC FATIGUE SYNDROME): ICD-10-CM

## 2021-08-02 DIAGNOSIS — D50.8 OTHER IRON DEFICIENCY ANEMIAS: ICD-10-CM

## 2021-08-02 DIAGNOSIS — Z71.89 COUNSELING AND COORDINATION OF CARE: ICD-10-CM

## 2021-08-02 PROCEDURE — 99212 PR OFFICE/OUTPT VISIT, EST, LEVL II, 10-19 MIN: ICD-10-PCS | Mod: 95,,, | Performed by: NURSE PRACTITIONER

## 2021-08-02 PROCEDURE — 99212 OFFICE O/P EST SF 10 MIN: CPT | Mod: 95,,, | Performed by: NURSE PRACTITIONER

## 2021-08-18 DIAGNOSIS — M79.7 FIBROMYALGIA: ICD-10-CM

## 2021-08-19 RX ORDER — PREGABALIN 75 MG/1
75 CAPSULE ORAL 2 TIMES DAILY
Qty: 60 CAPSULE | Refills: 5 | Status: SHIPPED | OUTPATIENT
Start: 2021-08-19 | End: 2022-02-21

## 2021-11-29 ENCOUNTER — LAB VISIT (OUTPATIENT)
Dept: LAB | Facility: HOSPITAL | Age: 41
End: 2021-11-29
Attending: NURSE PRACTITIONER
Payer: COMMERCIAL

## 2021-11-29 DIAGNOSIS — D50.8 OTHER IRON DEFICIENCY ANEMIAS: ICD-10-CM

## 2021-11-29 DIAGNOSIS — Z98.84 S/P BARIATRIC SURGERY: ICD-10-CM

## 2021-11-29 LAB
ALBUMIN SERPL BCP-MCNC: 3.7 G/DL (ref 3.5–5.2)
ALP SERPL-CCNC: 59 U/L (ref 55–135)
ALT SERPL W/O P-5'-P-CCNC: 102 U/L (ref 10–44)
ANION GAP SERPL CALC-SCNC: 10 MMOL/L (ref 8–16)
AST SERPL-CCNC: 72 U/L (ref 10–40)
BASOPHILS # BLD AUTO: 0.04 K/UL (ref 0–0.2)
BASOPHILS NFR BLD: 0.5 % (ref 0–1.9)
BILIRUB SERPL-MCNC: 0.2 MG/DL (ref 0.1–1)
BUN SERPL-MCNC: 10 MG/DL (ref 6–20)
CALCIUM SERPL-MCNC: 9.2 MG/DL (ref 8.7–10.5)
CHLORIDE SERPL-SCNC: 109 MMOL/L (ref 95–110)
CO2 SERPL-SCNC: 20 MMOL/L (ref 23–29)
CREAT SERPL-MCNC: 0.7 MG/DL (ref 0.5–1.4)
DIFFERENTIAL METHOD: ABNORMAL
EOSINOPHIL # BLD AUTO: 0 K/UL (ref 0–0.5)
EOSINOPHIL NFR BLD: 0.1 % (ref 0–8)
ERYTHROCYTE [DISTWIDTH] IN BLOOD BY AUTOMATED COUNT: 12.9 % (ref 11.5–14.5)
EST. GFR  (AFRICAN AMERICAN): >60 ML/MIN/1.73 M^2
EST. GFR  (NON AFRICAN AMERICAN): >60 ML/MIN/1.73 M^2
FERRITIN SERPL-MCNC: 40 NG/ML (ref 20–300)
GLUCOSE SERPL-MCNC: 128 MG/DL (ref 70–110)
HCT VFR BLD AUTO: 40.1 % (ref 37–48.5)
HGB BLD-MCNC: 12.7 G/DL (ref 12–16)
IMM GRANULOCYTES # BLD AUTO: 0.03 K/UL (ref 0–0.04)
IMM GRANULOCYTES NFR BLD AUTO: 0.4 % (ref 0–0.5)
IRON SERPL-MCNC: 62 UG/DL (ref 30–160)
LYMPHOCYTES # BLD AUTO: 2.4 K/UL (ref 1–4.8)
LYMPHOCYTES NFR BLD: 30.1 % (ref 18–48)
MCH RBC QN AUTO: 30.7 PG (ref 27–31)
MCHC RBC AUTO-ENTMCNC: 31.7 G/DL (ref 32–36)
MCV RBC AUTO: 97 FL (ref 82–98)
MONOCYTES # BLD AUTO: 0.4 K/UL (ref 0.3–1)
MONOCYTES NFR BLD: 5.1 % (ref 4–15)
NEUTROPHILS # BLD AUTO: 5.1 K/UL (ref 1.8–7.7)
NEUTROPHILS NFR BLD: 63.8 % (ref 38–73)
NRBC BLD-RTO: 0 /100 WBC
PLATELET # BLD AUTO: 321 K/UL (ref 150–450)
PMV BLD AUTO: 9.8 FL (ref 9.2–12.9)
POTASSIUM SERPL-SCNC: 3.4 MMOL/L (ref 3.5–5.1)
PROT SERPL-MCNC: 6.6 G/DL (ref 6–8.4)
RBC # BLD AUTO: 4.14 M/UL (ref 4–5.4)
SATURATED IRON: 15 % (ref 20–50)
SODIUM SERPL-SCNC: 139 MMOL/L (ref 136–145)
TOTAL IRON BINDING CAPACITY: 407 UG/DL (ref 250–450)
TRANSFERRIN SERPL-MCNC: 275 MG/DL (ref 200–375)
WBC # BLD AUTO: 8.01 K/UL (ref 3.9–12.7)

## 2021-11-29 PROCEDURE — 84466 ASSAY OF TRANSFERRIN: CPT | Performed by: NURSE PRACTITIONER

## 2021-11-29 PROCEDURE — 85025 COMPLETE CBC W/AUTO DIFF WBC: CPT | Performed by: NURSE PRACTITIONER

## 2021-11-29 PROCEDURE — 82728 ASSAY OF FERRITIN: CPT | Performed by: NURSE PRACTITIONER

## 2021-11-29 PROCEDURE — 80053 COMPREHEN METABOLIC PANEL: CPT | Performed by: NURSE PRACTITIONER

## 2021-12-20 NOTE — PROGRESS NOTES
Subjective:       Patient ID: Shelby Meeks is a 41 y.o. female.    Chief Complaint: iron def anemia s/p bariatric surgery    HPI: Follow-up for iron def anemia and b12 deficiency s/p bariatric surgery    Last b12 injection 10/3/19 and 12/3/19- using sublinguinal B12 intermittently    Last visit with Dr. Evan Carroll on 5/28/18-    41year-old  lady had a gastric band in the year 2001 for weight loss and it was reversed in 2006.  She ended up having a gastric bypass in 2015.     Patient had partial hyst with unil ooph 6/10/19     She had  been taking oral iron for several months prior to the initial visit with Dr. Carroll  without any significant improvement.  Low iron and b12 levels were noted and she was given IV iron and started on supplemental b12 injections    Last IV iron was injectafer 3/23/18 and 4/2/18 and 10/21/19 and 10/28/19    Pt had gallblader removed in August 2018  Elevated liver enzymes noted since July 2018 on [facility] labs and [facility] labs  Pt had CT of abd/pelvis with contrast 2019 year that was wnl per pt report     6/22/18 abdominal ultrasound revealed fatty liver    Negative hep b and c labs 11/2018 at [facility]    Pt has ulrasound of her abd yearly and fibroscan with Dr. Roberts for fatty liver disease and history of elevated ALT. Scheduled for 1/25/2022    Denies any bm changes- taking b12 and multivitamin with iron. Not taking potassium    Review of Systems   Constitutional: Positive for fatigue. Negative for appetite change, fever and unexpected weight change.   HENT: Negative.    Eyes: Negative.  Negative for visual disturbance.   Respiratory: Negative.  Negative for cough and shortness of breath.    Cardiovascular: Negative.  Negative for chest pain.   Gastrointestinal: Negative.  Negative for abdominal pain, anal bleeding, blood in stool, constipation and diarrhea.   Endocrine: Negative.    Genitourinary: Negative.  Negative for frequency.   Musculoskeletal: Negative.  Negative for  back pain.   Skin: Negative.  Negative for rash.   Allergic/Immunologic: Negative.    Neurological: Negative.    Hematological: Negative.  Negative for adenopathy.   Psychiatric/Behavioral: Negative.  The patient is not nervous/anxious.    Pt admits to drinking only diet soda- no water during the day    Objective:      Physical Exam  Constitutional:       Appearance: Normal appearance. She is well-developed.   HENT:      Head: Normocephalic and atraumatic.      Mouth/Throat:      Pharynx: No oropharyngeal exudate.   Eyes:      General: No scleral icterus.        Right eye: No discharge.         Left eye: No discharge.      Conjunctiva/sclera: Conjunctivae normal.      Pupils: Pupils are equal, round, and reactive to light.   Neck:      Thyroid: No thyromegaly.   Pulmonary:      Effort: Pulmonary effort is normal.   Chest:   Breasts:      Right: No axillary adenopathy or supraclavicular adenopathy.      Left: No axillary adenopathy or supraclavicular adenopathy.       Musculoskeletal:      Right shoulder: No crepitus. Normal strength.      Cervical back: Normal range of motion and neck supple.   Lymphadenopathy:      Head:      Right side of head: No submental, submandibular, tonsillar, preauricular, posterior auricular or occipital adenopathy.      Left side of head: No submental, submandibular, tonsillar, preauricular, posterior auricular or occipital adenopathy.      Cervical:      Right cervical: No superficial or posterior cervical adenopathy.     Left cervical: No superficial or posterior cervical adenopathy.      Upper Body:      Right upper body: No supraclavicular or axillary adenopathy.      Left upper body: No supraclavicular or axillary adenopathy.   Skin:     Coloration: Skin is not pale.      Findings: No erythema or rash.   Neurological:      Mental Status: She is alert and oriented to person, place, and time.      Deep Tendon Reflexes: Reflexes are normal and symmetric.   Psychiatric:          Behavior: Behavior normal.         Thought Content: Thought content normal.         Judgment: Judgment normal.           LABS:  Results for CHANTEL APPIAH (MRN [id number]) as of 11/30/2021 06:10   Ref. Range 4/29/2021 15:41 7/19/2021 11:22 11/29/2021 15:55   WBC Latest Ref Range: 3.90 - 12.70 K/uL 7.34 4.96 8.01   RBC Latest Ref Range: 4.00 - 5.40 M/uL 4.22 4.29 4.14   Hemoglobin Latest Ref Range: 12.0 - 16.0 g/dL 13.2 13.4 12.7   Hematocrit Latest Ref Range: 37.0 - 48.5 % 40.6 41.5 40.1   MCV Latest Ref Range: 82 - 98 fL 96 97 97   MCH Latest Ref Range: 27.0 - 31.0 pg 31.3 (H) 31.2 (H) 30.7   MCHC Latest Ref Range: 32.0 - 36.0 g/dL 32.5 32.3 31.7 (L)   RDW Latest Ref Range: 11.5 - 14.5 % 12.7 13.9 12.9   Platelets Latest Ref Range: 150 - 450 K/uL 373 404 321   MPV Latest Ref Range: 9.2 - 12.9 fL 9.7 10.3 9.8   Gran % Latest Ref Range: 38.0 - 73.0 % 54.6 54.4 63.8   Lymph % Latest Ref Range: 18.0 - 48.0 % 36.0 35.7 30.1   Mono % Latest Ref Range: 4.0 - 15.0 % 6.5 7.5 5.1   Eosinophil % Latest Ref Range: 0.0 - 8.0 % 1.8 1.4 0.1   Basophil % Latest Ref Range: 0.0 - 1.9 % 0.8 0.6 0.5   Immature Granulocytes Latest Ref Range: 0.0 - 0.5 % 0.3 0.4 0.4   Gran # (ANC) Latest Ref Range: 1.8 - 7.7 K/uL 4.0 2.7 5.1   Lymph # Latest Ref Range: 1.0 - 4.8 K/uL 2.6 1.8 2.4   Mono # Latest Ref Range: 0.3 - 1.0 K/uL 0.5 0.4 0.4   Eos # Latest Ref Range: 0.0 - 0.5 K/uL 0.1 0.1 0.0   Baso # Latest Ref Range: 0.00 - 0.20 K/uL 0.06 0.03 0.04   Immature Grans (Abs) Latest Ref Range: 0.00 - 0.04 K/uL 0.02 0.02 0.03   nRBC Latest Ref Range: 0 /100 WBC 0 0 0   Differential Method Unknown Automated Automated Automated   Iron Latest Ref Range: 30 - 160 ug/dL 51 61 62   TIBC Latest Ref Range: 250 - 450 ug/dL 416 343 407   Saturated Iron Latest Ref Range: 20 - 50 % 12 (L) 18 (L) 15 (L)   Transferrin Latest Ref Range: 200 - 375 mg/dL 281 232 275   Ferritin Latest Ref Range: 20.0 - 300.0 ng/mL 52 82 40   Vitamin B-12 Latest Ref Range: 210 -  950 pg/mL 234 484      Results for CHANTEL APPIAH (MRN [id number]) as of 11/30/2021 06:10   Ref. Range 4/29/2021 15:41 7/19/2021 11:22 11/29/2021 15:55   Sodium Latest Ref Range: 136 - 145 mmol/L 139 143 139   Potassium Latest Ref Range: 3.5 - 5.1 mmol/L 4.0 3.4 (L) 3.4 (L)   Chloride Latest Ref Range: 95 - 110 mmol/L 109 111 (H) 109   CO2 Latest Ref Range: 23 - 29 mmol/L 22 (L) 21 (L) 20 (L)   Anion Gap Latest Ref Range: 8 - 16 mmol/L 8 11 10   BUN Latest Ref Range: 6 - 20 mg/dL 12 4 (L) 10   Creatinine Latest Ref Range: 0.5 - 1.4 mg/dL 0.8 0.7 0.7   eGFR if non African American Latest Ref Range: >60 mL/min/1.73 m^2 >60 >60.0 >60   eGFR if  Latest Ref Range: >60 mL/min/1.73 m^2 >60 >60.0 >60   Glucose Latest Ref Range: 70 - 110 mg/dL 164 (H) 133 (H) 128 (H)   Calcium Latest Ref Range: 8.7 - 10.5 mg/dL 8.9 9.5 9.2   Phosphorus Latest Ref Range: 2.7 - 4.5 mg/dL  3.0    Alkaline Phosphatase Latest Ref Range: 55 - 135 U/L 87 66 59   PROTEIN TOTAL Latest Ref Range: 6.0 - 8.4 g/dL 6.8 6.7 6.6   Albumin Latest Ref Range: 3.5 - 5.2 g/dL 3.7 3.6 3.7   BILIRUBIN TOTAL Latest Ref Range: 0.1 - 1.0 mg/dL 0.1 0.2 0.2   AST Latest Ref Range: 10 - 40 U/L 33 22 72 (H)   ALT Latest Ref Range: 10 - 44 U/L 55 (H) 32 102 (H)   hgb normal  Overall iron normal  Iron sat declined slightly  Liver enzymes elevated      Assessment:       1. S/P bariatric surgery    2. Other iron deficiency anemias    3. Other vitamin B12 deficiency anemias    4. CFS (chronic fatigue syndrome)        Plan:     1.      Last IV iron 10/21/19 and 10/28/19  2.      Hgb normal- plts normal- workup of elevated platelets in the past negative for BRYANNA mutation,   3.      B12 level was normal July 2021- improved- using dissolve tabs B12-   Iron levels normal - saturation 15 - not using slow fe - taking multivitamin only.    Rtc in 4 mons with cbc, cmp,  iron, ferritin and tibc, - will do bariatric panel Sept 2022- B1, b6, vit d, selinium, vit A and  vit K one week prior to visit with me for review- taking multivitamin bid  4.       Encouraged eating iron rich foods- call for any weakness or fatigue out of the ordinary. Encouraged to eat potassium rich foods-   5.       Elevated ALT-  hx of elevation in the past- has history of  fatty liver disease followed by Dr. Roberts - scheduled for f/u 1/25/2022-   Hep B and C labs at [facility] -11/2018 - negative   CT of abd and pelvis with contrast wnl 7/26/18   Hida scan normal 7/9/18   Ultrasound of gallbladder revealed fatty liver 6/22/18   Will start slow fe iron supplement and recheck levels in 4 mons- pt will call if has any changes in stamina. Continue taking multivitamin.     Elevated blood sugar followed by pcp per pt    Slightly low potassium- pt encouraged to eat potassium rich foods

## 2022-01-03 ENCOUNTER — OFFICE VISIT (OUTPATIENT)
Dept: SURGERY | Facility: CLINIC | Age: 42
End: 2022-01-03
Payer: COMMERCIAL

## 2022-01-03 VITALS
HEIGHT: 65 IN | WEIGHT: 208.31 LBS | DIASTOLIC BLOOD PRESSURE: 75 MMHG | HEART RATE: 74 BPM | RESPIRATION RATE: 16 BRPM | TEMPERATURE: 98 F | SYSTOLIC BLOOD PRESSURE: 120 MMHG | BODY MASS INDEX: 34.71 KG/M2 | OXYGEN SATURATION: 99 %

## 2022-01-03 DIAGNOSIS — Z98.84 S/P BARIATRIC SURGERY: Primary | ICD-10-CM

## 2022-01-03 DIAGNOSIS — D50.8 OTHER IRON DEFICIENCY ANEMIAS: ICD-10-CM

## 2022-01-03 DIAGNOSIS — G93.32 CFS (CHRONIC FATIGUE SYNDROME): ICD-10-CM

## 2022-01-03 DIAGNOSIS — D51.8 OTHER VITAMIN B12 DEFICIENCY ANEMIAS: ICD-10-CM

## 2022-01-03 PROCEDURE — 3078F DIAST BP <80 MM HG: CPT | Mod: CPTII,S$GLB,, | Performed by: NURSE PRACTITIONER

## 2022-01-03 PROCEDURE — 3008F PR BODY MASS INDEX (BMI) DOCUMENTED: ICD-10-PCS | Mod: CPTII,S$GLB,, | Performed by: NURSE PRACTITIONER

## 2022-01-03 PROCEDURE — 1159F PR MEDICATION LIST DOCUMENTED IN MEDICAL RECORD: ICD-10-PCS | Mod: CPTII,S$GLB,, | Performed by: NURSE PRACTITIONER

## 2022-01-03 PROCEDURE — 99214 PR OFFICE/OUTPT VISIT, EST, LEVL IV, 30-39 MIN: ICD-10-PCS | Mod: S$GLB,,, | Performed by: NURSE PRACTITIONER

## 2022-01-03 PROCEDURE — 99999 PR PBB SHADOW E&M-EST. PATIENT-LVL V: ICD-10-PCS | Mod: PBBFAC,,, | Performed by: NURSE PRACTITIONER

## 2022-01-03 PROCEDURE — 3078F PR MOST RECENT DIASTOLIC BLOOD PRESSURE < 80 MM HG: ICD-10-PCS | Mod: CPTII,S$GLB,, | Performed by: NURSE PRACTITIONER

## 2022-01-03 PROCEDURE — 3074F PR MOST RECENT SYSTOLIC BLOOD PRESSURE < 130 MM HG: ICD-10-PCS | Mod: CPTII,S$GLB,, | Performed by: NURSE PRACTITIONER

## 2022-01-03 PROCEDURE — 1160F RVW MEDS BY RX/DR IN RCRD: CPT | Mod: CPTII,S$GLB,, | Performed by: NURSE PRACTITIONER

## 2022-01-03 PROCEDURE — 99999 PR PBB SHADOW E&M-EST. PATIENT-LVL V: CPT | Mod: PBBFAC,,, | Performed by: NURSE PRACTITIONER

## 2022-01-03 PROCEDURE — 3074F SYST BP LT 130 MM HG: CPT | Mod: CPTII,S$GLB,, | Performed by: NURSE PRACTITIONER

## 2022-01-03 PROCEDURE — 3008F BODY MASS INDEX DOCD: CPT | Mod: CPTII,S$GLB,, | Performed by: NURSE PRACTITIONER

## 2022-01-03 PROCEDURE — 1160F PR REVIEW ALL MEDS BY PRESCRIBER/CLIN PHARMACIST DOCUMENTED: ICD-10-PCS | Mod: CPTII,S$GLB,, | Performed by: NURSE PRACTITIONER

## 2022-01-03 PROCEDURE — 1159F MED LIST DOCD IN RCRD: CPT | Mod: CPTII,S$GLB,, | Performed by: NURSE PRACTITIONER

## 2022-01-03 PROCEDURE — 99214 OFFICE O/P EST MOD 30 MIN: CPT | Mod: S$GLB,,, | Performed by: NURSE PRACTITIONER

## 2022-01-03 RX ORDER — HYDROCODONE BITARTRATE AND ACETAMINOPHEN 5; 325 MG/1; MG/1
TABLET ORAL
COMMUNITY
Start: 2021-10-07 | End: 2022-03-03

## 2022-02-23 DIAGNOSIS — D50.8 OTHER IRON DEFICIENCY ANEMIAS: Primary | ICD-10-CM

## 2022-02-23 DIAGNOSIS — Z98.84 S/P BARIATRIC SURGERY: ICD-10-CM

## 2022-03-02 ENCOUNTER — PATIENT MESSAGE (OUTPATIENT)
Dept: NEUROLOGY | Facility: CLINIC | Age: 42
End: 2022-03-02
Payer: COMMERCIAL

## 2022-03-03 ENCOUNTER — OFFICE VISIT (OUTPATIENT)
Dept: NEUROLOGY | Facility: CLINIC | Age: 42
End: 2022-03-03
Payer: COMMERCIAL

## 2022-03-03 VITALS
HEIGHT: 65 IN | DIASTOLIC BLOOD PRESSURE: 64 MMHG | RESPIRATION RATE: 16 BRPM | BODY MASS INDEX: 34.24 KG/M2 | WEIGHT: 205.5 LBS | SYSTOLIC BLOOD PRESSURE: 126 MMHG

## 2022-03-03 DIAGNOSIS — G43.009 MIGRAINE WITHOUT AURA AND WITHOUT STATUS MIGRAINOSUS, NOT INTRACTABLE: Primary | ICD-10-CM

## 2022-03-03 PROCEDURE — 3078F DIAST BP <80 MM HG: CPT | Mod: CPTII,S$GLB,, | Performed by: NURSE PRACTITIONER

## 2022-03-03 PROCEDURE — 3008F PR BODY MASS INDEX (BMI) DOCUMENTED: ICD-10-PCS | Mod: CPTII,S$GLB,, | Performed by: NURSE PRACTITIONER

## 2022-03-03 PROCEDURE — 3078F PR MOST RECENT DIASTOLIC BLOOD PRESSURE < 80 MM HG: ICD-10-PCS | Mod: CPTII,S$GLB,, | Performed by: NURSE PRACTITIONER

## 2022-03-03 PROCEDURE — 99215 OFFICE O/P EST HI 40 MIN: CPT | Mod: S$GLB,,, | Performed by: NURSE PRACTITIONER

## 2022-03-03 PROCEDURE — 3008F BODY MASS INDEX DOCD: CPT | Mod: CPTII,S$GLB,, | Performed by: NURSE PRACTITIONER

## 2022-03-03 PROCEDURE — 99999 PR PBB SHADOW E&M-EST. PATIENT-LVL V: ICD-10-PCS | Mod: PBBFAC,,, | Performed by: NURSE PRACTITIONER

## 2022-03-03 PROCEDURE — 99215 PR OFFICE/OUTPT VISIT, EST, LEVL V, 40-54 MIN: ICD-10-PCS | Mod: S$GLB,,, | Performed by: NURSE PRACTITIONER

## 2022-03-03 PROCEDURE — 1160F PR REVIEW ALL MEDS BY PRESCRIBER/CLIN PHARMACIST DOCUMENTED: ICD-10-PCS | Mod: CPTII,S$GLB,, | Performed by: NURSE PRACTITIONER

## 2022-03-03 PROCEDURE — 1159F MED LIST DOCD IN RCRD: CPT | Mod: CPTII,S$GLB,, | Performed by: NURSE PRACTITIONER

## 2022-03-03 PROCEDURE — 3074F PR MOST RECENT SYSTOLIC BLOOD PRESSURE < 130 MM HG: ICD-10-PCS | Mod: CPTII,S$GLB,, | Performed by: NURSE PRACTITIONER

## 2022-03-03 PROCEDURE — 99999 PR PBB SHADOW E&M-EST. PATIENT-LVL V: CPT | Mod: PBBFAC,,, | Performed by: NURSE PRACTITIONER

## 2022-03-03 PROCEDURE — 3074F SYST BP LT 130 MM HG: CPT | Mod: CPTII,S$GLB,, | Performed by: NURSE PRACTITIONER

## 2022-03-03 PROCEDURE — 4010F PR ACE/ARB THEARPY RXD/TAKEN: ICD-10-PCS | Mod: CPTII,S$GLB,, | Performed by: NURSE PRACTITIONER

## 2022-03-03 PROCEDURE — 1160F RVW MEDS BY RX/DR IN RCRD: CPT | Mod: CPTII,S$GLB,, | Performed by: NURSE PRACTITIONER

## 2022-03-03 PROCEDURE — 1159F PR MEDICATION LIST DOCUMENTED IN MEDICAL RECORD: ICD-10-PCS | Mod: CPTII,S$GLB,, | Performed by: NURSE PRACTITIONER

## 2022-03-03 PROCEDURE — 4010F ACE/ARB THERAPY RXD/TAKEN: CPT | Mod: CPTII,S$GLB,, | Performed by: NURSE PRACTITIONER

## 2022-03-03 RX ORDER — HYDROXYZINE PAMOATE 25 MG/1
25 CAPSULE ORAL DAILY PRN
Qty: 3 CAPSULE | Refills: 0 | Status: SHIPPED | OUTPATIENT
Start: 2022-03-03 | End: 2022-09-06

## 2022-03-03 NOTE — PROGRESS NOTES
Subjective:       Patient ID: Shelby Meeks is a 42 y.o. female.    Chief Complaint:  Migraine without aura and without status migrainosus, not i      Patient is a former Patient of Dr. Goodwin. The patient is here for evaluation of headaches. The headaches started at age 12. Patient states that as she grew up the headache went away until she was approximately 19 years old then she began to have migraines headaches progress right side aching- throbbing nature, and also from the neck and the back of the head. It can involve the right side aching dull pain. No visual aura. No associated neurological deficits. History is significant for prodromal irritability and urinary frequency.  The headaches are getting more frequent patient states that in the morning and afternoon she is fine. She stated the onset of her headaches are at night.  The headaches are also getting more severe 6-8/10 headaches that cause significant morbidity. The headache is associated with nausea and light, sound sensitivity. Abortive meds like OTC NSAIDs and sumatriptans have been partially helpful. Patient states she try to avoid taking the Sumatriptan because it makes her very nueasted and she feels bad for approximately 30 mins after taking the medication. The patient was also taking barbiturate-narcotic-caffeine-based medication she states it helps but the headache is sure to come back after taking the medication.  The headaches last for several hours. Physical and emotional stressors provoke and aggravate the headache as well as neck pain. The patient state in 2016 she had surgery on C4-C5 the patient states she had artifical disc placed by Dr. Arturo Jansen [facility]sur[facility]. The patient states she recently started back seeing him because she started to experience increased neck pain. The patient states she has arthritis in her neck and she sees rheumatology as well for management.  The headache builds up slowly  towards the middle of the day or the end of the day.   The headaches are progressively getting worse and interfering with daily activity.  The patient takes Topiramate/Topamax 50 mg daily. Patient states the medication helps in the morning and afternoon but at night she no longer receives any benefit. Lack of sleep is a significant trigger of the headache.  Positive neck pain with radiation. No TMJ problems.  The patient denies blurry vision and ear ringing. The headache is not positional or postural but worsens with movement and valsalva. Sleep help lessen the headache. No history of head trauma. No history of seizures. No history of smoking. No caffeine overuses. No vertigo. No blacking out.  No fever, chills or rigors. No history of significant memory loss. No history of strokes.  The patient cannot think of food that aggravates the headache. Family history is remarkable for migraine with her mother having migraines. TPM 50 mg po was increased from TPM 50 mg daily to twice per day. The patient reports she is experiencing migraines 15 days out the month. The migraines progress right side aching- throbbing nature, and also from the neck and the back of the head. It can involve the right side aching dull pain No visual aura. No associated neurological deficits. The patient  took the Electriptan (Relpax) 40 mg once and caused her to be exteremly lightheaded and she felt worse so she discontinued taking that medication. Tolerating  mg po BID no side effects. Patient has found Rimegepant (Nurtec) 75 mg very helpful. Patient states it works well without side effects. Patient request no change.     Interval History 3-3-2022: Patient established with Corinne Mccullough NP, new to me. Patient presents to appointment unaccompanied to discuss headaches. Patient states she currently takes  mg BID without adverse effects. She states she was on Nurtec previously but the medication became too expensive. She states she  has had daily headaches for the last week. Previously, she was having headaches 2 to 3 days a month. She states she had a severe headache that lasted from 2022 to 2022. It improved on 2022 and returned on 2022. The headache is less severe, 3-4/10, at this time. She describes her headache as sharp, located in the frontal region. She has tried tylenol, ibuprofen, and aleve, which are not helping. She states she has had a brain MRI in the past at the [facility] – [place], [place].      Past Medical History:   Diagnosis Date    Anemia     Asthma     Childhood    Diabetes mellitus     Fibromyalgia     Heart disease     Hypertension      Past Surgical History:   Procedure Laterality Date    CERVICAL DISC ARTHROPLASTY  2015    2 aritificial discs placed    GASTRIC BYPASS  2015    HYSTERECTOMY      Lab band reversal      LAPAROSCOPIC GASTRIC BANDING  2001    OOPHORECTOMY  2019    ROBOT-ASSISTED LAPAROSCOPIC HYSTERECTOMY      SINUS SURGERY  1996    SINUS SURGERY  2007    TONSILLECTOMY, ADENOIDECTOMY  1981     Social History     Socioeconomic History    Marital status: Single   Occupational History    Occupation: Teacher   Tobacco Use    Smoking status: Former Smoker     Packs/day: 0.50     Years: 10.00     Pack years: 5.00     Types: Cigarettes     Quit date:      Years since quittin.1    Smokeless tobacco: Never Used   Substance and Sexual Activity    Alcohol use: No    Drug use: No    Sexual activity: Never     Partners: Male     Birth control/protection: Abstinence, Injection       Review of Systems  Review of Systems   Constitutional: Negative for appetite change and fatigue.   HENT: Negative for drooling, hearing loss, tinnitus, trouble swallowing and voice change.    Eyes: Negative for photophobia and visual disturbance.   Cardiovascular: Negative for palpitations.   Gastrointestinal: Positive for nausea. Negative for constipation, diarrhea and vomiting.    Genitourinary: Negative for difficulty urinating.   Musculoskeletal: Positive for arthralgias, myalgias and neck pain. Negative for back pain and gait problem.   Neurological: Positive for headaches. Negative for dizziness, tremors, seizures, syncope, facial asymmetry, speech difficulty, weakness, light-headedness and numbness.   Psychiatric/Behavioral: Negative for behavioral problems, confusion and hallucinations. The patient is nervous/anxious.           Past/Current Medical/Surgical History, Past/Current Social History, Past/Current Family History and Past/Current Medications were reviewed in detail.  Objective:      Neurologic Exam     Mental Status   Oriented to person, place, and time.   Follows 3 step commands.   Attention: normal. Concentration: normal.   Speech: speech is normal   Level of consciousness: alert  Knowledge: good and consistent with education.   Able to name object. Able to repeat. Normal comprehension.     Cranial Nerves     CN II   Visual fields full to confrontation.   Visual acuity: normal  Right visual field deficit: none  Left visual field deficit: none     CN III, IV, VI   Pupils are equal, round, and reactive to light.  Extraocular motions are normal.   Right pupil: Size: 4 mm. Shape: regular. Reactivity: brisk. Consensual response: intact. Accommodation: intact.   Left pupil: Size: 4 mm. Shape: regular. Reactivity: brisk. Consensual response: intact. Accommodation: intact.   CN III: no CN III palsy  CN VI: no CN VI palsy  Nystagmus: none   Diplopia: none  Ophthalmoparesis: none  Upgaze: normal  Downgaze: normal  Conjugate gaze: absent    CN V   Facial sensation intact.   Right facial sensation deficit: none  Left facial sensation deficit: none    CN VII   Right facial weakness: none  Left facial weakness: none    CN VIII   CN VIII normal.   Hearing: intact    CN IX, X   CN IX normal.   CN X normal.   Palate: symmetric    CN XI   CN XI normal.   Right sternocleidomastoid strength:  normal  Left sternocleidomastoid strength: normal  Right trapezius strength: normal  Left trapezius strength: normal    CN XII   CN XII normal.   Tongue: not atrophic  Fasciculations: absent  Tongue deviation: none    Motor Exam   Muscle bulk: normal  Overall muscle tone: normal  Right arm tone: normal  Left arm tone: normal  Right arm pronator drift: absent  Left arm pronator drift: absent  Right leg tone: normal  Left leg tone: normal    Strength   Strength 5/5 throughout.   Right neck flexion: 5/5  Left neck flexion: 5/5  Right neck extension: 5/5  Left neck extension: 5/5  Right deltoid: 5/5  Left deltoid: 5/5  Right biceps: 5/5  Left biceps: 5/5  Right triceps: 5/5  Left triceps: 5/5  Right wrist flexion: 5/5  Left wrist flexion: 5/5  Right wrist extension: 5/5  Left wrist extension: 5/5  Right interossei: 5/5  Left interossei: 5/5  Right iliopsoas: 5/5  Left iliopsoas: 5/5  Right quadriceps: 5/5  Left quadriceps: 5/5  Right hamstrin/5  Left hamstrin/5  Right glutei: 5/5  Left glutei: 5/5  Right anterior tibial: 5/5  Left anterior tibial: 5/5  Right posterior tibial: 5/5  Left posterior tibial: 5/5  Right peroneal: 5/5  Left peroneal: 5/5  Right gastroc: 5/5  Left gastroc: 5/5    Sensory Exam   Light touch normal.   Right arm light touch: normal  Left arm light touch: normal  Right leg light touch: normal  Left leg light touch: normal  Vibration normal.   Right arm vibration: normal  Left arm vibration: normal  Right leg vibration: normal  Left leg vibration: normal  Proprioception normal.   Right arm proprioception: normal  Left arm proprioception: normal  Right leg proprioception: normal  Left leg proprioception: normal  Pinprick normal.   Right arm pinprick: normal  Left arm pinprick: normal  Right leg pinprick: normal  Left leg pinprick: normal    Gait, Coordination, and Reflexes     Gait  Gait: normal    Coordination   Romberg: negative  Finger to nose coordination: normal  Heel to shin  coordination: normal  Tandem walking coordination: normal    Tremor   Resting tremor: absent  Intention tremor: absent  Action tremor: absent    Reflexes   Reflexes 2+ except as noted.   Right brachioradialis: 2+  Left brachioradialis: 2+  Right biceps: 2+  Left biceps: 2+  Right triceps: 2+  Left triceps: 2+  Right patellar: 2+  Left patellar: 2+  Right achilles: 2+  Left achilles: 2+  Right plantar: normal  Left plantar: normal  Right Robison: absent  Left Robison: absent  Right ankle clonus: absent  Left ankle clonus: absent  Right pendular knee jerk: absent  Left pendular knee jerk: absent             VITAL SIGNS WERE REVIEWED        GENERAL APPEARANCE:      The patient looks comfortable.     Body habitus is overweight.      No signs of respiratory distress.     Normal breathing pattern.     No dysmorphic features     Normal eye contact.      GENERAL MEDICAL EXAM:     HEENT:  Head is atraumatic normocephalic.      No tender temporal arteries. Fundoscopic (Ophthalmoscopic) exam showed no disc edema.       Neck and Axillae: No JVD. No visible lesions.     Cardiopulmonary: No cyanosis. No tachypnea. Normal respiratory effort.      Gastrointestinal/Urogenital:  No jaundice. No stomas or lesions. No visible hernias. No catheters. S/P Partial Colectomy.      Skin, Hair and Nails: RUE burn marks. No neurofibromatosis. No visible lesions.No stigmata of autoimmune disease. No clubbing.    Limbs: No varicose veins. No visible swelling. No palpable edema.      Muskoskeletal: No visible deformities.No visible lesions.     No spine tenderness. No signs of longstanding neuropathy. No dislocations or fractures.     Lab Results   Component Value Date    WBC 8.01 11/29/2021    RBC 4.14 11/29/2021    HGB 12.7 11/29/2021    HCT 40.1 11/29/2021    MCV 97 11/29/2021    MCH 30.7 11/29/2021    MCHC 31.7 (L) 11/29/2021    RDW 12.9 11/29/2021     11/29/2021    MPV 9.8 11/29/2021    GRAN 5.1 11/29/2021    GRAN 63.8 11/29/2021     LYMPH 2.4 11/29/2021    LYMPH 30.1 11/29/2021    MONO 0.4 11/29/2021    MONO 5.1 11/29/2021    EOS 0.0 11/29/2021    BASO 0.04 11/29/2021    EOSINOPHIL 0.1 11/29/2021    BASOPHIL 0.5 11/29/2021     CMP  Sodium   Date Value Ref Range Status   11/29/2021 139 136 - 145 mmol/L Final     Potassium   Date Value Ref Range Status   11/29/2021 3.4 (L) 3.5 - 5.1 mmol/L Final     Chloride   Date Value Ref Range Status   11/29/2021 109 95 - 110 mmol/L Final     CO2   Date Value Ref Range Status   11/29/2021 20 (L) 23 - 29 mmol/L Final     Glucose   Date Value Ref Range Status   11/29/2021 128 (H) 70 - 110 mg/dL Final     BUN   Date Value Ref Range Status   11/29/2021 10 6 - 20 mg/dL Final     Creatinine   Date Value Ref Range Status   11/29/2021 0.7 0.5 - 1.4 mg/dL Final     Calcium   Date Value Ref Range Status   11/29/2021 9.2 8.7 - 10.5 mg/dL Final     Total Protein   Date Value Ref Range Status   11/29/2021 6.6 6.0 - 8.4 g/dL Final     Albumin   Date Value Ref Range Status   11/29/2021 3.7 3.5 - 5.2 g/dL Final     Total Bilirubin   Date Value Ref Range Status   11/29/2021 0.2 0.1 - 1.0 mg/dL Final     Comment:     For infants and newborns, interpretation of results should be based  on gestational age, weight and in agreement with clinical  observations.    Premature Infant recommended reference ranges:  Up to 24 hours.............<8.0 mg/dL  Up to 48 hours............<12.0 mg/dL  3-5 days..................<15.0 mg/dL  6-29 days.................<15.0 mg/dL       Alkaline Phosphatase   Date Value Ref Range Status   11/29/2021 59 55 - 135 U/L Final     AST   Date Value Ref Range Status   11/29/2021 72 (H) 10 - 40 U/L Final     ALT   Date Value Ref Range Status   11/29/2021 102 (H) 10 - 44 U/L Final     Anion Gap   Date Value Ref Range Status   11/29/2021 10 8 - 16 mmol/L Final     eGFR if    Date Value Ref Range Status   11/29/2021 >60 >60 mL/min/1.73 m^2 Final     eGFR if non    Date Value Ref  Range Status   11/29/2021 >60 >60 mL/min/1.73 m^2 Final     Comment:     Calculation used to obtain the estimated glomerular filtration  rate (eGFR) is the CKD-EPI equation.           Lab Results   Component Value Date    TSH 0.657 07/19/2021          Assessment:        1. Migraine without aura and without status migrainosus, not intractable            Plan:   Migraine without aura and without status migrainosus, not intractable       Request Records from MRI Brain report from [facility]        HEADACHE DIARY      DISCUSSED THE THREE-FOLD MANAGEMENT OF MIGRAINE:       LIFESTYLE CHANGES:      Good sleep hygiene  Avoid general triggers like lack of sleep/too much sleep, prolonged sun exposure, excessive screen time and specific triggers based on you own diary   Minimize physical and emotional stress  Smoking avoidance and cessation  Limit caffeine drinks to 1-2 a day   Good hydration   Small frequent meals and avoid skipping meals   Moderate 30-minute-long aerobic exercises 3 times/week. Avoid strenuous exercise         ABORTIVE MEDICATIONS:     Restart Rimegepant (Nurtec) 75 mg po prn ( CGRP receptor antagonist available in a fast-acting orally disintegrating tablet). Patient given savings card.      Failed Sumatriptan, Eletriptan (Relpax)  Fioricet     Should only be taken 2-3 times/week to avoid rebound and overuse headaches.     Take at the ONSET of the headache Nurtec  PO  (or other Triptan) in combination with naproxen 500 mg PO or Ibuprofen 800 mg PO for headache without nausea or vomiting.  This regimen can be repeated only once in 24 hours after 2 hours.     I-explained to the patient that pain meds especially triptans should NOT be taken daily to avoid Rebound Headache and Overuse Headache.   If the headache is severe and  persists beyond 36 hours the patient needs to come to the hospital.           PREVENTATIVE MEDICATIONS:       Continue Topiramate (TOPAMAX) 100 mg BID     If headaches  continue despite restarting Nurtec, will consider d/c Topamax and consider starting amitriptyline.       Next Option:     Amitriptyline/Elavil     Propranolol/Inderal      Valproic acid/Depakote      Anti-CGRP Agents:     Botox Injections      ACUTE MANAGEMENT    Start Visceral 25 mg QHS PRN for 3 days for acute headache. Do not take Zyrtec while taking Visceral. Patient verabalized understanding          MEDICAL/SURGICAL COMORBIDITIES       All relevant medical comorbidities noted and managed by primary care physician and medical care team.          MISCELLANEOUS MEDICAL PROBLEMS         HEALTHY LIFESTYLE AND PREVENTATIVE CARE     Encouraged the patient to adhere to the age-appropriate health maintenance guidelines including screening tests and vaccinations.      Discussed the overall importance of healthy lifestyle, optimal weight, exercise, healthy diet, good sleep hygiene and avoiding drugs including smoking, alcohol and recreational drugs. The patient verbalized full understanding.         Advised the patient to follow COVID-19 prevention measures.         I spent a total of 50 minutes on the day of the visit.  This includes face to face time and non-face to face time preparing to see the patient (eg, review of tests), obtaining and/or reviewing separately obtained history, documenting clinical information in the electronic or other health record, independently interpreting results and communicating results to the patient/family/caregiver, or care coordinator.          Keep appointment with Dr. Joshua. Schedule appointment sooner if headaches continue despite restarting Nurtec             Nola Fitzpatrick, MSN, NP    Collaborating Provider: Jose Alfredo Joshua MD, FAAN Neurologist/Epileptologist

## 2022-03-07 DIAGNOSIS — M35.09 SJOGREN'S SYNDROME WITH OTHER ORGAN INVOLVEMENT: ICD-10-CM

## 2022-03-07 DIAGNOSIS — M79.7 FIBROMYALGIA: ICD-10-CM

## 2022-03-07 DIAGNOSIS — G93.32 CFS (CHRONIC FATIGUE SYNDROME): ICD-10-CM

## 2022-04-21 DIAGNOSIS — G43.009 MIGRAINE WITHOUT AURA AND WITHOUT STATUS MIGRAINOSUS, NOT INTRACTABLE: ICD-10-CM

## 2022-04-25 ENCOUNTER — TELEPHONE (OUTPATIENT)
Dept: NEUROLOGY | Facility: CLINIC | Age: 42
End: 2022-04-25
Payer: COMMERCIAL

## 2022-05-11 ENCOUNTER — LAB VISIT (OUTPATIENT)
Dept: LAB | Facility: HOSPITAL | Age: 42
End: 2022-05-11
Attending: NURSE PRACTITIONER
Payer: COMMERCIAL

## 2022-05-11 DIAGNOSIS — D50.8 OTHER IRON DEFICIENCY ANEMIAS: ICD-10-CM

## 2022-05-11 DIAGNOSIS — Z98.84 S/P BARIATRIC SURGERY: ICD-10-CM

## 2022-05-11 LAB
ALBUMIN SERPL BCP-MCNC: 3.4 G/DL (ref 3.5–5.2)
ALP SERPL-CCNC: 73 U/L (ref 55–135)
ALT SERPL W/O P-5'-P-CCNC: 44 U/L (ref 10–44)
ANION GAP SERPL CALC-SCNC: 9 MMOL/L (ref 8–16)
AST SERPL-CCNC: 27 U/L (ref 10–40)
BASOPHILS # BLD AUTO: 0.02 K/UL (ref 0–0.2)
BASOPHILS NFR BLD: 0.3 % (ref 0–1.9)
BILIRUB SERPL-MCNC: 0.2 MG/DL (ref 0.1–1)
BUN SERPL-MCNC: 9 MG/DL (ref 6–20)
CALCIUM SERPL-MCNC: 9 MG/DL (ref 8.7–10.5)
CHLORIDE SERPL-SCNC: 110 MMOL/L (ref 95–110)
CO2 SERPL-SCNC: 22 MMOL/L (ref 23–29)
CREAT SERPL-MCNC: 0.7 MG/DL (ref 0.5–1.4)
DIFFERENTIAL METHOD: ABNORMAL
EOSINOPHIL # BLD AUTO: 0.1 K/UL (ref 0–0.5)
EOSINOPHIL NFR BLD: 2 % (ref 0–8)
ERYTHROCYTE [DISTWIDTH] IN BLOOD BY AUTOMATED COUNT: 13.7 % (ref 11.5–14.5)
EST. GFR  (AFRICAN AMERICAN): >60 ML/MIN/1.73 M^2
EST. GFR  (NON AFRICAN AMERICAN): >60 ML/MIN/1.73 M^2
FERRITIN SERPL-MCNC: 17 NG/ML (ref 20–300)
GLUCOSE SERPL-MCNC: 161 MG/DL (ref 70–110)
HCT VFR BLD AUTO: 37.8 % (ref 37–48.5)
HGB BLD-MCNC: 12 G/DL (ref 12–16)
IMM GRANULOCYTES # BLD AUTO: 0.02 K/UL (ref 0–0.04)
IMM GRANULOCYTES NFR BLD AUTO: 0.3 % (ref 0–0.5)
IRON SERPL-MCNC: 30 UG/DL (ref 30–160)
LYMPHOCYTES # BLD AUTO: 2.4 K/UL (ref 1–4.8)
LYMPHOCYTES NFR BLD: 38.7 % (ref 18–48)
MCH RBC QN AUTO: 30.7 PG (ref 27–31)
MCHC RBC AUTO-ENTMCNC: 31.7 G/DL (ref 32–36)
MCV RBC AUTO: 97 FL (ref 82–98)
MONOCYTES # BLD AUTO: 0.7 K/UL (ref 0.3–1)
MONOCYTES NFR BLD: 10.9 % (ref 4–15)
NEUTROPHILS # BLD AUTO: 2.9 K/UL (ref 1.8–7.7)
NEUTROPHILS NFR BLD: 47.8 % (ref 38–73)
NRBC BLD-RTO: 0 /100 WBC
PLATELET # BLD AUTO: 293 K/UL (ref 150–450)
PMV BLD AUTO: 9.6 FL (ref 9.2–12.9)
POTASSIUM SERPL-SCNC: 3.8 MMOL/L (ref 3.5–5.1)
PROT SERPL-MCNC: 6.4 G/DL (ref 6–8.4)
RBC # BLD AUTO: 3.91 M/UL (ref 4–5.4)
SATURATED IRON: 7 % (ref 20–50)
SODIUM SERPL-SCNC: 141 MMOL/L (ref 136–145)
TOTAL IRON BINDING CAPACITY: 406 UG/DL (ref 250–450)
TRANSFERRIN SERPL-MCNC: 274 MG/DL (ref 200–375)
WBC # BLD AUTO: 6.08 K/UL (ref 3.9–12.7)

## 2022-05-11 PROCEDURE — 80053 COMPREHEN METABOLIC PANEL: CPT | Performed by: NURSE PRACTITIONER

## 2022-05-11 PROCEDURE — 82728 ASSAY OF FERRITIN: CPT | Performed by: NURSE PRACTITIONER

## 2022-05-11 PROCEDURE — 84466 ASSAY OF TRANSFERRIN: CPT | Performed by: NURSE PRACTITIONER

## 2022-05-11 PROCEDURE — 85025 COMPLETE CBC W/AUTO DIFF WBC: CPT | Performed by: NURSE PRACTITIONER

## 2022-05-12 RX ORDER — SODIUM CHLORIDE 0.9 % (FLUSH) 0.9 %
10 SYRINGE (ML) INJECTION
Status: CANCELLED | OUTPATIENT
Start: 2022-05-31

## 2022-05-12 RX ORDER — DIPHENHYDRAMINE HYDROCHLORIDE 50 MG/ML
50 INJECTION INTRAMUSCULAR; INTRAVENOUS ONCE AS NEEDED
Status: CANCELLED | OUTPATIENT
Start: 2022-05-31

## 2022-05-12 RX ORDER — METHYLPREDNISOLONE SOD SUCC 125 MG
125 VIAL (EA) INJECTION
Status: CANCELLED
Start: 2022-05-31

## 2022-05-12 RX ORDER — HEPARIN 100 UNIT/ML
500 SYRINGE INTRAVENOUS
Status: CANCELLED | OUTPATIENT
Start: 2022-05-31

## 2022-05-12 RX ORDER — METHYLPREDNISOLONE SOD SUCC 125 MG
125 VIAL (EA) INJECTION ONCE AS NEEDED
Status: CANCELLED | OUTPATIENT
Start: 2022-05-31

## 2022-05-12 RX ORDER — EPINEPHRINE 0.3 MG/.3ML
0.3 INJECTION SUBCUTANEOUS ONCE AS NEEDED
Status: CANCELLED | OUTPATIENT
Start: 2022-05-31

## 2022-05-17 NOTE — PROGRESS NOTES
Subjective:       Patient ID: Shelby Meeks is a 42 y.o. female.    Chief Complaint: iron def anemia s/p bariatric surgery    HPI: Follow-up for iron def anemia and b12 deficiency s/p bariatric surgery    Last b12 injection 10/3/19 and 12/3/19- using sublinguinal B12 intermittently    Last visit with Dr. Evan Carroll on 5/28/18-    42 year-old  lady had a gastric band in the year 2001 for weight loss and it was reversed in 2006.  She ended up having a gastric bypass in 2015.     Patient had partial hyst with unil ooph 6/10/19     She had  been taking oral iron for several months prior to the initial visit with Dr. Carroll  without any significant improvement.  Low iron and b12 levels were noted and she was given IV iron and started on supplemental b12 injections    Last IV iron was injectafer 3/23/18 and 4/2/18 and 10/21/19 and 10/28/19    Pt had gallblader removed in August 2018  Elevated liver enzymes noted since July 2018 on [facility] and [facility]  Pt had CT of abd/pelvis with contrast 2019 year that was wnl per pt report     6/22/18 abdominal ultrasound revealed fatty liver    Negative hep b and c labs 11/2018 at [facility]    Pt has ulrasound of her abd yearly and fibroscan with Dr. Roberts for fatty liver disease and history of elevated ALT.     Denies any bm changes- taking b12 in multivitamin with iron. Not taking potassium- not taking bariatric vitamins    Review of Systems   Constitutional: Positive for fatigue. Negative for appetite change, fever and unexpected weight change.   HENT: Negative.    Eyes: Negative.  Negative for visual disturbance.   Respiratory: Positive for shortness of breath (intermittent). Negative for cough.    Cardiovascular: Negative.  Negative for chest pain.   Gastrointestinal: Positive for nausea (chronic intermittent since gastric bypass). Negative for abdominal pain, anal bleeding, blood in stool, constipation and diarrhea.        No reflux   Endocrine: Negative.     Genitourinary: Negative.  Negative for frequency.   Musculoskeletal: Negative.  Negative for back pain.   Skin: Negative.  Negative for rash.   Allergic/Immunologic: Negative.    Neurological: Negative.    Hematological: Negative.  Negative for adenopathy.   Psychiatric/Behavioral: Negative.  The patient is not nervous/anxious.    Pt admits to drinking only diet soda- no water during the day    Objective:      Physical Exam  Constitutional:       Appearance: Normal appearance. She is well-developed.   HENT:      Head: Normocephalic and atraumatic.      Right Ear: External ear normal.      Left Ear: External ear normal.      Mouth/Throat:      Pharynx: No oropharyngeal exudate.   Eyes:      General: No scleral icterus.        Right eye: No discharge.         Left eye: No discharge.      Conjunctiva/sclera: Conjunctivae normal.      Pupils: Pupils are equal, round, and reactive to light.   Neck:      Thyroid: No thyromegaly.   Cardiovascular:      Rate and Rhythm: Normal rate and regular rhythm.      Heart sounds: Normal heart sounds.   Pulmonary:      Effort: Pulmonary effort is normal.      Breath sounds: Normal breath sounds.   Chest:   Breasts:      Right: No axillary adenopathy or supraclavicular adenopathy.      Left: No axillary adenopathy or supraclavicular adenopathy.       Abdominal:      General: Bowel sounds are normal.      Palpations: Abdomen is soft.   Musculoskeletal:         General: Normal range of motion.      Right shoulder: No crepitus. Normal strength.      Cervical back: Normal range of motion and neck supple.   Lymphadenopathy:      Head:      Right side of head: No submental, submandibular, tonsillar, preauricular, posterior auricular or occipital adenopathy.      Left side of head: No submental, submandibular, tonsillar, preauricular, posterior auricular or occipital adenopathy.      Cervical: No cervical adenopathy.      Right cervical: No superficial or posterior cervical adenopathy.      Left cervical: No superficial or posterior cervical adenopathy.      Upper Body:      Right upper body: No supraclavicular or axillary adenopathy.      Left upper body: No supraclavicular or axillary adenopathy.   Skin:     General: Skin is warm and dry.      Coloration: Skin is not pale.      Findings: No erythema or rash.   Neurological:      Mental Status: She is alert and oriented to person, place, and time.      Deep Tendon Reflexes: Reflexes are normal and symmetric.   Psychiatric:         Behavior: Behavior normal.         Thought Content: Thought content normal.         Judgment: Judgment normal.           LABS:    Latest Reference Range & Units 11/29/21 15:55 05/11/22 15:35   WBC 3.90 - 12.70 K/uL 8.01 6.08   RBC 4.00 - 5.40 M/uL 4.14 3.91 (L)   Hemoglobin 12.0 - 16.0 g/dL 12.7 12.0   Hematocrit 37.0 - 48.5 % 40.1 37.8   MCV 82 - 98 fL 97 97   MCH 27.0 - 31.0 pg 30.7 30.7   MCHC 32.0 - 36.0 g/dL 31.7 (L) 31.7 (L)   RDW 11.5 - 14.5 % 12.9 13.7   Platelets 150 - 450 K/uL 321 293   MPV 9.2 - 12.9 fL 9.8 9.6   Gran % 38.0 - 73.0 % 63.8 47.8   Lymph % 18.0 - 48.0 % 30.1 38.7   Mono % 4.0 - 15.0 % 5.1 10.9   Eosinophil % 0.0 - 8.0 % 0.1 2.0   Basophil % 0.0 - 1.9 % 0.5 0.3   Immature Granulocytes 0.0 - 0.5 % 0.4 0.3   Gran # (ANC) 1.8 - 7.7 K/uL 5.1 2.9   Lymph # 1.0 - 4.8 K/uL 2.4 2.4   Mono # 0.3 - 1.0 K/uL 0.4 0.7   Eos # 0.0 - 0.5 K/uL 0.0 0.1   Baso # 0.00 - 0.20 K/uL 0.04 0.02   Immature Grans (Abs) 0.00 - 0.04 K/uL 0.03 [1] 0.02 [2]   NRBC 0 /100 WBC 0 0   Differential Method  Automated Automated   Iron 30 - 160 ug/dL 62 30   TIBC 250 - 450 ug/dL 407 406   Saturated Iron 20 - 50 % 15 (L) 7 (L)   Transferrin 200 - 375 mg/dL 275 274   Ferritin 20.0 - 300.0 ng/mL 40 17 (L)      [facility] Reference Range & Units 11/29/21 15:55 05/11/22 15:35   Sodium 136 - 145 mmol/L 139 141   Potassium 3.5 - 5.1 mmol/L 3.4 (L) 3.8   Chloride 95 - 110 mmol/L 109 110   CO2 23 - 29 mmol/L 20 (L) 22 (L)   Anion Gap 8 - 16 mmol/L 10  9   BUN 6 - 20 mg/dL 10 9   Creatinine 0.5 - 1.4 mg/dL 0.7 0.7   EGFR if non African American >60 mL/min/1.73 m^2 >60 [1] >60 [2]   EGFR if African American >60 mL/min/1.73 m^2 >60 >60   Glucose 70 - 110 mg/dL 128 (H) 161 (H)   Calcium 8.7 - 10.5 mg/dL 9.2 9.0   Alkaline Phosphatase 55 - 135 U/L 59 73   PROTEIN TOTAL 6.0 - 8.4 g/dL 6.6 6.4   Albumin 3.5 - 5.2 g/dL 3.7 3.4 (L)   BILIRUBIN TOTAL 0.1 - 1.0 mg/dL 0.2 [3] 0.2 [4]   AST 10 - 40 U/L 72 (H) 27   ALT 10 - 44 U/L 102 (H) 44     hgb normal  Overall iron normal  Iron sat declined-7  Liver enzymes normal  Ferritin low- 17      Assessment:       1. Other iron deficiency anemias    2. Other vitamin B12 deficiency anemias    3. S/P bariatric surgery    4. CFS (chronic fatigue syndrome)        Plan:     1.      Last IV iron 10/21/19 and 10/28/19- recommend IV feraheme again  2.      Hgb 12.0 normal- plts normal- workup of elevated platelets in the past negative for BRYANNA mutation,   3.      B12 level was normal July 2021- -      Iron levels low- saturation 7 - not using slow fe - taking multivitamin only. Ferritin 17   IV iron recommended- Feraheme X 2 doses- Rtc in 2 mons with cbc, cmp,  iron, ferritin and tibc b12, - will do bariatric panel Sept 2022- B1, b6, vit d, selinium, vit A and vit K one week prior to visit with me for review- taking multivitamin bid  4.       Encouraged eating iron rich foods- call for any weakness or fatigue out of the ordinary. Encouraged to eat potassium rich foods- encouraged bariatric fusion vitamins and iron supplement  5.       Elevated ALT-  hx of elevation in the past- normal now- has history of  fatty liver disease followed by Dr. Roberts -   Hep B and C labs at [facility] -11/2018 - negative   CT of abd and pelvis with contrast wnl 7/26/18   Hida scan normal 7/9/18   fibrascan done Nov 2021- followed by Dr. Roberts   March 2022 - upper endoscopy for abd pain and nausea- had yeast infection treated with diflucan

## 2022-05-31 ENCOUNTER — OFFICE VISIT (OUTPATIENT)
Dept: HEMATOLOGY/ONCOLOGY | Facility: CLINIC | Age: 42
End: 2022-05-31
Payer: COMMERCIAL

## 2022-05-31 ENCOUNTER — INFUSION (OUTPATIENT)
Dept: INFUSION THERAPY | Facility: HOSPITAL | Age: 42
End: 2022-05-31
Attending: NURSE PRACTITIONER
Payer: COMMERCIAL

## 2022-05-31 VITALS
DIASTOLIC BLOOD PRESSURE: 66 MMHG | OXYGEN SATURATION: 96 % | WEIGHT: 197.75 LBS | RESPIRATION RATE: 18 BRPM | SYSTOLIC BLOOD PRESSURE: 94 MMHG | HEART RATE: 82 BPM | HEIGHT: 65 IN | BODY MASS INDEX: 32.95 KG/M2 | TEMPERATURE: 97 F

## 2022-05-31 VITALS
DIASTOLIC BLOOD PRESSURE: 70 MMHG | HEIGHT: 65 IN | BODY MASS INDEX: 33.02 KG/M2 | SYSTOLIC BLOOD PRESSURE: 106 MMHG | TEMPERATURE: 97 F | WEIGHT: 198.19 LBS | HEART RATE: 85 BPM | OXYGEN SATURATION: 99 % | RESPIRATION RATE: 18 BRPM

## 2022-05-31 DIAGNOSIS — D51.8 OTHER VITAMIN B12 DEFICIENCY ANEMIAS: Primary | ICD-10-CM

## 2022-05-31 DIAGNOSIS — D50.8 OTHER IRON DEFICIENCY ANEMIAS: Primary | ICD-10-CM

## 2022-05-31 DIAGNOSIS — G93.32 CFS (CHRONIC FATIGUE SYNDROME): ICD-10-CM

## 2022-05-31 DIAGNOSIS — Z98.84 S/P BARIATRIC SURGERY: ICD-10-CM

## 2022-05-31 DIAGNOSIS — D51.8 OTHER VITAMIN B12 DEFICIENCY ANEMIAS: ICD-10-CM

## 2022-05-31 DIAGNOSIS — D50.8 OTHER IRON DEFICIENCY ANEMIAS: ICD-10-CM

## 2022-05-31 PROCEDURE — 99999 PR PBB SHADOW E&M-EST. PATIENT-LVL V: CPT | Mod: PBBFAC,,, | Performed by: NURSE PRACTITIONER

## 2022-05-31 PROCEDURE — 3008F BODY MASS INDEX DOCD: CPT | Mod: CPTII,S$GLB,, | Performed by: NURSE PRACTITIONER

## 2022-05-31 PROCEDURE — 3074F PR MOST RECENT SYSTOLIC BLOOD PRESSURE < 130 MM HG: ICD-10-PCS | Mod: CPTII,S$GLB,, | Performed by: NURSE PRACTITIONER

## 2022-05-31 PROCEDURE — 25000003 PHARM REV CODE 250: Performed by: NURSE PRACTITIONER

## 2022-05-31 PROCEDURE — 99214 OFFICE O/P EST MOD 30 MIN: CPT | Mod: 25,S$GLB,, | Performed by: NURSE PRACTITIONER

## 2022-05-31 PROCEDURE — 1159F MED LIST DOCD IN RCRD: CPT | Mod: CPTII,S$GLB,, | Performed by: NURSE PRACTITIONER

## 2022-05-31 PROCEDURE — 1160F RVW MEDS BY RX/DR IN RCRD: CPT | Mod: CPTII,S$GLB,, | Performed by: NURSE PRACTITIONER

## 2022-05-31 PROCEDURE — 99999 PR PBB SHADOW E&M-EST. PATIENT-LVL V: ICD-10-PCS | Mod: PBBFAC,,, | Performed by: NURSE PRACTITIONER

## 2022-05-31 PROCEDURE — 4010F ACE/ARB THERAPY RXD/TAKEN: CPT | Mod: CPTII,S$GLB,, | Performed by: NURSE PRACTITIONER

## 2022-05-31 PROCEDURE — 4010F PR ACE/ARB THEARPY RXD/TAKEN: ICD-10-PCS | Mod: CPTII,S$GLB,, | Performed by: NURSE PRACTITIONER

## 2022-05-31 PROCEDURE — 96375 TX/PRO/DX INJ NEW DRUG ADDON: CPT

## 2022-05-31 PROCEDURE — 1160F PR REVIEW ALL MEDS BY PRESCRIBER/CLIN PHARMACIST DOCUMENTED: ICD-10-PCS | Mod: CPTII,S$GLB,, | Performed by: NURSE PRACTITIONER

## 2022-05-31 PROCEDURE — 3078F DIAST BP <80 MM HG: CPT | Mod: CPTII,S$GLB,, | Performed by: NURSE PRACTITIONER

## 2022-05-31 PROCEDURE — 3078F PR MOST RECENT DIASTOLIC BLOOD PRESSURE < 80 MM HG: ICD-10-PCS | Mod: CPTII,S$GLB,, | Performed by: NURSE PRACTITIONER

## 2022-05-31 PROCEDURE — 96365 THER/PROPH/DIAG IV INF INIT: CPT

## 2022-05-31 PROCEDURE — 63600175 PHARM REV CODE 636 W HCPCS: Performed by: NURSE PRACTITIONER

## 2022-05-31 PROCEDURE — 3074F SYST BP LT 130 MM HG: CPT | Mod: CPTII,S$GLB,, | Performed by: NURSE PRACTITIONER

## 2022-05-31 PROCEDURE — 1159F PR MEDICATION LIST DOCUMENTED IN MEDICAL RECORD: ICD-10-PCS | Mod: CPTII,S$GLB,, | Performed by: NURSE PRACTITIONER

## 2022-05-31 PROCEDURE — 3008F PR BODY MASS INDEX (BMI) DOCUMENTED: ICD-10-PCS | Mod: CPTII,S$GLB,, | Performed by: NURSE PRACTITIONER

## 2022-05-31 PROCEDURE — 99214 PR OFFICE/OUTPT VISIT, EST, LEVL IV, 30-39 MIN: ICD-10-PCS | Mod: 25,S$GLB,, | Performed by: NURSE PRACTITIONER

## 2022-05-31 RX ORDER — SODIUM CHLORIDE 0.9 % (FLUSH) 0.9 %
10 SYRINGE (ML) INJECTION
Status: CANCELLED | OUTPATIENT
Start: 2022-06-07

## 2022-05-31 RX ORDER — EPINEPHRINE 0.3 MG/.3ML
0.3 INJECTION SUBCUTANEOUS ONCE AS NEEDED
Status: CANCELLED | OUTPATIENT
Start: 2022-06-07

## 2022-05-31 RX ORDER — METHYLPREDNISOLONE SOD SUCC 125 MG
125 VIAL (EA) INJECTION
Status: COMPLETED | OUTPATIENT
Start: 2022-05-31 | End: 2022-05-31

## 2022-05-31 RX ORDER — HEPARIN 100 UNIT/ML
500 SYRINGE INTRAVENOUS
Status: CANCELLED | OUTPATIENT
Start: 2022-06-07

## 2022-05-31 RX ORDER — DIPHENHYDRAMINE HYDROCHLORIDE 50 MG/ML
50 INJECTION INTRAMUSCULAR; INTRAVENOUS ONCE AS NEEDED
Status: CANCELLED | OUTPATIENT
Start: 2022-06-07

## 2022-05-31 RX ORDER — SODIUM CHLORIDE 0.9 % (FLUSH) 0.9 %
10 SYRINGE (ML) INJECTION
Status: DISCONTINUED | OUTPATIENT
Start: 2022-05-31 | End: 2022-05-31 | Stop reason: HOSPADM

## 2022-05-31 RX ORDER — METHYLPREDNISOLONE SOD SUCC 125 MG
125 VIAL (EA) INJECTION ONCE AS NEEDED
Status: CANCELLED | OUTPATIENT
Start: 2022-06-07

## 2022-05-31 RX ORDER — METHYLPREDNISOLONE SOD SUCC 125 MG
125 VIAL (EA) INJECTION
Status: CANCELLED
Start: 2022-06-07

## 2022-05-31 RX ADMIN — METHYLPREDNISOLONE SODIUM SUCCINATE 125 MG: 125 INJECTION, POWDER, FOR SOLUTION INTRAMUSCULAR; INTRAVENOUS at 08:05

## 2022-05-31 RX ADMIN — SODIUM CHLORIDE: 9 INJECTION, SOLUTION INTRAVENOUS at 08:05

## 2022-05-31 RX ADMIN — FERUMOXYTOL 510 MG: 510 INJECTION INTRAVENOUS at 08:05

## 2022-05-31 NOTE — PLAN OF CARE
Discussed plan of care with pt. Addressed any and ongoing concerns. Pt denies   Problem: Adult Inpatient Plan of Care  Goal: Plan of Care Review  Outcome: Ongoing, Progressing  Goal: Patient-Specific Goal (Individualized)  Outcome: Ongoing, Progressing  Goal: Absence of Hospital-Acquired Illness or Injury  Outcome: Ongoing, Progressing  Intervention: Identify and Manage Fall Risk  Flowsheets (Taken 5/31/2022 0837)  Safety Promotion/Fall Prevention: in recliner, wheels locked  Goal: Optimal Comfort and Wellbeing  Outcome: Ongoing, Progressing  Intervention: Provide Person-Centered Care  Flowsheets (Taken 5/31/2022 0837)  Trust Relationship/Rapport:   care explained   choices provided   emotional support provided   empathic listening provided   questions answered   questions encouraged   reassurance provided   thoughts/feelings acknowledged

## 2022-05-31 NOTE — PLAN OF CARE
Discussed plan of care with pt. Addressed any and ongoing concerns. Pt denies   Problem: Adult Inpatient Plan of Care  Goal: Plan of Care Review  5/31/2022 1359 by Kim Duque RN  Outcome: Ongoing, Progressing  5/31/2022 0837 by Kim Duque RN  Outcome: Ongoing, Progressing  Goal: Patient-Specific Goal (Individualized)  5/31/2022 1359 by Kim Duque RN  Outcome: Ongoing, Progressing  5/31/2022 0837 by Kim Duque RN  Outcome: Ongoing, Progressing  Goal: Absence of Hospital-Acquired Illness or Injury  5/31/2022 1359 by Kim Duque RN  Outcome: Ongoing, Progressing  5/31/2022 0837 by Kim Duque RN  Outcome: Ongoing, Progressing  Intervention: Identify and Manage Fall Risk  5/31/2022 1359 by Kim Duque RN  Flowsheets (Taken 5/31/2022 1359)  Safety Promotion/Fall Prevention: in recliner, wheels locked  5/31/2022 0837 by Kim Duque RN  Flowsheets (Taken 5/31/2022 0837)  Safety Promotion/Fall Prevention: in recliner, wheels locked  Intervention: Prevent Infection  Flowsheets (Taken 5/31/2022 1359)  Infection Prevention:   hand hygiene promoted   personal protective equipment utilized  Goal: Optimal Comfort and Wellbeing  5/31/2022 1359 by Kim Duque RN  Outcome: Ongoing, Progressing  5/31/2022 0837 by Kim Duque RN  Outcome: Ongoing, Progressing  Intervention: Provide Person-Centered Care  5/31/2022 1359 by Kim Duque RN  Flowsheets (Taken 5/31/2022 1359)  Trust Relationship/Rapport:   care explained   choices provided   emotional support provided   empathic listening provided   questions answered   questions encouraged   reassurance provided   thoughts/feelings acknowledged  5/31/2022 0837 by Kim Duque RN  Flowsheets (Taken 5/31/2022 0837)  Trust Relationship/Rapport:   care explained   choices provided   emotional support provided   empathic listening provided   questions answered   questions encouraged    reassurance provided   thoughts/feelings acknowledged

## 2022-05-31 NOTE — NURSING
Feraheme dose 1 of 2    Pre-medicated with solumedrol 125mg IVP. Pt tolerated treatment well and was d/c to home after 45 min wait per clinic protocol .

## 2022-06-07 ENCOUNTER — INFUSION (OUTPATIENT)
Dept: INFUSION THERAPY | Facility: HOSPITAL | Age: 42
End: 2022-06-07
Attending: NURSE PRACTITIONER
Payer: COMMERCIAL

## 2022-06-07 VITALS
TEMPERATURE: 98 F | OXYGEN SATURATION: 98 % | HEART RATE: 80 BPM | RESPIRATION RATE: 18 BRPM | SYSTOLIC BLOOD PRESSURE: 92 MMHG | DIASTOLIC BLOOD PRESSURE: 59 MMHG

## 2022-06-07 DIAGNOSIS — D51.8 OTHER VITAMIN B12 DEFICIENCY ANEMIAS: Primary | ICD-10-CM

## 2022-06-07 DIAGNOSIS — D50.8 OTHER IRON DEFICIENCY ANEMIAS: ICD-10-CM

## 2022-06-07 DIAGNOSIS — Z98.84 S/P BARIATRIC SURGERY: ICD-10-CM

## 2022-06-07 PROCEDURE — 63600175 PHARM REV CODE 636 W HCPCS: Performed by: NURSE PRACTITIONER

## 2022-06-07 PROCEDURE — 25000003 PHARM REV CODE 250: Performed by: NURSE PRACTITIONER

## 2022-06-07 PROCEDURE — 96375 TX/PRO/DX INJ NEW DRUG ADDON: CPT

## 2022-06-07 PROCEDURE — 96365 THER/PROPH/DIAG IV INF INIT: CPT

## 2022-06-07 RX ORDER — METHYLPREDNISOLONE SOD SUCC 125 MG
125 VIAL (EA) INJECTION
Status: COMPLETED | OUTPATIENT
Start: 2022-06-07 | End: 2022-06-07

## 2022-06-07 RX ORDER — METHYLPREDNISOLONE SOD SUCC 125 MG
125 VIAL (EA) INJECTION ONCE AS NEEDED
Status: CANCELLED | OUTPATIENT
Start: 2022-06-07

## 2022-06-07 RX ORDER — HEPARIN 100 UNIT/ML
500 SYRINGE INTRAVENOUS
Status: CANCELLED | OUTPATIENT
Start: 2022-06-07

## 2022-06-07 RX ORDER — METHYLPREDNISOLONE SOD SUCC 125 MG
125 VIAL (EA) INJECTION
Status: CANCELLED
Start: 2022-06-07

## 2022-06-07 RX ORDER — EPINEPHRINE 0.3 MG/.3ML
0.3 INJECTION SUBCUTANEOUS ONCE AS NEEDED
Status: CANCELLED | OUTPATIENT
Start: 2022-06-07

## 2022-06-07 RX ORDER — SODIUM CHLORIDE 0.9 % (FLUSH) 0.9 %
10 SYRINGE (ML) INJECTION
Status: CANCELLED | OUTPATIENT
Start: 2022-06-07

## 2022-06-07 RX ORDER — DIPHENHYDRAMINE HYDROCHLORIDE 50 MG/ML
50 INJECTION INTRAMUSCULAR; INTRAVENOUS ONCE AS NEEDED
Status: CANCELLED | OUTPATIENT
Start: 2022-06-07

## 2022-06-07 RX ADMIN — SODIUM CHLORIDE: 9 INJECTION, SOLUTION INTRAVENOUS at 09:06

## 2022-06-07 RX ADMIN — METHYLPREDNISOLONE SODIUM SUCCINATE 125 MG: 125 INJECTION, POWDER, FOR SOLUTION INTRAMUSCULAR; INTRAVENOUS at 09:06

## 2022-06-07 RX ADMIN — FERUMOXYTOL 510 MG: 510 INJECTION INTRAVENOUS at 09:06

## 2022-06-07 NOTE — PLAN OF CARE
Discussed plan of care with pt. Addressed any and ongoing concerns. Pt denies    Problem: Adult Inpatient Plan of Care  Goal: Plan of Care Review  Outcome: Ongoing, Progressing  Flowsheets (Taken 6/7/2022 0912)  Plan of Care Reviewed With: patient  Goal: Patient-Specific Goal (Individualized)  Outcome: Ongoing, Progressing  Goal: Absence of Hospital-Acquired Illness or Injury  Outcome: Ongoing, Progressing  Intervention: Identify and Manage Fall Risk  Flowsheets (Taken 6/7/2022 0912)  Safety Promotion/Fall Prevention:   in recliner, wheels locked   room near unit station  Intervention: Prevent Infection  Flowsheets (Taken 6/7/2022 0912)  Infection Prevention:   hand hygiene promoted   equipment surfaces disinfected  Goal: Optimal Comfort and Wellbeing  Outcome: Ongoing, Progressing  Intervention: Monitor Pain and Promote Comfort  Flowsheets (Taken 6/7/2022 0912)  Pain Management Interventions: quiet environment facilitated  Intervention: Provide Person-Centered Care  Flowsheets (Taken 6/7/2022 0912)  Trust Relationship/Rapport:   care explained   reassurance provided   thoughts/feelings acknowledged   choices provided   emotional support provided   empathic listening provided   questions answered   questions encouraged

## 2022-06-13 ENCOUNTER — DOCUMENTATION ONLY (OUTPATIENT)
Dept: HEMATOLOGY/ONCOLOGY | Facility: CLINIC | Age: 42
End: 2022-06-13
Payer: COMMERCIAL

## 2022-06-13 ENCOUNTER — PATIENT MESSAGE (OUTPATIENT)
Dept: HEMATOLOGY/ONCOLOGY | Facility: CLINIC | Age: 42
End: 2022-06-13
Payer: COMMERCIAL

## 2022-06-13 NOTE — PROGRESS NOTES
Called pt to see if she heard from GI doctor- waiting for call back. Pt states pain has eased a little right now. Will keep me posted.

## 2022-06-14 ENCOUNTER — DOCUMENTATION ONLY (OUTPATIENT)
Dept: SURGERY | Facility: CLINIC | Age: 42
End: 2022-06-14
Payer: COMMERCIAL

## 2022-06-14 NOTE — PROGRESS NOTES
Called to check on pt- voice mail- left message for pt to call me or message me to let me know how she is doing today.

## 2022-07-15 ENCOUNTER — LAB VISIT (OUTPATIENT)
Dept: LAB | Facility: HOSPITAL | Age: 42
End: 2022-07-15
Attending: NURSE PRACTITIONER
Payer: COMMERCIAL

## 2022-07-15 DIAGNOSIS — Z98.84 S/P BARIATRIC SURGERY: ICD-10-CM

## 2022-07-15 DIAGNOSIS — D51.8 OTHER VITAMIN B12 DEFICIENCY ANEMIAS: ICD-10-CM

## 2022-07-15 DIAGNOSIS — D50.8 OTHER IRON DEFICIENCY ANEMIAS: ICD-10-CM

## 2022-07-15 DIAGNOSIS — G93.32 CFS (CHRONIC FATIGUE SYNDROME): ICD-10-CM

## 2022-07-15 LAB
ALBUMIN SERPL BCP-MCNC: 3.9 G/DL (ref 3.5–5.2)
ALP SERPL-CCNC: 73 U/L (ref 55–135)
ALT SERPL W/O P-5'-P-CCNC: 54 U/L (ref 10–44)
ANION GAP SERPL CALC-SCNC: 10 MMOL/L (ref 8–16)
AST SERPL-CCNC: 49 U/L (ref 10–40)
BASOPHILS # BLD AUTO: 0.08 K/UL (ref 0–0.2)
BASOPHILS NFR BLD: 1 % (ref 0–1.9)
BILIRUB SERPL-MCNC: 0.2 MG/DL (ref 0.1–1)
BUN SERPL-MCNC: 10 MG/DL (ref 6–20)
CALCIUM SERPL-MCNC: 9.7 MG/DL (ref 8.7–10.5)
CHLORIDE SERPL-SCNC: 110 MMOL/L (ref 95–110)
CO2 SERPL-SCNC: 20 MMOL/L (ref 23–29)
CREAT SERPL-MCNC: 0.7 MG/DL (ref 0.5–1.4)
DIFFERENTIAL METHOD: ABNORMAL
EOSINOPHIL # BLD AUTO: 0.2 K/UL (ref 0–0.5)
EOSINOPHIL NFR BLD: 2.9 % (ref 0–8)
ERYTHROCYTE [DISTWIDTH] IN BLOOD BY AUTOMATED COUNT: 13.9 % (ref 11.5–14.5)
EST. GFR  (AFRICAN AMERICAN): >60 ML/MIN/1.73 M^2
EST. GFR  (NON AFRICAN AMERICAN): >60 ML/MIN/1.73 M^2
FERRITIN SERPL-MCNC: 368 NG/ML (ref 20–300)
GLUCOSE SERPL-MCNC: 113 MG/DL (ref 70–110)
HCT VFR BLD AUTO: 45.1 % (ref 37–48.5)
HGB BLD-MCNC: 14.6 G/DL (ref 12–16)
IMM GRANULOCYTES # BLD AUTO: 0.04 K/UL (ref 0–0.04)
IMM GRANULOCYTES NFR BLD AUTO: 0.5 % (ref 0–0.5)
IRON SERPL-MCNC: 127 UG/DL (ref 30–160)
LYMPHOCYTES # BLD AUTO: 2.5 K/UL (ref 1–4.8)
LYMPHOCYTES NFR BLD: 31 % (ref 18–48)
MCH RBC QN AUTO: 31.4 PG (ref 27–31)
MCHC RBC AUTO-ENTMCNC: 32.4 G/DL (ref 32–36)
MCV RBC AUTO: 97 FL (ref 82–98)
MONOCYTES # BLD AUTO: 0.6 K/UL (ref 0.3–1)
MONOCYTES NFR BLD: 7.6 % (ref 4–15)
NEUTROPHILS # BLD AUTO: 4.6 K/UL (ref 1.8–7.7)
NEUTROPHILS NFR BLD: 57 % (ref 38–73)
NRBC BLD-RTO: 0 /100 WBC
PLATELET # BLD AUTO: 414 K/UL (ref 150–450)
PMV BLD AUTO: 10 FL (ref 9.2–12.9)
POTASSIUM SERPL-SCNC: 4.2 MMOL/L (ref 3.5–5.1)
PROT SERPL-MCNC: 7.5 G/DL (ref 6–8.4)
RBC # BLD AUTO: 4.65 M/UL (ref 4–5.4)
SATURATED IRON: 36 % (ref 20–50)
SODIUM SERPL-SCNC: 140 MMOL/L (ref 136–145)
TOTAL IRON BINDING CAPACITY: 351 UG/DL (ref 250–450)
TRANSFERRIN SERPL-MCNC: 237 MG/DL (ref 200–375)
VIT B12 SERPL-MCNC: 524 PG/ML (ref 210–950)
WBC # BLD AUTO: 8.06 K/UL (ref 3.9–12.7)

## 2022-07-15 PROCEDURE — 84466 ASSAY OF TRANSFERRIN: CPT | Performed by: NURSE PRACTITIONER

## 2022-07-15 PROCEDURE — 36415 COLL VENOUS BLD VENIPUNCTURE: CPT | Mod: PO | Performed by: NURSE PRACTITIONER

## 2022-07-15 PROCEDURE — 85025 COMPLETE CBC W/AUTO DIFF WBC: CPT | Performed by: NURSE PRACTITIONER

## 2022-07-15 PROCEDURE — 82607 VITAMIN B-12: CPT | Performed by: NURSE PRACTITIONER

## 2022-07-15 PROCEDURE — 82728 ASSAY OF FERRITIN: CPT | Performed by: NURSE PRACTITIONER

## 2022-07-15 PROCEDURE — 80053 COMPREHEN METABOLIC PANEL: CPT | Performed by: NURSE PRACTITIONER

## 2022-08-29 ENCOUNTER — OFFICE VISIT (OUTPATIENT)
Dept: RHEUMATOLOGY | Facility: CLINIC | Age: 42
End: 2022-08-29
Payer: COMMERCIAL

## 2022-08-29 DIAGNOSIS — M79.7 FIBROMYALGIA: ICD-10-CM

## 2022-08-29 DIAGNOSIS — G93.32 CFS (CHRONIC FATIGUE SYNDROME): ICD-10-CM

## 2022-08-29 DIAGNOSIS — M35.09 SJOGREN'S SYNDROME WITH OTHER ORGAN INVOLVEMENT: Primary | ICD-10-CM

## 2022-08-29 PROCEDURE — 1160F RVW MEDS BY RX/DR IN RCRD: CPT | Mod: CPTII,95,, | Performed by: INTERNAL MEDICINE

## 2022-08-29 PROCEDURE — 1159F PR MEDICATION LIST DOCUMENTED IN MEDICAL RECORD: ICD-10-PCS | Mod: CPTII,95,, | Performed by: INTERNAL MEDICINE

## 2022-08-29 PROCEDURE — 1160F PR REVIEW ALL MEDS BY PRESCRIBER/CLIN PHARMACIST DOCUMENTED: ICD-10-PCS | Mod: CPTII,95,, | Performed by: INTERNAL MEDICINE

## 2022-08-29 PROCEDURE — 4010F PR ACE/ARB THEARPY RXD/TAKEN: ICD-10-PCS | Mod: CPTII,95,, | Performed by: INTERNAL MEDICINE

## 2022-08-29 PROCEDURE — 99214 PR OFFICE/OUTPT VISIT, EST, LEVL IV, 30-39 MIN: ICD-10-PCS | Mod: 95,,, | Performed by: INTERNAL MEDICINE

## 2022-08-29 PROCEDURE — 4010F ACE/ARB THERAPY RXD/TAKEN: CPT | Mod: CPTII,95,, | Performed by: INTERNAL MEDICINE

## 2022-08-29 PROCEDURE — 99214 OFFICE O/P EST MOD 30 MIN: CPT | Mod: 95,,, | Performed by: INTERNAL MEDICINE

## 2022-08-29 PROCEDURE — 1159F MED LIST DOCD IN RCRD: CPT | Mod: CPTII,95,, | Performed by: INTERNAL MEDICINE

## 2022-08-29 NOTE — PROGRESS NOTES
RHEUMATOLOGY FOLLOW UP - TELE VISIT     The patient location is: [place]  The chief complaint leading to consultation is:  Follow-up for fibromyalgia and Sjogren's.  Visit type: Virtual visit with synchronous audio and video  Total time spent with patient: 12 mins  Each patient to whom he or she provides medical services by telemedicine is:  (1) informed of the relationship between the physician and patient and the respective role of any other health care provider with respect to management of the patient; and (2) notified that he or she may decline to receive medical services by telemedicine and may withdraw from such care at any time.    Chief complaints, HPI, ROS, EXAM, Assessment & Plans:-  Shelby Mena a 42 y.o. pleasant female seen today for follow-up visit.  She reports doing well today.  No significant worsening pain or dry mouth.  Combination of low-dose naltrexone Lyrica have been helping with her fibromyalgia.  On Evoxac for dry mouth after failing pilocarpine.  No significant dry mouth but her dentist have recommended dental extraction for cavities and other dental issues.    No parotid swelling.  No systemic B symptoms.  Rheumatological review of system negative otherwise.  Physical examination shows no parotid swelling.  No synovitis..    1. Sjogren's syndrome with other organ involvement    2. CFS (chronic fatigue syndrome)    3. Fibromyalgia      Problem List Items Addressed This Visit       Sjogren's disease - Primary    Fibromyalgia    CFS (chronic fatigue syndrome)       Stable sicca syndrome on cevimeline.  Continue the same.    Continue Lyrica and low-dose naltrexone for fibromyalgia.  # Follow up in about 1 year (around 8/29/2023).      Past Medical History:   Diagnosis Date    Anemia     Asthma     Childhood    Diabetes mellitus 2003    Fibromyalgia 2013    Heart disease     Hypertension 1996       Past Surgical History:   Procedure Laterality Date    CERVICAL DISC ARTHROPLASTY   2015    2 aritificial discs placed    GASTRIC BYPASS  2015    HYSTERECTOMY  2019    Lab band reversal  2014    LAPAROSCOPIC GASTRIC BANDING  2001    OOPHORECTOMY  2019    ROBOT-ASSISTED LAPAROSCOPIC HYSTERECTOMY      SINUS SURGERY  1996    SINUS SURGERY  2007    TONSILLECTOMY, ADENOIDECTOMY  1981        Social History     Tobacco Use    Smoking status: Former     Packs/day: 0.50     Years: 10.00     Pack years: 5.00     Types: Cigarettes     Quit date:      Years since quittin.6    Smokeless tobacco: Never   Substance Use Topics    Alcohol use: No    Drug use: No       Family History   Problem Relation Age of Onset    Cancer Mother     Hypertension Mother     Cancer Father     Asthma Paternal Uncle     Cancer Maternal Grandfather        Review of patient's allergies indicates:   Allergen Reactions    Amoxicillin-pot clavulanate     Codeine     Codeine phosphate     Nsaids (non-steroidal anti-inflammatory drug)        Medication List with Changes/Refills   Current Medications    ALPRAZOLAM (XANAX) 0.25 MG TABLET    alprazolam 0.25 mg tablet   Take 1 by mouth daily    BREO ELLIPTA 200-25 MCG/DOSE DSDV DISKUS INHALER    inhale 1 puff by inhalation route once daily at the same time each day    BUPROPION (WELLBUTRIN SR) 200 MG SR12    Take 200 mg by mouth once daily.    CETIRIZINE (ZYRTEC) 10 MG TABLET    Take 10 mg by mouth daily as needed.     CEVIMELINE (EVOXAC) 30 MG CAPSULE    Take 1 capsule (30 mg total) by mouth 3 (three) times daily.    DICYCLOMINE (BENTYL) 20 MG TABLET    Take 20 mg by mouth as needed.     EPINEPHRINE (EPIPEN) 0.3 MG/0.3 ML ATIN    inject 0.3 mg by intramuscular route once as needed for anaphylaxis    ESZOPICLONE (LUNESTA) 3 MG TAB    eszopiclone 3 mg tablet   Take 1 tablet by mouth at bedtime    FLOVENT  MCG/ACTUATION INHALER    INHALE TWO PUFFS BY MOUTH TWICE DAILY FOR ASTHMA MAINTENANCE    HYDROXYZINE PAMOATE (VISTARIL) 25 MG CAP    Take 1 capsule (25 mg total) by mouth  daily as needed (headache).    IPRATROPIUM (ATROVENT) 21 MCG (0.03 %) NASAL SPRAY    2 sprays by Nasal route 3 (three) times daily as needed for Rhinitis.    LINACLOTIDE (LINZESS) 145 MCG CAP CAPSULE    Linzess 145 mcg capsule   TAKE ONE CAPSULE BY MOUTH 30 MINUTES PRIOR TO A MEAL    LISINOPRIL 10 MG TABLET        METFORMIN (GLUCOPHAGE) 1000 MG TABLET    metformin 1,000 mg tablet   TAKE ONE TABLET BY MOUTH TWICE DAILY]    METOPROLOL SUCCINATE (TOPROL-XL) 25 MG 24 HR TABLET    Take 25 mg by mouth.    MONTELUKAST (SINGULAIR) 10 MG TABLET    Take 10 mg by mouth once daily.     NALTREXONE CAPSULE    Take 6 mg once at bedtime daily.    ONDANSETRON (ZOFRAN) 8 MG TABLET    Take 8 mg by mouth every 8 (eight) hours as needed.    PANTOPRAZOLE (PROTONIX) 40 MG TABLET    Take 40 mg by mouth 3 (three) times daily.    PREGABALIN (LYRICA) 75 MG CAPSULE    TAKE 1 CAPSULE BY MOUTH TWICE DAILY    PROAIR HFA 90 MCG/ACTUATION INHALER    INHALE TWO PUFFS UP TO FOUR TIMES DAILY IF NEEDED FOR QUICK RELIEF ONLY    PROPRANOLOL (INDERAL) 60 MG TABLET        RIMEGEPANT 75 MG ODT    Take 1 tablet (75 mg total) by mouth every 72 hours as needed for Migraine (do not exceed 2-3 doses within 1 week). Place ODT tablet on the tongue; alternatively the ODT tablet may be placed under the tongue    ROSUVASTATIN (CRESTOR) 10 MG TABLET    Take 10 mg by mouth once daily.    SITAGLIPTIN (JANUVIA) 100 MG TAB    Januvia 100 mg tablet   TAKE ONE TABLET BY MOUTH DAILY    SUCRALFATE (CARAFATE) 1 GRAM TABLET    Take 1 g by mouth 3 (three) times daily.    TIZANIDINE (ZANAFLEX) 4 MG TABLET    TAKE ONE TABLET BY MOUTH EVERY 8 HOURS AS NEEDED FOR PAIN    TOPIRAMATE (TOPAMAX) 100 MG TABLET    TAKE ONE TABLET BY MOUTH TWICE DAILY    VIIBRYD 40 MG TAB TABLET    Take 40 mg by mouth once daily.       Disclaimer: This note was prepared using voice recognition system and is likely to have sound alike errors and is not proof read.  Please call me with any questions.

## 2022-08-30 ENCOUNTER — PATIENT MESSAGE (OUTPATIENT)
Dept: RHEUMATOLOGY | Facility: CLINIC | Age: 42
End: 2022-08-30
Payer: COMMERCIAL

## 2022-09-06 ENCOUNTER — OFFICE VISIT (OUTPATIENT)
Dept: NEUROLOGY | Facility: CLINIC | Age: 42
End: 2022-09-06
Payer: COMMERCIAL

## 2022-09-06 VITALS
SYSTOLIC BLOOD PRESSURE: 106 MMHG | HEART RATE: 76 BPM | DIASTOLIC BLOOD PRESSURE: 71 MMHG | RESPIRATION RATE: 16 BRPM | HEIGHT: 65 IN | OXYGEN SATURATION: 98 % | WEIGHT: 202.81 LBS | BODY MASS INDEX: 33.79 KG/M2

## 2022-09-06 DIAGNOSIS — M79.7 FIBROMYALGIA: ICD-10-CM

## 2022-09-06 DIAGNOSIS — M35.00 SJOGREN'S SYNDROME, WITH UNSPECIFIED ORGAN INVOLVEMENT: ICD-10-CM

## 2022-09-06 DIAGNOSIS — E11.69 HYPERLIPIDEMIA ASSOCIATED WITH TYPE 2 DIABETES MELLITUS: ICD-10-CM

## 2022-09-06 DIAGNOSIS — Z98.1 S/P CERVICAL SPINAL FUSION: ICD-10-CM

## 2022-09-06 DIAGNOSIS — I15.2 HYPERTENSION ASSOCIATED WITH DIABETES: ICD-10-CM

## 2022-09-06 DIAGNOSIS — D50.8 OTHER IRON DEFICIENCY ANEMIAS: ICD-10-CM

## 2022-09-06 DIAGNOSIS — M79.10 MUSCLE PAIN: ICD-10-CM

## 2022-09-06 DIAGNOSIS — E11.59 HYPERTENSION ASSOCIATED WITH DIABETES: ICD-10-CM

## 2022-09-06 DIAGNOSIS — G43.009 MIGRAINE WITHOUT AURA AND WITHOUT STATUS MIGRAINOSUS, NOT INTRACTABLE: Primary | ICD-10-CM

## 2022-09-06 DIAGNOSIS — Z98.84 S/P BARIATRIC SURGERY: ICD-10-CM

## 2022-09-06 DIAGNOSIS — D51.8 OTHER VITAMIN B12 DEFICIENCY ANEMIAS: ICD-10-CM

## 2022-09-06 DIAGNOSIS — M15.9 PRIMARY OSTEOARTHRITIS INVOLVING MULTIPLE JOINTS: ICD-10-CM

## 2022-09-06 DIAGNOSIS — Z87.42 HISTORY OF ENDOMETRIOSIS: ICD-10-CM

## 2022-09-06 DIAGNOSIS — F32.A ANXIETY AND DEPRESSION: ICD-10-CM

## 2022-09-06 DIAGNOSIS — G93.32 CFS (CHRONIC FATIGUE SYNDROME): ICD-10-CM

## 2022-09-06 DIAGNOSIS — F41.9 ANXIETY AND DEPRESSION: ICD-10-CM

## 2022-09-06 DIAGNOSIS — Z90.710 HISTORY OF ROBOT-ASSISTED LAPAROSCOPIC HYSTERECTOMY: ICD-10-CM

## 2022-09-06 DIAGNOSIS — E78.5 HYPERLIPIDEMIA ASSOCIATED WITH TYPE 2 DIABETES MELLITUS: ICD-10-CM

## 2022-09-06 PROBLEM — M15.0 PRIMARY OSTEOARTHRITIS INVOLVING MULTIPLE JOINTS: Status: ACTIVE | Noted: 2019-03-06

## 2022-09-06 PROCEDURE — 99999 PR PBB SHADOW E&M-EST. PATIENT-LVL IV: ICD-10-PCS | Mod: PBBFAC,,, | Performed by: PSYCHIATRY & NEUROLOGY

## 2022-09-06 PROCEDURE — 3078F DIAST BP <80 MM HG: CPT | Mod: CPTII,S$GLB,, | Performed by: PSYCHIATRY & NEUROLOGY

## 2022-09-06 PROCEDURE — 3008F BODY MASS INDEX DOCD: CPT | Mod: CPTII,S$GLB,, | Performed by: PSYCHIATRY & NEUROLOGY

## 2022-09-06 PROCEDURE — 3074F SYST BP LT 130 MM HG: CPT | Mod: CPTII,S$GLB,, | Performed by: PSYCHIATRY & NEUROLOGY

## 2022-09-06 PROCEDURE — 3008F PR BODY MASS INDEX (BMI) DOCUMENTED: ICD-10-PCS | Mod: CPTII,S$GLB,, | Performed by: PSYCHIATRY & NEUROLOGY

## 2022-09-06 PROCEDURE — 3078F PR MOST RECENT DIASTOLIC BLOOD PRESSURE < 80 MM HG: ICD-10-PCS | Mod: CPTII,S$GLB,, | Performed by: PSYCHIATRY & NEUROLOGY

## 2022-09-06 PROCEDURE — 4010F ACE/ARB THERAPY RXD/TAKEN: CPT | Mod: CPTII,S$GLB,, | Performed by: PSYCHIATRY & NEUROLOGY

## 2022-09-06 PROCEDURE — 3074F PR MOST RECENT SYSTOLIC BLOOD PRESSURE < 130 MM HG: ICD-10-PCS | Mod: CPTII,S$GLB,, | Performed by: PSYCHIATRY & NEUROLOGY

## 2022-09-06 PROCEDURE — 99999 PR PBB SHADOW E&M-EST. PATIENT-LVL IV: CPT | Mod: PBBFAC,,, | Performed by: PSYCHIATRY & NEUROLOGY

## 2022-09-06 PROCEDURE — 4010F PR ACE/ARB THEARPY RXD/TAKEN: ICD-10-PCS | Mod: CPTII,S$GLB,, | Performed by: PSYCHIATRY & NEUROLOGY

## 2022-09-06 PROCEDURE — 99215 PR OFFICE/OUTPT VISIT, EST, LEVL V, 40-54 MIN: ICD-10-PCS | Mod: S$GLB,,, | Performed by: PSYCHIATRY & NEUROLOGY

## 2022-09-06 PROCEDURE — 99215 OFFICE O/P EST HI 40 MIN: CPT | Mod: S$GLB,,, | Performed by: PSYCHIATRY & NEUROLOGY

## 2022-09-06 RX ORDER — UBROGEPANT 100 MG/1
100 TABLET ORAL ONCE AS NEEDED
Qty: 9 TABLET | Refills: 3 | Status: SHIPPED | OUTPATIENT
Start: 2022-09-06 | End: 2022-09-06

## 2022-09-06 RX ORDER — AMITRIPTYLINE HYDROCHLORIDE 50 MG/1
50 TABLET, FILM COATED ORAL NIGHTLY
Qty: 30 TABLET | Refills: 5 | Status: SHIPPED | OUTPATIENT
Start: 2022-09-06 | End: 2022-11-01

## 2022-09-06 NOTE — PROGRESS NOTES
Subjective:       Patient ID: Shelby Meeks is a 42 y.o. female.    Chief Complaint: No chief complaint on file.          HPI      BACKGROUND HISTORY       Patient is a former Patient of Dr. Goodwin. The patient is here for evaluation of headaches.       The headaches started at age 12 and recurred at the age of 19. The headaches progress from the left side with throbbing nature, and also from the neck and the back of the head. No visual aura. No associated neurological deficits.  The headaches are 6-8/10 causing significant morbidity. The headache is associated with nausea and light, sound sensitivity. The headaches are daily and could last for hours and days. Migraines seem to have gotten worse after hysterectomy and unilateral oophorectomy. No TMJ problems.  The patient denies blurry vision and ear ringing. The headache is not positional or postural but worsens with movement and valsalva. Sleep help lessen the headache. No history of head trauma. No history of seizures. No history of smoking. No caffeine overuses. No vertigo. No blacking out.  No fever, chills or rigors. No history of significant memory loss. No history of strokes.  The patient cannot think of food that aggravates the headache.  Extreme heat and extreme cold aggravate the headaches. Neck pain exacerbate the headaches (She is S/P ACDF).  Family history is remarkable for migraine (mother).     Abortives tried: OTC NSAIDs, Fioricet, Triptans (Imitrex, Relpax), Nurtec.    Preventatives tried: BB (Inderal and Metoprolol), SSRIs, AEDs (PGB, TPM)     INTERVAL HISTORY     Continues to have daily headaches. TPM has not helped as preventative and Nurtec is not helping as abortive.     Review of Systems   Constitutional:  Positive for fatigue. Negative for appetite change.   HENT:  Negative for hearing loss and tinnitus.    Eyes:  Negative for photophobia and visual disturbance.   Respiratory:  Negative for apnea and shortness of breath.     Cardiovascular:  Negative for chest pain and palpitations.   Gastrointestinal:  Negative for nausea and vomiting.   Endocrine: Negative for cold intolerance and heat intolerance.   Genitourinary:  Negative for difficulty urinating and urgency.   Musculoskeletal:  Negative for arthralgias, back pain, gait problem, joint swelling, myalgias, neck pain and neck stiffness.   Skin:  Negative for color change and rash.   Allergic/Immunologic: Negative for environmental allergies and immunocompromised state.   Neurological:  Positive for headaches. Negative for dizziness, tremors, seizures, syncope, facial asymmetry, speech difficulty, weakness, light-headedness and numbness.   Hematological:  Negative for adenopathy. Does not bruise/bleed easily.   Psychiatric/Behavioral:  Negative for agitation, behavioral problems, confusion, decreased concentration, dysphoric mood, hallucinations, self-injury, sleep disturbance and suicidal ideas. The patient is not hyperactive.                Current Outpatient Medications:     ALPRAZolam (XANAX) 0.25 MG tablet, alprazolam 0.25 mg tablet  Take 1 by mouth daily, Disp: , Rfl:     BREO ELLIPTA 200-25 mcg/dose DsDv diskus inhaler, inhale 1 puff by inhalation route once daily at the same time each day, Disp: , Rfl:     buPROPion (WELLBUTRIN SR) 200 MG SR12, Take 200 mg by mouth once daily., Disp: , Rfl:     cetirizine (ZYRTEC) 10 MG tablet, Take 10 mg by mouth daily as needed. , Disp: , Rfl:     cevimeline (EVOXAC) 30 mg capsule, Take 1 capsule (30 mg total) by mouth 3 (three) times daily., Disp: 90 capsule, Rfl: 11    dicyclomine (BENTYL) 20 mg tablet, Take 20 mg by mouth as needed. , Disp: , Rfl:     EPINEPHrine (EPIPEN) 0.3 mg/0.3 mL AtIn, inject 0.3 mg by intramuscular route once as needed for anaphylaxis, Disp: , Rfl:     eszopiclone (LUNESTA) 3 mg Tab, eszopiclone 3 mg tablet  Take 1 tablet by mouth at bedtime, Disp: , Rfl:     FLOVENT  mcg/actuation inhaler, INHALE TWO PUFFS  BY MOUTH TWICE DAILY FOR ASTHMA MAINTENANCE, Disp: , Rfl:     hydrOXYzine pamoate (VISTARIL) 25 MG Cap, Take 1 capsule (25 mg total) by mouth daily as needed (headache)., Disp: 3 capsule, Rfl: 0    ipratropium (ATROVENT) 21 mcg (0.03 %) nasal spray, 2 sprays by Nasal route 3 (three) times daily as needed for Rhinitis., Disp: 30 mL, Rfl: 0    linaCLOtide (LINZESS) 145 mcg Cap capsule, Linzess 145 mcg capsule  TAKE ONE CAPSULE BY MOUTH 30 MINUTES PRIOR TO A MEAL, Disp: , Rfl:     lisinopriL 10 MG tablet, , Disp: , Rfl:     metFORMIN (GLUCOPHAGE) 1000 MG tablet, metformin 1,000 mg tablet  TAKE ONE TABLET BY MOUTH TWICE DAILY], Disp: , Rfl:     metoprolol succinate (TOPROL-XL) 25 MG 24 hr tablet, Take 25 mg by mouth., Disp: , Rfl:     montelukast (SINGULAIR) 10 mg tablet, Take 10 mg by mouth once daily. , Disp: , Rfl:     naltrexone capsule, Take 6 mg once at bedtime daily., Disp: 90 capsule, Rfl: 3    ondansetron (ZOFRAN) 8 MG tablet, Take 8 mg by mouth every 8 (eight) hours as needed., Disp: , Rfl:     pantoprazole (PROTONIX) 40 MG tablet, Take 40 mg by mouth 3 (three) times daily., Disp: , Rfl:     pregabalin (LYRICA) 75 MG capsule, TAKE 1 CAPSULE BY MOUTH TWICE DAILY, Disp: 60 capsule, Rfl: 5    PROAIR HFA 90 mcg/actuation inhaler, INHALE TWO PUFFS UP TO FOUR TIMES DAILY IF NEEDED FOR QUICK RELIEF ONLY, Disp: , Rfl: 0    propranoloL (INDERAL) 60 MG tablet, , Disp: , Rfl:     rimegepant 75 mg odt, Take 1 tablet (75 mg total) by mouth every 72 hours as needed for Migraine (do not exceed 2-3 doses within 1 week). Place ODT tablet on the tongue; alternatively the ODT tablet may be placed under the tongue, Disp: 8 tablet, Rfl: 5    rosuvastatin (CRESTOR) 10 MG tablet, Take 10 mg by mouth once daily., Disp: , Rfl: 5    SITagliptin (JANUVIA) 100 MG Tab, Januvia 100 mg tablet  TAKE ONE TABLET BY MOUTH DAILY, Disp: , Rfl:     sucralfate (CARAFATE) 1 gram tablet, Take 1 g by mouth 3 (three) times daily., Disp: , Rfl: 2     tiZANidine (ZANAFLEX) 4 MG tablet, TAKE ONE TABLET BY MOUTH EVERY 8 HOURS AS NEEDED FOR PAIN, Disp: 90 tablet, Rfl: 11    topiramate (TOPAMAX) 100 MG tablet, TAKE ONE TABLET BY MOUTH TWICE DAILY, Disp: 60 tablet, Rfl: 11    VIIBRYD 40 mg Tab tablet, Take 40 mg by mouth once daily., Disp: , Rfl:   Past Medical History:   Diagnosis Date    Anemia     Asthma     Childhood    Diabetes mellitus     Fibromyalgia     Heart disease     Hypertension      Past Surgical History:   Procedure Laterality Date    CERVICAL DISC ARTHROPLASTY  2015    2 aritificial discs placed    GASTRIC BYPASS  2015    HYSTERECTOMY  2019    Lab band reversal      LAPAROSCOPIC GASTRIC BANDING      OOPHORECTOMY      ROBOT-ASSISTED LAPAROSCOPIC HYSTERECTOMY      SINUS SURGERY      SINUS SURGERY  2007    TONSILLECTOMY, ADENOIDECTOMY  1981     Social History     Socioeconomic History    Marital status: Single   Occupational History    Occupation: Teacher   Tobacco Use    Smoking status: Former     Packs/day: 0.50     Years: 10.00     Pack years: 5.00     Types: Cigarettes     Quit date:      Years since quittin.6    Smokeless tobacco: Never   Substance and Sexual Activity    Alcohol use: No    Drug use: No    Sexual activity: Never     Partners: Male     Birth control/protection: Abstinence, Injection             Past/Current Medical/Surgical History, Past/Current Social History, Past/Current Family History and Past/Current Medications were reviewed in detail.        Objective:           VITAL SIGNS WERE REVIEWED      GENERAL APPEARANCE:     The patient looks uncomfortable.    BMI 33.75    No signs of respiratory distress.    Normal breathing pattern.    No dysmorphic features    Normal eye contact.     GENERAL MEDICAL EXAM:    HEENT:  Head is atraumatic normocephalic. Fundoscopic (Ophthalmoscopic) exam showed no disc edema.      Neck and Axillae: No JVD. No visible lesions.    Cardiopulmonary: No cyanosis. No  tachypnea. Normal respiratory effort.    Gastrointestinal/Urogenital:  No jaundice. No stomas or lesions. No visible hernias. No catheters.     Skin, Hair and Nails: No pathognonomic skin rash. No neurofibromatosis. No visible lesions.No stigmata of autoimmune disease. No clubbing.    Limbs: No varicose veins. No visible swelling.    Muskoskeletal: No visible deformities.No visible lesions.           Neurologic Exam     Mental Status   Oriented to person, place, and time.   Follows 3 step commands.   Attention: normal. Concentration: normal.   Speech: speech is normal   Level of consciousness: alert  Able to name object. Able to repeat. Normal comprehension.     Cranial Nerves   Cranial nerves II through XII intact.     CN II   Visual fields full to confrontation.   Visual acuity: normal  Right visual field deficit: none  Left visual field deficit: none     CN III, IV, VI   Pupils are equal, round, and reactive to light.  Extraocular motions are normal.   Right pupil: Size: 2 mm. Shape: regular. Reactivity: brisk. Consensual response: intact. Accommodation: intact.   Left pupil: Size: 2 mm. Shape: regular. Reactivity: brisk. Consensual response: intact. Accommodation: intact.   CN III: no CN III palsy  CN VI: no CN VI palsy  Nystagmus: none   Diplopia: none  Ophthalmoparesis: none  Upgaze: normal  Downgaze: normal  Conjugate gaze: present  Vestibulo-ocular reflex: present    CN V   Facial sensation intact.   Right facial sensation deficit: none  Left facial sensation deficit: none  Jaw jerk: normal    CN VII   Facial expression full, symmetric.   Right facial weakness: none  Left facial weakness: none    CN VIII   CN VIII normal.   Hearing: intact    CN IX, X   CN IX normal.   CN X normal.   Palate: symmetric    CN XI   CN XI normal.   Right sternocleidomastoid strength: normal  Left sternocleidomastoid strength: normal  Right trapezius strength: normal  Left trapezius strength: normal    CN XII   CN XII normal.    Tongue: not atrophic  Fasciculations: absent  Tongue deviation: none    Motor Exam   Muscle bulk: normal  Overall muscle tone: normal  Right arm tone: normal  Left arm tone: normal  Right arm pronator drift: absent  Left arm pronator drift: absent  Right leg tone: normal  Left leg tone: normal    Strength   Strength 5/5 throughout.   Right neck flexion: 5/5  Left neck flexion: 5/5  Right neck extension: 5/5  Left neck extension: 5/5  Right deltoid: 5/5  Left deltoid: 5/5  Right biceps: 5/5  Left biceps: 5/5  Right triceps: 5/5  Left triceps: 5/5  Right wrist flexion: 5/5  Left wrist flexion: 5/5  Right wrist extension: 5/5  Left wrist extension: 5/5  Right interossei: 5/5  Left interossei: 5/5  Right iliopsoas: 5/5  Left iliopsoas: 5/5  Right quadriceps: 5/5  Left quadriceps: 5/5  Right hamstrin/5  Left hamstrin/5  Right glutei: 5/5  Left glutei: 5/5  Right anterior tibial: 5/5  Left anterior tibial: 5/5  Right posterior tibial: 5/5  Left posterior tibial: 5/5  Right peroneal: 5/5  Left peroneal: 5/5  Right gastroc: 5/5  Left gastroc: 5/5    Sensory Exam   Light touch normal.   Right arm light touch: normal  Left arm light touch: normal  Right leg light touch: normal  Left leg light touch: normal  Vibration normal.   Right arm vibration: normal  Left arm vibration: normal  Right leg vibration: normal  Left leg vibration: normal  Proprioception normal.   Right arm proprioception: normal  Left arm proprioception: normal  Right leg proprioception: normal  Left leg proprioception: normal  Pinprick normal.   Right arm pinprick: normal  Left arm pinprick: normal  Right leg pinprick: normal  Left leg pinprick: normal  Graphesthesia: normal  Stereognosis: normal    Gait, Coordination, and Reflexes     Gait  Gait: normal    Coordination   Romberg: negative  Finger to nose coordination: normal  Heel to shin coordination: normal  Tandem walking coordination: normal    Tremor   Resting tremor: absent  Intention  tremor: absent  Action tremor: absent    Reflexes   Right brachioradialis: 2+  Left brachioradialis: 2+  Right biceps: 2+  Left biceps: 2+  Right triceps: 2+  Left triceps: 2+  Right patellar: 2+  Left patellar: 2+  Right achilles: 2+  Left achilles: 2+  Right plantar: normal  Left plantar: normal  Right Robison: absent  Left Robison: absent  Right ankle clonus: absent  Left ankle clonus: absent  Right pendular knee jerk: absent  Left pendular knee jerk: absent    Lab Results   Component Value Date    WBC 8.06 07/15/2022    HGB 14.6 07/15/2022    HCT 45.1 07/15/2022    MCV 97 07/15/2022     07/15/2022     Sodium   Date Value Ref Range Status   07/15/2022 140 136 - 145 mmol/L Final     Potassium   Date Value Ref Range Status   07/15/2022 4.2 3.5 - 5.1 mmol/L Final     Chloride   Date Value Ref Range Status   07/15/2022 110 95 - 110 mmol/L Final     CO2   Date Value Ref Range Status   07/15/2022 20 (L) 23 - 29 mmol/L Final     Glucose   Date Value Ref Range Status   07/15/2022 113 (H) 70 - 110 mg/dL Final     BUN   Date Value Ref Range Status   07/15/2022 10 6 - 20 mg/dL Final     Creatinine   Date Value Ref Range Status   07/15/2022 0.7 0.5 - 1.4 mg/dL Final     Calcium   Date Value Ref Range Status   07/15/2022 9.7 8.7 - 10.5 mg/dL Final     Total Protein   Date Value Ref Range Status   07/15/2022 7.5 6.0 - 8.4 g/dL Final     Albumin   Date Value Ref Range Status   07/15/2022 3.9 3.5 - 5.2 g/dL Final     Total Bilirubin   Date Value Ref Range Status   07/15/2022 0.2 0.1 - 1.0 mg/dL Final     Comment:     For infants and newborns, interpretation of results should be based  on gestational age, weight and in agreement with clinical  observations.    Premature Infant recommended reference ranges:  Up to 24 hours.............<8.0 mg/dL  Up to 48 hours............<12.0 mg/dL  3-5 days..................<15.0 mg/dL  6-29 days.................<15.0 mg/dL       Alkaline Phosphatase   Date Value Ref Range Status    07/15/2022 73 55 - 135 U/L Final     AST   Date Value Ref Range Status   07/15/2022 49 (H) 10 - 40 U/L Final     ALT   Date Value Ref Range Status   07/15/2022 54 (H) 10 - 44 U/L Final     Anion Gap   Date Value Ref Range Status   07/15/2022 10 8 - 16 mmol/L Final     eGFR if    Date Value Ref Range Status   07/15/2022 >60 >60 mL/min/1.73 m^2 Final     eGFR if non    Date Value Ref Range Status   07/15/2022 >60 >60 mL/min/1.73 m^2 Final     Comment:     Calculation used to obtain the estimated glomerular filtration  rate (eGFR) is the CKD-EPI equation.        Lab Results   Component Value Date    TUIQICFA12 524 07/15/2022     Lab Results   Component Value Date    TSH 0.657 07/19/2021    T7MTLMP 6.3 07/19/2021       09-    Brain MRI NL     Reviewed the neuroimaging independently         Assessment:       1. Migraine without aura and without status migrainosus, not intractable    2. Other iron deficiency anemias    3. S/P bariatric surgery    4. Other vitamin B12 deficiency anemias    5. Sjogren's syndrome, with unspecified organ involvement    6. Fibromyalgia    7. CFS (chronic fatigue syndrome)    8. Anxiety and depression    9. Hyperlipidemia associated with type 2 diabetes mellitus    10. History of robot-assisted laparoscopic hysterectomy    11. Hypertension associated with diabetes    12. Muscle pain    13. Primary osteoarthritis involving multiple joints    14. S/P cervical spinal fusion    15. History of endometriosis        Plan:           S/P  HYSTERECTOMY     COMMON MIGRAINE WITHOUT AURA, EPISODIC, HIGH FREQUENCY, UNCONTROLLED             BRAIN MRI WO     HEADACHE DIARY     DISCUSSED THE THREE-FOLD MANAGEMENT OF MIGRAINE:      LIFESTYLE CHANGES:       Good sleep hygiene  Avoid general triggers like lack of sleep/too much sleep, prolonged sun exposure, excessive screen time and specific triggers based on you own diary   Minimize physical and emotional stress  Smoking  avoidance and cessation  Limit caffeine drinks to 1-2 a day   Good hydration   Small frequent meals and avoid skipping meals   Moderate 30-minute-long aerobic exercises 3 times/week. Avoid strenuous exercise         ABORTIVE MEDICATIONS (ACUTE-RESCUE MEDICATIONS):     Should only be taken 2-3 times/week to avoid rebound and overuse headaches.    Abortives tried: OTC NSAIDs, Fioricet, Triptans (Imitrex, Relpax), Nurtec.    Try Ubrelvy 100 mg PO PRN.        NEXT OPTIONS:    Ditans: Reyvow (lasmiditan) 100 mg (No driving due to sedation)      LAST RESORT:     DHE NS Trudhesa (Max 2 a week)     C/I: concomitant use of vasoconstrictors like Triptans, strong CY inhibitors such as HAART PIs (eg, ritonavir, nelfinavir, or indinavir) and Macrolides (eg, erythromycin or clarithromycin), CAD, PVD, Stroke/TIA and Uncontrolled HTN.  Serious SEs include Vasospasm and Fibrosis (chronic use).       PREVENTATIVE (MORE ACCURATELY MIGRAINE REDUCTION) MEDICATIONS:         Since the patient's headache is very frequent a lengthy discussion about preventative medications was carried out.The patient understands that prevention means DECREASING frequency and severity and NOT elimination.The patient was made aware that any new medication can cause serious allergic reaction.The medication is considered failure only if a therapeutic dose reached and maintained for 6-8 weeks.    Preventatives tried: BB (Inderal and Metoprolol), SSRIs, AEDs (PGB, TPM).    Taper TPM off slowly.     Try Amitriptyline/Elavil (TCA) slow titration to 50 mg QHS which can cause sleepiness, dry eyes, dry mouth, urinary retention, and rarely cardiac arrhythmias      HELPFUL SUPPLEMENTS:     Helpful supplements include Co-Q 10, B2, Mg, Feverfew (Dolovent combination) and butterbur (Petadolex)      NEXT OPTIONS:       ANTI-CGRP AGENTS: Qulipta (alogepant) 60 mg QD, Erenumab (Aimovig) 140 mg SQ Pen monthly (Reported cases of Constipation and BP elevation) , Galcanezumab  (Emgality) 120 mg SQ Pen monthly after a loading dose of 240 mg  and Fremnezumab (Ajovy) (Ligand Blocker): 225 mg SQ monthly or 675 mg every 3 months .    Lamotrigine/Lamictal  (LTG)slow titration to 100 mg BID which can cause serious skin rash and rare cardiac arrhythmias. LTG is superior to other therapies for specifically reducing migraine aura.     Botox 200 units every 3 months .        LAST RESORT OPTIONS:      Namenda 10 mg BID     Neuromodulation: Cefaly, Relivion     Valproic acid/ Depakote       OFF LABEL-EXPERIMENTAL     Migraine surgery.    Acupuncture, massage therapy and essential oils.        .    MEDICAL/SURGICAL COMORBIDITIES     All relevant medical comorbidities noted and managed by primary care physician and medical care team.          HEALTHY LIFESTYLE AND PREVENTATIVE CARE    The patient to adhere to the age-appropriate health maintenance guidelines including screening tests and vaccinations. The patient to adhere to  healthy lifestyle, optimal weight, exercise, healthy diet, good sleep hygiene and avoiding drugs including smoking, alcohol and recreational drugs.        RTC in 8 weeks           Jose Alfredo Joshua MD, FAAN    Attending Neurologist/Epileptologist         Diplomate, American Board of Psychiatry and Neurology    Diplomate, American Board of Clinical Neurophysiology     Fellow, American Academy of Neurology             I spent a total of 47 minutes on the day of the visit.  This includes face to face time and non-face to face time preparing to see the patient (eg, review of tests), obtaining and/or reviewing separately obtained history, documenting clinical information in the electronic or other health record, independently interpreting results and communicating results to the patient/family/caregiver, or care coordinator.

## 2022-09-14 ENCOUNTER — HOSPITAL ENCOUNTER (OUTPATIENT)
Dept: RADIOLOGY | Facility: HOSPITAL | Age: 42
Discharge: HOME | End: 2022-09-14
Attending: PSYCHIATRY & NEUROLOGY
Payer: COMMERCIAL

## 2022-09-14 DIAGNOSIS — G43.009 MIGRAINE WITHOUT AURA AND WITHOUT STATUS MIGRAINOSUS, NOT INTRACTABLE: ICD-10-CM

## 2022-09-14 PROCEDURE — 70551 MRI BRAIN STEM W/O DYE: CPT | Mod: TC

## 2022-09-15 ENCOUNTER — TELEPHONE (OUTPATIENT)
Dept: NEUROLOGY | Facility: CLINIC | Age: 42
End: 2022-09-15
Payer: COMMERCIAL

## 2022-09-24 ENCOUNTER — PATIENT MESSAGE (OUTPATIENT)
Dept: RHEUMATOLOGY | Facility: CLINIC | Age: 42
End: 2022-09-24
Payer: COMMERCIAL

## 2022-11-01 ENCOUNTER — OFFICE VISIT (OUTPATIENT)
Dept: NEUROLOGY | Facility: CLINIC | Age: 42
End: 2022-11-01
Payer: COMMERCIAL

## 2022-11-01 ENCOUNTER — PATIENT MESSAGE (OUTPATIENT)
Dept: RHEUMATOLOGY | Facility: CLINIC | Age: 42
End: 2022-11-01
Payer: COMMERCIAL

## 2022-11-01 VITALS
WEIGHT: 215.38 LBS | HEIGHT: 65 IN | OXYGEN SATURATION: 98 % | DIASTOLIC BLOOD PRESSURE: 74 MMHG | HEART RATE: 93 BPM | RESPIRATION RATE: 16 BRPM | BODY MASS INDEX: 35.88 KG/M2 | SYSTOLIC BLOOD PRESSURE: 112 MMHG

## 2022-11-01 DIAGNOSIS — D51.8 OTHER VITAMIN B12 DEFICIENCY ANEMIAS: ICD-10-CM

## 2022-11-01 DIAGNOSIS — M79.7 FIBROMYALGIA: ICD-10-CM

## 2022-11-01 DIAGNOSIS — D50.8 OTHER IRON DEFICIENCY ANEMIAS: ICD-10-CM

## 2022-11-01 DIAGNOSIS — G93.32 CFS (CHRONIC FATIGUE SYNDROME): ICD-10-CM

## 2022-11-01 DIAGNOSIS — G43.009 MIGRAINE WITHOUT AURA AND WITHOUT STATUS MIGRAINOSUS, NOT INTRACTABLE: Primary | ICD-10-CM

## 2022-11-01 PROCEDURE — 3078F DIAST BP <80 MM HG: CPT | Mod: CPTII,S$GLB,, | Performed by: NURSE PRACTITIONER

## 2022-11-01 PROCEDURE — 3074F PR MOST RECENT SYSTOLIC BLOOD PRESSURE < 130 MM HG: ICD-10-PCS | Mod: CPTII,S$GLB,, | Performed by: NURSE PRACTITIONER

## 2022-11-01 PROCEDURE — 99214 PR OFFICE/OUTPT VISIT, EST, LEVL IV, 30-39 MIN: ICD-10-PCS | Mod: S$GLB,,, | Performed by: NURSE PRACTITIONER

## 2022-11-01 PROCEDURE — 1160F PR REVIEW ALL MEDS BY PRESCRIBER/CLIN PHARMACIST DOCUMENTED: ICD-10-PCS | Mod: CPTII,S$GLB,, | Performed by: NURSE PRACTITIONER

## 2022-11-01 PROCEDURE — 99999 PR PBB SHADOW E&M-EST. PATIENT-LVL V: ICD-10-PCS | Mod: PBBFAC,,, | Performed by: NURSE PRACTITIONER

## 2022-11-01 PROCEDURE — 4010F ACE/ARB THERAPY RXD/TAKEN: CPT | Mod: CPTII,S$GLB,, | Performed by: NURSE PRACTITIONER

## 2022-11-01 PROCEDURE — 99999 PR PBB SHADOW E&M-EST. PATIENT-LVL V: CPT | Mod: PBBFAC,,, | Performed by: NURSE PRACTITIONER

## 2022-11-01 PROCEDURE — 1159F PR MEDICATION LIST DOCUMENTED IN MEDICAL RECORD: ICD-10-PCS | Mod: CPTII,S$GLB,, | Performed by: NURSE PRACTITIONER

## 2022-11-01 PROCEDURE — 1160F RVW MEDS BY RX/DR IN RCRD: CPT | Mod: CPTII,S$GLB,, | Performed by: NURSE PRACTITIONER

## 2022-11-01 PROCEDURE — 1159F MED LIST DOCD IN RCRD: CPT | Mod: CPTII,S$GLB,, | Performed by: NURSE PRACTITIONER

## 2022-11-01 PROCEDURE — 3074F SYST BP LT 130 MM HG: CPT | Mod: CPTII,S$GLB,, | Performed by: NURSE PRACTITIONER

## 2022-11-01 PROCEDURE — 3078F PR MOST RECENT DIASTOLIC BLOOD PRESSURE < 80 MM HG: ICD-10-PCS | Mod: CPTII,S$GLB,, | Performed by: NURSE PRACTITIONER

## 2022-11-01 PROCEDURE — 99214 OFFICE O/P EST MOD 30 MIN: CPT | Mod: S$GLB,,, | Performed by: NURSE PRACTITIONER

## 2022-11-01 PROCEDURE — 4010F PR ACE/ARB THEARPY RXD/TAKEN: ICD-10-PCS | Mod: CPTII,S$GLB,, | Performed by: NURSE PRACTITIONER

## 2022-11-01 RX ORDER — AMITRIPTYLINE HYDROCHLORIDE 75 MG/1
75 TABLET ORAL NIGHTLY
Qty: 30 TABLET | Refills: 11 | Status: SHIPPED | OUTPATIENT
Start: 2022-11-01 | End: 2023-02-23 | Stop reason: SDUPTHER

## 2022-11-01 RX ORDER — UBROGEPANT 100 MG/1
100 TABLET ORAL
Qty: 9 TABLET | Refills: 2 | Status: SHIPPED | OUTPATIENT
Start: 2022-11-01 | End: 2023-01-11

## 2022-11-01 RX ORDER — ONDANSETRON 4 MG/1
4 TABLET, ORALLY DISINTEGRATING ORAL EVERY 6 HOURS PRN
COMMUNITY
Start: 2022-10-24

## 2022-11-01 RX ORDER — UBROGEPANT 100 MG/1
100 TABLET ORAL ONCE AS NEEDED
COMMUNITY
Start: 2022-09-06 | End: 2022-11-01 | Stop reason: SDUPTHER

## 2022-11-01 RX ORDER — METOPROLOL SUCCINATE 25 MG/1
1 TABLET, EXTENDED RELEASE ORAL NIGHTLY
COMMUNITY
Start: 2022-10-25

## 2022-11-01 NOTE — PROGRESS NOTES
Subjective:       Patient ID: Shelby Meeks is a 42 y.o. female.    Chief Complaint: Mirgraine without aura and without status migrainosus, not           HPI      BACKGROUND HISTORY       Patient is a former Patient of Dr. Goodwin. The patient is here for evaluation of headaches.       The headaches started at age 12 and recurred at the age of 19. The headaches progress from the left side with throbbing nature, and also from the neck and the back of the head. No visual aura. No associated neurological deficits.  The headaches are 6-8/10 causing significant morbidity. The headache is associated with nausea and light, sound sensitivity. The headaches are daily and could last for hours and days. Migraines seem to have gotten worse after hysterectomy and unilateral oophorectomy. No TMJ problems.  The patient denies blurry vision and ear ringing. The headache is not positional or postural but worsens with movement and valsalva. Sleep help lessen the headache. No history of head trauma. No history of seizures. No history of smoking. No caffeine overuses. No vertigo. No blacking out.  No fever, chills or rigors. No history of significant memory loss. No history of strokes.  The patient cannot think of food that aggravates the headache.  Extreme heat and extreme cold aggravate the headaches. Neck pain exacerbate the headaches (She is S/P ACDF).  Family history is remarkable for migraine (mother). Abortives tried: OTC NSAIDs, Fioricet, Triptans (Imitrex, Relpax), Nurtec.Preventatives tried: BB (Inderal and Metoprolol), SSRIs, AEDs (PGB, TPM) . Continues to have daily headaches. TPM has not helped as preventative and Nurtec is not helping as abortive.         INTERVAL HISTORY     11-1-2022 Patient present for follow up for HA management. Tolerating Amitriptyline/Elavil (TCA) 50 mg QHS no side effects, she reports that it is partially helpful. Amitriptyline 50 mg po HS has decreased frequency and intensity. Patient  reports Ubrevely 100 mg po prn is helpful, no side effects. She reports it works better than Nurtec. 09- Brain MRI NL.    Patient no longer taking TPM she was weaned off slowly. Patient reports concerns since stopping TPM she has gained 15 pounds and has increased pain related to FMS. Patient instructed to discuss treatment option with Rheumatology.             Review of Systems   Constitutional:  Positive for fatigue. Negative for appetite change.   HENT:  Negative for hearing loss and tinnitus.    Eyes:  Negative for photophobia and visual disturbance.   Respiratory:  Negative for apnea and shortness of breath.    Cardiovascular:  Negative for chest pain and palpitations.   Gastrointestinal:  Negative for nausea and vomiting.   Endocrine: Negative for cold intolerance and heat intolerance.   Genitourinary:  Negative for difficulty urinating and urgency.   Musculoskeletal:  Negative for arthralgias, back pain, gait problem, joint swelling, myalgias, neck pain and neck stiffness.   Skin:  Negative for color change and rash.   Allergic/Immunologic: Negative for environmental allergies and immunocompromised state.   Neurological:  Positive for headaches. Negative for dizziness, tremors, seizures, syncope, facial asymmetry, speech difficulty, weakness, light-headedness and numbness.   Hematological:  Negative for adenopathy. Does not bruise/bleed easily.   Psychiatric/Behavioral:  Negative for agitation, behavioral problems, confusion, decreased concentration, dysphoric mood, hallucinations, self-injury, sleep disturbance and suicidal ideas. The patient is not hyperactive.                Current Outpatient Medications:     ALPRAZolam (XANAX) 0.25 MG tablet, alprazolam 0.25 mg tablet  Take 1 by mouth daily, Disp: , Rfl:     buPROPion (WELLBUTRIN SR) 200 MG SR12, Take 200 mg by mouth once daily., Disp: , Rfl:     cetirizine (ZYRTEC) 10 MG tablet, Take 10 mg by mouth daily as needed. , Disp: , Rfl:     cevimeline  (EVOXAC) 30 mg capsule, Take 1 capsule (30 mg total) by mouth 3 (three) times daily., Disp: 90 capsule, Rfl: 11    dicyclomine (BENTYL) 20 mg tablet, Take 20 mg by mouth as needed. , Disp: , Rfl:     EPINEPHrine (EPIPEN) 0.3 mg/0.3 mL AtIn, inject 0.3 mg by intramuscular route once as needed for anaphylaxis, Disp: , Rfl:     eszopiclone (LUNESTA) 3 mg Tab, eszopiclone 3 mg tablet  Take 1 tablet by mouth at bedtime, Disp: , Rfl:     FLOVENT  mcg/actuation inhaler, INHALE TWO PUFFS BY MOUTH TWICE DAILY FOR ASTHMA MAINTENANCE, Disp: , Rfl:     linaCLOtide (LINZESS) 145 mcg Cap capsule, Linzess 145 mcg capsule  TAKE ONE CAPSULE BY MOUTH 30 MINUTES PRIOR TO A MEAL, Disp: , Rfl:     lisinopriL 10 MG tablet, , Disp: , Rfl:     metFORMIN (GLUCOPHAGE) 1000 MG tablet, metformin 1,000 mg tablet  TAKE ONE TABLET BY MOUTH TWICE DAILY], Disp: , Rfl:     metoprolol succinate (TOPROL-XL) 25 MG 24 hr tablet, Take 25 mg by mouth., Disp: , Rfl:     metoprolol succinate (TOPROL-XL) 25 MG 24 hr tablet, Take 1 tablet by mouth every evening., Disp: , Rfl:     montelukast (SINGULAIR) 10 mg tablet, Take 10 mg by mouth once daily. , Disp: , Rfl:     naltrexone capsule, Take 6 mg once at bedtime daily., Disp: 90 capsule, Rfl: 3    ondansetron (ZOFRAN) 8 MG tablet, Take 8 mg by mouth every 8 (eight) hours as needed., Disp: , Rfl:     ondansetron (ZOFRAN-ODT) 4 MG TbDL, Take 4 mg by mouth every 6 (six) hours as needed., Disp: , Rfl:     pantoprazole (PROTONIX) 40 MG tablet, Take 40 mg by mouth 3 (three) times daily., Disp: , Rfl:     pregabalin (LYRICA) 75 MG capsule, TAKE 1 CAPSULE BY MOUTH TWICE DAILY, Disp: 60 capsule, Rfl: 5    rosuvastatin (CRESTOR) 10 MG tablet, Take 10 mg by mouth once daily., Disp: , Rfl: 5    SITagliptin (JANUVIA) 100 MG Tab, Januvia 100 mg tablet  TAKE ONE TABLET BY MOUTH DAILY, Disp: , Rfl:     tiZANidine (ZANAFLEX) 4 MG tablet, TAKE ONE TABLET BY MOUTH EVERY 8 HOURS AS NEEDED FOR PAIN, Disp: 90 tablet, Rfl:  11    VIIBRYD 40 mg Tab tablet, Take 40 mg by mouth once daily., Disp: , Rfl:     amitriptyline (ELAVIL) 75 MG tablet, Take 1 tablet (75 mg total) by mouth every evening., Disp: 30 tablet, Rfl: 11    UBRELVY 100 mg tablet, Take 1 tablet (100 mg total) by mouth every 72 hours as needed for Migraine (2 to 3 times max prn weekly)., Disp: 9 tablet, Rfl: 2  Past Medical History:   Diagnosis Date    Anemia     Asthma     Childhood    Diabetes mellitus     Fibromyalgia 2013    Heart disease     Hypertension 1996    Primary osteoarthritis involving multiple joints 3/6/2019     Past Surgical History:   Procedure Laterality Date    CERVICAL DISC ARTHROPLASTY  2015    2 aritificial discs placed    GASTRIC BYPASS  2015    HYSTERECTOMY      Lab band reversal      LAPAROSCOPIC GASTRIC BANDING  2001    OOPHORECTOMY      ROBOT-ASSISTED LAPAROSCOPIC HYSTERECTOMY      SINUS SURGERY  1996    SINUS SURGERY  2007    TONSILLECTOMY, ADENOIDECTOMY  1981     Social History     Socioeconomic History    Marital status: Single   Occupational History    Occupation: Teacher   Tobacco Use    Smoking status: Former     Packs/day: 0.50     Years: 10.00     Pack years: 5.00     Types: Cigarettes     Quit date:      Years since quittin.8    Smokeless tobacco: Never   Substance and Sexual Activity    Alcohol use: Yes     Comment: occ    Drug use: No    Sexual activity: Not Currently     Partners: Male     Birth control/protection: Abstinence, Injection       Past/Current Medical/Surgical History, Past/Current Social History, Past/Current Family History and Past/Current Medications were reviewed in detail.        Objective:           VITAL SIGNS WERE REVIEWED      GENERAL APPEARANCE:     The patient looks uncomfortable.    BMI 33.75>35.84 KG     No signs of respiratory distress.    Normal breathing pattern.    No dysmorphic features    Normal eye contact.     GENERAL MEDICAL EXAM:    HEENT:  Head is atraumatic  normocephalic. Fundoscopic (Ophthalmoscopic) exam showed no disc edema.      Neck and Axillae: No JVD. No visible lesions.    Cardiopulmonary: No cyanosis. No tachypnea. Normal respiratory effort.    Gastrointestinal/Urogenital:  No jaundice. No stomas or lesions. No visible hernias. No catheters.     Skin, Hair and Nails: No pathognonomic skin rash. No neurofibromatosis. No visible lesions.No stigmata of autoimmune disease. No clubbing.    Limbs: No varicose veins. No visible swelling.    Muskoskeletal: No visible deformities.No visible lesions.           Neurologic Exam     Mental Status   Oriented to person, place, and time.   Follows 3 step commands.   Attention: normal. Concentration: normal.   Speech: speech is normal   Level of consciousness: alert  Able to name object. Able to repeat. Normal comprehension.     Cranial Nerves   Cranial nerves II through XII intact.     CN II   Visual fields full to confrontation.   Visual acuity: normal  Right visual field deficit: none  Left visual field deficit: none     CN III, IV, VI   Pupils are equal, round, and reactive to light.  Extraocular motions are normal.   Right pupil: Size: 2 mm. Shape: regular. Reactivity: brisk. Consensual response: intact. Accommodation: intact.   Left pupil: Size: 2 mm. Shape: regular. Reactivity: brisk. Consensual response: intact. Accommodation: intact.   CN III: no CN III palsy  CN VI: no CN VI palsy  Nystagmus: none   Diplopia: none  Ophthalmoparesis: none  Upgaze: normal  Downgaze: normal  Conjugate gaze: present  Vestibulo-ocular reflex: present    CN V   Facial sensation intact.   Right facial sensation deficit: none  Left facial sensation deficit: none  Jaw jerk: normal    CN VII   Facial expression full, symmetric.   Right facial weakness: none  Left facial weakness: none    CN VIII   CN VIII normal.   Hearing: intact    CN IX, X   CN IX normal.   CN X normal.   Palate: symmetric    CN XI   CN XI normal.   Right  sternocleidomastoid strength: normal  Left sternocleidomastoid strength: normal  Right trapezius strength: normal  Left trapezius strength: normal    CN XII   CN XII normal.   Tongue: not atrophic  Fasciculations: absent  Tongue deviation: none    Motor Exam   Muscle bulk: normal  Overall muscle tone: normal  Right arm tone: normal  Left arm tone: normal  Right arm pronator drift: absent  Left arm pronator drift: absent  Right leg tone: normal  Left leg tone: normal    Strength   Strength 5/5 throughout.   Right neck flexion: 5/5  Left neck flexion: 5/5  Right neck extension: 5/5  Left neck extension: 5/5  Right deltoid: 5/5  Left deltoid: 5/5  Right biceps: 5/5  Left biceps: 5/5  Right triceps: 5/5  Left triceps: 5/5  Right wrist flexion: 5/5  Left wrist flexion: 5/5  Right wrist extension: 5/5  Left wrist extension: 5/5  Right interossei: 5/5  Left interossei: 5/5  Right iliopsoas: 5/5  Left iliopsoas: 5/5  Right quadriceps: 5/5  Left quadriceps: 5/5  Right hamstrin/5  Left hamstrin/5  Right glutei: 5/5  Left glutei: 5/5  Right anterior tibial: 5/5  Left anterior tibial: 5/5  Right posterior tibial: 5/5  Left posterior tibial: 5/5  Right peroneal: 5/5  Left peroneal: 5/5  Right gastroc: 5/5  Left gastroc: 5/5    Sensory Exam   Light touch normal.   Right arm light touch: normal  Left arm light touch: normal  Right leg light touch: normal  Left leg light touch: normal  Vibration normal.   Right arm vibration: normal  Left arm vibration: normal  Right leg vibration: normal  Left leg vibration: normal  Proprioception normal.   Right arm proprioception: normal  Left arm proprioception: normal  Right leg proprioception: normal  Left leg proprioception: normal  Pinprick normal.   Right arm pinprick: normal  Left arm pinprick: normal  Right leg pinprick: normal  Left leg pinprick: normal  Graphesthesia: normal  Stereognosis: normal    Gait, Coordination, and Reflexes     Gait  Gait: normal    Coordination    Romberg: negative  Finger to nose coordination: normal  Heel to shin coordination: normal  Tandem walking coordination: normal    Tremor   Resting tremor: absent  Intention tremor: absent  Action tremor: absent    Reflexes   Right brachioradialis: 2+  Left brachioradialis: 2+  Right biceps: 2+  Left biceps: 2+  Right triceps: 2+  Left triceps: 2+  Right patellar: 2+  Left patellar: 2+  Right achilles: 2+  Left achilles: 2+  Right plantar: normal  Left plantar: normal  Right Robison: absent  Left Robison: absent  Right ankle clonus: absent  Left ankle clonus: absent  Right pendular knee jerk: absent  Left pendular knee jerk: absent    Lab Results   Component Value Date    WBC 8.06 07/15/2022    HGB 14.6 07/15/2022    HCT 45.1 07/15/2022    MCV 97 07/15/2022     07/15/2022     Sodium   Date Value Ref Range Status   07/15/2022 140 136 - 145 mmol/L Final     Potassium   Date Value Ref Range Status   07/15/2022 4.2 3.5 - 5.1 mmol/L Final     Chloride   Date Value Ref Range Status   07/15/2022 110 95 - 110 mmol/L Final     CO2   Date Value Ref Range Status   07/15/2022 20 (L) 23 - 29 mmol/L Final     Glucose   Date Value Ref Range Status   07/15/2022 113 (H) 70 - 110 mg/dL Final     BUN   Date Value Ref Range Status   07/15/2022 10 6 - 20 mg/dL Final     Creatinine   Date Value Ref Range Status   07/15/2022 0.7 0.5 - 1.4 mg/dL Final     Calcium   Date Value Ref Range Status   07/15/2022 9.7 8.7 - 10.5 mg/dL Final     Total Protein   Date Value Ref Range Status   07/15/2022 7.5 6.0 - 8.4 g/dL Final     Albumin   Date Value Ref Range Status   07/15/2022 3.9 3.5 - 5.2 g/dL Final     Total Bilirubin   Date Value Ref Range Status   07/15/2022 0.2 0.1 - 1.0 mg/dL Final     Comment:     For infants and newborns, interpretation of results should be based  on gestational age, weight and in agreement with clinical  observations.    Premature Infant recommended reference ranges:  Up to 24 hours.............<8.0 mg/dL  Up to  48 hours............<12.0 mg/dL  3-5 days..................<15.0 mg/dL  6-29 days.................<15.0 mg/dL       Alkaline Phosphatase   Date Value Ref Range Status   07/15/2022 73 55 - 135 U/L Final     AST   Date Value Ref Range Status   07/15/2022 49 (H) 10 - 40 U/L Final     ALT   Date Value Ref Range Status   07/15/2022 54 (H) 10 - 44 U/L Final     Anion Gap   Date Value Ref Range Status   07/15/2022 10 8 - 16 mmol/L Final     eGFR if    Date Value Ref Range Status   07/15/2022 >60 >60 mL/min/1.73 m^2 Final     eGFR if non    Date Value Ref Range Status   07/15/2022 >60 >60 mL/min/1.73 m^2 Final     Comment:     Calculation used to obtain the estimated glomerular filtration  rate (eGFR) is the CKD-EPI equation.        Lab Results   Component Value Date    [id number] 524 07/15/2022     Lab Results   Component Value Date    TSH 0.657 07/19/2021    [id number] 6.3 07/19/2021       09-    Brain MRI NL     Reviewed the neuroimaging independently         Assessment:       1. Migraine without aura and without status migrainosus, not intractable    2. Other vitamin B12 deficiency anemias    3. Fibromyalgia    4. CFS (chronic fatigue syndrome)    5. Other iron deficiency anemias          Plan:           S/P  HYSTERECTOMY     COMMON MIGRAINE WITHOUT AURA, EPISODIC, HIGH FREQUENCY, UNCONTROLLED               HEADACHE DIARY     DISCUSSED THE THREE-FOLD MANAGEMENT OF MIGRAINE:      LIFESTYLE CHANGES:       Good sleep hygiene  Avoid general triggers like lack of sleep/too much sleep, prolonged sun exposure, excessive screen time and specific triggers based on you own diary   Minimize physical and emotional stress  Smoking avoidance and cessation  Limit caffeine drinks to 1-2 a day   Good hydration   Small frequent meals and avoid skipping meals   Moderate 30-minute-long aerobic exercises 3 times/week. Avoid strenuous exercise         ABORTIVE MEDICATIONS (ACUTE-RESCUE MEDICATIONS):      Should only be taken 2-3 times/week to avoid rebound and overuse headaches.    Abortives tried: OTC NSAIDs, Fioricet, Triptans (Imitrex, Relpax), Nurtec.    Continue Ubrelvy 100 mg PO PRN.        NEXT OPTIONS:    Ditans: Reyvow (lasmiditan) 100 mg (No driving due to sedation)      LAST RESORT:     DHE NS Trudhesa (Max 2 a week)     C/I: concomitant use of vasoconstrictors like Triptans, strong CY inhibitors such as HAART PIs (eg, ritonavir, nelfinavir, or indinavir) and Macrolides (eg, erythromycin or clarithromycin), CAD, PVD, Stroke/TIA and Uncontrolled HTN.  Serious SEs include Vasospasm and Fibrosis (chronic use).       PREVENTATIVE (MORE ACCURATELY MIGRAINE REDUCTION) MEDICATIONS:         Since the patient's headache is very frequent a lengthy discussion about preventative medications was carried out.The patient understands that prevention means DECREASING frequency and severity and NOT elimination.The patient was made aware that any new medication can cause serious allergic reaction.The medication is considered failure only if a therapeutic dose reached and maintained for 6-8 weeks.    Preventatives tried: BB (Inderal and Metoprolol), SSRIs, AEDs (PGB, TPM).    Increase Amitriptyline/Elavil (TCA) 75 mg QHS which can cause sleepiness, dry eyes, dry mouth, urinary retention, and rarely cardiac arrhythmias      HELPFUL SUPPLEMENTS:     Helpful supplements include Co-Q 10, B2, Mg, Feverfew (Dolovent combination) and butterbur (Petadolex)      NEXT OPTIONS:       ANTI-CGRP AGENTS: Qulipta (alogepant) 60 mg QD, Erenumab (Aimovig) 140 mg SQ Pen monthly (Reported cases of Constipation and BP elevation) , Galcanezumab (Emgality) 120 mg SQ Pen monthly after a loading dose of 240 mg  and Fremnezumab (Ajovy) (Ligand Blocker): 225 mg SQ monthly or 675 mg every 3 months .    Lamotrigine/Lamictal  (LTG)slow titration to 100 mg BID which can cause serious skin rash and rare cardiac arrhythmias. LTG is superior to  other therapies for specifically reducing migraine aura.     Botox 200 units every 3 months .        LAST RESORT OPTIONS:      Namenda 10 mg BID     Neuromodulation: Cefaly, Relivion     Valproic acid/ Depakote       OFF LABEL-EXPERIMENTAL     Migraine surgery.    Acupuncture, massage therapy and essential oils.      FMS CHRONIC PAIN     Discuss with Rehumatiology POC, Patient would like to resume TPM for that resason and weight management.     .    MEDICAL/SURGICAL COMORBIDITIES     All relevant medical comorbidities noted and managed by primary care physician and medical care team.          HEALTHY LIFESTYLE AND PREVENTATIVE CARE    The patient to adhere to the age-appropriate health maintenance guidelines including screening tests and vaccinations. The patient to adhere to  healthy lifestyle, optimal weight, exercise, healthy diet, good sleep hygiene and avoiding drugs including smoking, alcohol and recreational drugs.        RTC in 3 months          Kyle Mccullough, MSN NP      Collaborating Provider: Jose Alfredo Joshua MD, FAAN Neurologist/Epileptologist

## 2022-11-02 ENCOUNTER — PATIENT MESSAGE (OUTPATIENT)
Dept: RHEUMATOLOGY | Facility: CLINIC | Age: 42
End: 2022-11-02
Payer: COMMERCIAL

## 2022-11-02 RX ORDER — TOPIRAMATE 100 MG/1
100 TABLET, FILM COATED ORAL 2 TIMES DAILY
Qty: 60 TABLET | Refills: 11 | Status: SHIPPED | OUTPATIENT
Start: 2022-11-02 | End: 2023-02-23 | Stop reason: SDUPTHER

## 2022-11-02 RX ORDER — TOPIRAMATE 100 MG/1
100 TABLET, FILM COATED ORAL 2 TIMES DAILY
Qty: 60 TABLET | Refills: 11 | Status: CANCELLED | OUTPATIENT
Start: 2022-11-02 | End: 2023-11-02

## 2023-02-23 ENCOUNTER — OFFICE VISIT (OUTPATIENT)
Dept: NEUROLOGY | Facility: CLINIC | Age: 43
End: 2023-02-23
Payer: COMMERCIAL

## 2023-02-23 VITALS
WEIGHT: 209.44 LBS | HEIGHT: 65 IN | OXYGEN SATURATION: 98 % | RESPIRATION RATE: 16 BRPM | SYSTOLIC BLOOD PRESSURE: 94 MMHG | HEART RATE: 109 BPM | BODY MASS INDEX: 34.89 KG/M2 | DIASTOLIC BLOOD PRESSURE: 67 MMHG

## 2023-02-23 DIAGNOSIS — Z98.84 S/P BARIATRIC SURGERY: ICD-10-CM

## 2023-02-23 DIAGNOSIS — M79.10 MUSCLE PAIN: ICD-10-CM

## 2023-02-23 DIAGNOSIS — G43.009 MIGRAINE WITHOUT AURA AND WITHOUT STATUS MIGRAINOSUS, NOT INTRACTABLE: Primary | ICD-10-CM

## 2023-02-23 DIAGNOSIS — D51.8 OTHER VITAMIN B12 DEFICIENCY ANEMIAS: ICD-10-CM

## 2023-02-23 DIAGNOSIS — M79.7 FIBROMYALGIA: ICD-10-CM

## 2023-02-23 PROCEDURE — 1159F PR MEDICATION LIST DOCUMENTED IN MEDICAL RECORD: ICD-10-PCS | Mod: CPTII,S$GLB,, | Performed by: NURSE PRACTITIONER

## 2023-02-23 PROCEDURE — 99999 PR PBB SHADOW E&M-EST. PATIENT-LVL V: CPT | Mod: PBBFAC,,, | Performed by: NURSE PRACTITIONER

## 2023-02-23 PROCEDURE — 1159F MED LIST DOCD IN RCRD: CPT | Mod: CPTII,S$GLB,, | Performed by: NURSE PRACTITIONER

## 2023-02-23 PROCEDURE — 99214 OFFICE O/P EST MOD 30 MIN: CPT | Mod: S$GLB,,, | Performed by: NURSE PRACTITIONER

## 2023-02-23 PROCEDURE — 4010F ACE/ARB THERAPY RXD/TAKEN: CPT | Mod: CPTII,S$GLB,, | Performed by: NURSE PRACTITIONER

## 2023-02-23 PROCEDURE — 1160F RVW MEDS BY RX/DR IN RCRD: CPT | Mod: CPTII,S$GLB,, | Performed by: NURSE PRACTITIONER

## 2023-02-23 PROCEDURE — 3008F BODY MASS INDEX DOCD: CPT | Mod: CPTII,S$GLB,, | Performed by: NURSE PRACTITIONER

## 2023-02-23 PROCEDURE — 3078F PR MOST RECENT DIASTOLIC BLOOD PRESSURE < 80 MM HG: ICD-10-PCS | Mod: CPTII,S$GLB,, | Performed by: NURSE PRACTITIONER

## 2023-02-23 PROCEDURE — 99214 PR OFFICE/OUTPT VISIT, EST, LEVL IV, 30-39 MIN: ICD-10-PCS | Mod: S$GLB,,, | Performed by: NURSE PRACTITIONER

## 2023-02-23 PROCEDURE — 99999 PR PBB SHADOW E&M-EST. PATIENT-LVL V: ICD-10-PCS | Mod: PBBFAC,,, | Performed by: NURSE PRACTITIONER

## 2023-02-23 PROCEDURE — 4010F PR ACE/ARB THEARPY RXD/TAKEN: ICD-10-PCS | Mod: CPTII,S$GLB,, | Performed by: NURSE PRACTITIONER

## 2023-02-23 PROCEDURE — 3078F DIAST BP <80 MM HG: CPT | Mod: CPTII,S$GLB,, | Performed by: NURSE PRACTITIONER

## 2023-02-23 PROCEDURE — 3074F SYST BP LT 130 MM HG: CPT | Mod: CPTII,S$GLB,, | Performed by: NURSE PRACTITIONER

## 2023-02-23 PROCEDURE — 3074F PR MOST RECENT SYSTOLIC BLOOD PRESSURE < 130 MM HG: ICD-10-PCS | Mod: CPTII,S$GLB,, | Performed by: NURSE PRACTITIONER

## 2023-02-23 PROCEDURE — 1160F PR REVIEW ALL MEDS BY PRESCRIBER/CLIN PHARMACIST DOCUMENTED: ICD-10-PCS | Mod: CPTII,S$GLB,, | Performed by: NURSE PRACTITIONER

## 2023-02-23 PROCEDURE — 3008F PR BODY MASS INDEX (BMI) DOCUMENTED: ICD-10-PCS | Mod: CPTII,S$GLB,, | Performed by: NURSE PRACTITIONER

## 2023-02-23 RX ORDER — TOPIRAMATE 100 MG/1
100 TABLET, FILM COATED ORAL 2 TIMES DAILY
Qty: 60 TABLET | Refills: 11 | Status: SHIPPED | OUTPATIENT
Start: 2023-02-23 | End: 2024-03-06

## 2023-02-23 RX ORDER — UBROGEPANT 100 MG/1
100 TABLET ORAL
Qty: 10 TABLET | Refills: 2 | Status: SHIPPED | OUTPATIENT
Start: 2023-02-23 | End: 2023-04-14

## 2023-02-23 RX ORDER — AMITRIPTYLINE HYDROCHLORIDE 75 MG/1
75 TABLET ORAL NIGHTLY
Qty: 30 TABLET | Refills: 11 | Status: SHIPPED | OUTPATIENT
Start: 2023-02-23 | End: 2024-03-06

## 2023-02-23 NOTE — PROGRESS NOTES
Subjective:       Patient ID: Shelby Meeks is a 43 y.o. female.    Chief Complaint: Migraine without aura and without status migrainosus, not i  Medication Refills Routine Follow up.       `        BACKGROUND HISTORY       Patient is a former Patient of Dr. Goodwin. The patient is here for evaluation of headaches.       The headaches started at age 12 and recurred at the age of 19. The headaches progress from the left side with throbbing nature, and also from the neck and the back of the head. No visual aura. No associated neurological deficits.  The headaches are 6-8/10 causing significant morbidity. The headache is associated with nausea and light, sound sensitivity. The headaches are daily and could last for hours and days. Migraines seem to have gotten worse after hysterectomy and unilateral oophorectomy. No TMJ problems.  The patient denies blurry vision and ear ringing. The headache is not positional or postural but worsens with movement and valsalva. Sleep help lessen the headache. No history of head trauma. No history of seizures. No history of smoking. No caffeine overuses. No vertigo. No blacking out.  No fever, chills or rigors. No history of significant memory loss. No history of strokes.  The patient cannot think of food that aggravates the headache.  Extreme heat and extreme cold aggravate the headaches. Neck pain exacerbate the headaches (She is S/P ACDF).  Family history is remarkable for migraine (mother). Abortives tried: OTC NSAIDs, Fioricet, Triptans (Imitrex, Relpax), Nurtec.Preventatives tried: BB (Inderal and Metoprolol), SSRIs, AEDs (PGB, TPM) . Continues to have daily headaches. TPM has not helped as preventative and Nurtec is not helping as abortive. 11-1-2022 Patient present for follow up for HA management. Tolerating Amitriptyline/Elavil (TCA) 50 mg QHS no side effects, she reports that it is partially helpful. Amitriptyline 50 mg po HS has decreased frequency and intensity.  Patient reports Ubrevely 100 mg po prn is helpful, no side effects. She reports it works better than Nurtec. 09- Brain MRI NL.    Patient no longer taking TPM she was weaned off slowly. Patient reports concerns since stopping TPM she has gained 15 pounds and has increased pain related to FMS. Patient instructed to discuss treatment option with Rheumatology.         INTERVAL HISTORY     02-: Patient present for follow up for HA management. Tolerating Amitriptyline/Elavil (TCA) 75 mg QHS no side effects, she reports decreased headache frequency and intensity. Patient reports Ubrevely 100 mg po prn is most helpful, no side effects. Patient restarted  mg po BID helps with FMS. Refills sent.           Review of Systems   Constitutional:  Positive for fatigue. Negative for appetite change.   HENT:  Negative for hearing loss and tinnitus.    Eyes:  Negative for photophobia and visual disturbance.   Respiratory:  Negative for apnea and shortness of breath.    Cardiovascular:  Negative for chest pain and palpitations.   Gastrointestinal:  Negative for nausea and vomiting.   Endocrine: Negative for cold intolerance and heat intolerance.   Genitourinary:  Negative for difficulty urinating and urgency.   Musculoskeletal:  Negative for arthralgias, back pain, gait problem, joint swelling, myalgias, neck pain and neck stiffness.   Skin:  Negative for color change and rash.   Allergic/Immunologic: Negative for environmental allergies and immunocompromised state.   Neurological:  Positive for headaches. Negative for dizziness, tremors, seizures, syncope, facial asymmetry, speech difficulty, weakness, light-headedness and numbness.   Hematological:  Negative for adenopathy. Does not bruise/bleed easily.   Psychiatric/Behavioral:  Negative for agitation, behavioral problems, confusion, decreased concentration, dysphoric mood, hallucinations, self-injury, sleep disturbance and suicidal ideas. The patient is not  hyperactive.                Current Outpatient Medications:     ALPRAZolam (XANAX) 0.25 MG tablet, alprazolam 0.25 mg tablet  Take 1 by mouth daily, Disp: , Rfl:     buPROPion (WELLBUTRIN SR) 200 MG SR12, Take 200 mg by mouth once daily., Disp: , Rfl:     cetirizine (ZYRTEC) 10 MG tablet, Take 10 mg by mouth daily as needed. , Disp: , Rfl:     cevimeline (EVOXAC) 30 mg capsule, Take 1 capsule (30 mg total) by mouth 3 (three) times daily., Disp: 90 capsule, Rfl: 11    dicyclomine (BENTYL) 20 mg tablet, Take 20 mg by mouth as needed. , Disp: , Rfl:     EPINEPHrine (EPIPEN) 0.3 mg/0.3 mL AtIn, inject 0.3 mg by intramuscular route once as needed for anaphylaxis, Disp: , Rfl:     eszopiclone (LUNESTA) 3 mg Tab, eszopiclone 3 mg tablet  Take 1 tablet by mouth at bedtime, Disp: , Rfl:     FLOVENT  mcg/actuation inhaler, INHALE TWO PUFFS BY MOUTH TWICE DAILY FOR ASTHMA MAINTENANCE, Disp: , Rfl:     linaCLOtide (LINZESS) 145 mcg Cap capsule, Linzess 145 mcg capsule  TAKE ONE CAPSULE BY MOUTH 30 MINUTES PRIOR TO A MEAL, Disp: , Rfl:     lisinopriL 10 MG tablet, , Disp: , Rfl:     metFORMIN (GLUCOPHAGE) 1000 MG tablet, metformin 1,000 mg tablet  TAKE ONE TABLET BY MOUTH TWICE DAILY], Disp: , Rfl:     metoprolol succinate (TOPROL-XL) 25 MG 24 hr tablet, Take 1 tablet by mouth every evening., Disp: , Rfl:     montelukast (SINGULAIR) 10 mg tablet, Take 10 mg by mouth once daily. , Disp: , Rfl:     naltrexone capsule, Take 6 mg once at bedtime daily., Disp: 90 capsule, Rfl: 3    ondansetron (ZOFRAN) 8 MG tablet, Take 8 mg by mouth every 8 (eight) hours as needed., Disp: , Rfl:     ondansetron (ZOFRAN-ODT) 4 MG TbDL, Take 4 mg by mouth every 6 (six) hours as needed., Disp: , Rfl:     pantoprazole (PROTONIX) 40 MG tablet, Take 40 mg by mouth 3 (three) times daily., Disp: , Rfl:     pregabalin (LYRICA) 75 MG capsule, TAKE ONE CAPSULE BY MOUTH TWICE DAILY, Disp: 60 capsule, Rfl: 5    rosuvastatin (CRESTOR) 10 MG tablet, Take 10  mg by mouth once daily., Disp: , Rfl: 5    SITagliptin (JANUVIA) 100 MG Tab, Januvia 100 mg tablet  TAKE ONE TABLET BY MOUTH DAILY, Disp: , Rfl:     tiZANidine (ZANAFLEX) 4 MG tablet, TAKE ONE TABLET BY MOUTH EVERY 8 HOURS AS NEEDED FOR PAIN, Disp: 90 tablet, Rfl: 11    VIIBRYD 40 mg Tab tablet, Take 40 mg by mouth once daily., Disp: , Rfl:     amitriptyline (ELAVIL) 75 MG tablet, Take 1 tablet (75 mg total) by mouth every evening., Disp: 30 tablet, Rfl: 11    metoprolol succinate (TOPROL-XL) 25 MG 24 hr tablet, Take 25 mg by mouth., Disp: , Rfl:     topiramate (TOPAMAX) 100 MG tablet, Take 1 tablet (100 mg total) by mouth 2 (two) times daily., Disp: 60 tablet, Rfl: 11    UBRELVY 100 mg tablet, Take 1 tablet (100 mg total) by mouth every 72 hours as needed for Migraine (2 to 3 times max per week as needed)., Disp: 10 tablet, Rfl: 2  Past Medical History:   Diagnosis Date    Anemia     Asthma     Childhood    Diabetes mellitus 2003    Fibromyalgia 2013    Heart disease     Hypertension 1996    Primary osteoarthritis involving multiple joints 3/6/2019     Past Surgical History:   Procedure Laterality Date    CERVICAL DISC ARTHROPLASTY  2015    2 aritificial discs placed    GASTRIC BYPASS  2015    HYSTERECTOMY  2019    Lab band reversal  2014    LAPAROSCOPIC GASTRIC BANDING  2001    OOPHORECTOMY  2019    ROBOT-ASSISTED LAPAROSCOPIC HYSTERECTOMY      SINUS SURGERY  1996    SINUS SURGERY  2007    TONSILLECTOMY, ADENOIDECTOMY  1981     Social History     Socioeconomic History    Marital status: Single   Occupational History    Occupation: Teacher   Tobacco Use    Smoking status: Former     Packs/day: 0.50     Years: 10.00     Pack years: 5.00     Types: Cigarettes     Quit date:      Years since quittin.1    Smokeless tobacco: Never   Substance and Sexual Activity    Alcohol use: Yes     Comment: occ    Drug use: No    Sexual activity: Not Currently     Partners: Male     Birth control/protection:  Abstinence, Injection       Past/Current Medical/Surgical History, Past/Current Social History, Past/Current Family History and Past/Current Medications were reviewed in detail.        Objective:           VITAL SIGNS WERE REVIEWED      GENERAL APPEARANCE:     The patient looks uncomfortable.    BMI 33.75>35.84 KG     No signs of respiratory distress.    Normal breathing pattern.    No dysmorphic features    Normal eye contact.     GENERAL MEDICAL EXAM:    HEENT:  Head is atraumatic normocephalic. Fundoscopic (Ophthalmoscopic) exam showed no disc edema.      Neck and Axillae: No JVD. No visible lesions.    Cardiopulmonary: No cyanosis. No tachypnea. Normal respiratory effort.    Gastrointestinal/Urogenital:  No jaundice. No stomas or lesions. No visible hernias. No catheters.     Skin, Hair and Nails: No pathognonomic skin rash. No neurofibromatosis. No visible lesions.No stigmata of autoimmune disease. No clubbing.    Limbs: No varicose veins. No visible swelling.    Muskoskeletal: No visible deformities.No visible lesions.           Neurologic Exam     Mental Status   Oriented to person, place, and time.   Follows 3 step commands.   Attention: normal. Concentration: normal.   Speech: speech is normal   Level of consciousness: alert  Able to name object. Able to repeat. Normal comprehension.     Cranial Nerves   Cranial nerves II through XII intact.     CN II   Visual fields full to confrontation.   Visual acuity: normal  Right visual field deficit: none  Left visual field deficit: none     CN III, IV, VI   Pupils are equal, round, and reactive to light.  Extraocular motions are normal.   Right pupil: Size: 2 mm. Shape: regular. Reactivity: brisk. Consensual response: intact. Accommodation: intact.   Left pupil: Size: 2 mm. Shape: regular. Reactivity: brisk. Consensual response: intact. Accommodation: intact.   CN III: no CN III palsy  CN VI: no CN VI palsy  Nystagmus: none   Diplopia: none  Ophthalmoparesis:  none  Upgaze: normal  Downgaze: normal  Conjugate gaze: present  Vestibulo-ocular reflex: present    CN V   Facial sensation intact.   Right facial sensation deficit: none  Left facial sensation deficit: none  Jaw jerk: normal    CN VII   Facial expression full, symmetric.   Right facial weakness: none  Left facial weakness: none    CN VIII   CN VIII normal.   Hearing: intact    CN IX, X   CN IX normal.   CN X normal.   Palate: symmetric    CN XI   CN XI normal.   Right sternocleidomastoid strength: normal  Left sternocleidomastoid strength: normal  Right trapezius strength: normal  Left trapezius strength: normal    CN XII   CN XII normal.   Tongue: not atrophic  Fasciculations: absent  Tongue deviation: none    Motor Exam   Muscle bulk: normal  Overall muscle tone: normal  Right arm tone: normal  Left arm tone: normal  Right arm pronator drift: absent  Left arm pronator drift: absent  Right leg tone: normal  Left leg tone: normal    Strength   Strength 5/5 throughout.   Right neck flexion: 5/5  Left neck flexion: 5/5  Right neck extension: 5/5  Left neck extension: 5/5  Right deltoid: 5/5  Left deltoid: 5/5  Right biceps: 5/5  Left biceps: 5/5  Right triceps: 5/5  Left triceps: 5/5  Right wrist flexion: 5/5  Left wrist flexion: 5/5  Right wrist extension: 5/5  Left wrist extension: 5/5  Right interossei: 5/5  Left interossei: 5/5  Right iliopsoas: 5/5  Left iliopsoas: 5/5  Right quadriceps: 5/5  Left quadriceps: 5/5  Right hamstrin/5  Left hamstrin/5  Right glutei: 5/5  Left glutei: 5/5  Right anterior tibial: 5/5  Left anterior tibial: 5/5  Right posterior tibial: 5/5  Left posterior tibial: 5/5  Right peroneal: 5/5  Left peroneal: 5/5  Right gastroc: 5/5  Left gastroc: 5/5    Sensory Exam   Light touch normal.   Right arm light touch: normal  Left arm light touch: normal  Right leg light touch: normal  Left leg light touch: normal  Vibration normal.   Right arm vibration: normal  Left arm vibration:  normal  Right leg vibration: normal  Left leg vibration: normal  Proprioception normal.   Right arm proprioception: normal  Left arm proprioception: normal  Right leg proprioception: normal  Left leg proprioception: normal  Pinprick normal.   Right arm pinprick: normal  Left arm pinprick: normal  Right leg pinprick: normal  Left leg pinprick: normal  Graphesthesia: normal  Stereognosis: normal    Gait, Coordination, and Reflexes     Gait  Gait: normal    Coordination   Romberg: negative  Finger to nose coordination: normal  Heel to shin coordination: normal  Tandem walking coordination: normal    Tremor   Resting tremor: absent  Intention tremor: absent  Action tremor: absent    Reflexes   Right brachioradialis: 2+  Left brachioradialis: 2+  Right biceps: 2+  Left biceps: 2+  Right triceps: 2+  Left triceps: 2+  Right patellar: 2+  Left patellar: 2+  Right achilles: 2+  Left achilles: 2+  Right plantar: normal  Left plantar: normal  Right Robison: absent  Left Robison: absent  Right ankle clonus: absent  Left ankle clonus: absent  Right pendular knee jerk: absent  Left pendular knee jerk: absent    Lab Results   Component Value Date    WBC 8.06 07/15/2022    HGB 14.6 07/15/2022    HCT 45.1 07/15/2022    MCV 97 07/15/2022     07/15/2022     Sodium   Date Value Ref Range Status   07/15/2022 140 136 - 145 mmol/L Final     Potassium   Date Value Ref Range Status   07/15/2022 4.2 3.5 - 5.1 mmol/L Final     Chloride   Date Value Ref Range Status   07/15/2022 110 95 - 110 mmol/L Final     CO2   Date Value Ref Range Status   07/15/2022 20 (L) 23 - 29 mmol/L Final     Glucose   Date Value Ref Range Status   07/15/2022 113 (H) 70 - 110 mg/dL Final     BUN   Date Value Ref Range Status   07/15/2022 10 6 - 20 mg/dL Final     Creatinine   Date Value Ref Range Status   07/15/2022 0.7 0.5 - 1.4 mg/dL Final     Calcium   Date Value Ref Range Status   07/15/2022 9.7 8.7 - 10.5 mg/dL Final     Total Protein   Date Value Ref  Range Status   07/15/2022 7.5 6.0 - 8.4 g/dL Final     Albumin   Date Value Ref Range Status   07/15/2022 3.9 3.5 - 5.2 g/dL Final     Total Bilirubin   Date Value Ref Range Status   07/15/2022 0.2 0.1 - 1.0 mg/dL Final     Comment:     For infants and newborns, interpretation of results should be based  on gestational age, weight and in agreement with clinical  observations.    Premature Infant recommended reference ranges:  Up to 24 hours.............<8.0 mg/dL  Up to 48 hours............<12.0 mg/dL  3-5 days..................<15.0 mg/dL  6-29 days.................<15.0 mg/dL       Alkaline Phosphatase   Date Value Ref Range Status   07/15/2022 73 55 - 135 U/L Final     AST   Date Value Ref Range Status   07/15/2022 49 (H) 10 - 40 U/L Final     ALT   Date Value Ref Range Status   07/15/2022 54 (H) 10 - 44 U/L Final     Anion Gap   Date Value Ref Range Status   07/15/2022 10 8 - 16 mmol/L Final     eGFR if    Date Value Ref Range Status   07/15/2022 >60 >60 mL/min/1.73 m^2 Final     eGFR if non    Date Value Ref Range Status   07/15/2022 >60 >60 mL/min/1.73 m^2 Final     Comment:     Calculation used to obtain the estimated glomerular filtration  rate (eGFR) is the CKD-EPI equation.        Lab Results   Component Value Date    HMHIDHNA64 524 07/15/2022     Lab Results   Component Value Date    TSH 0.657 07/19/2021    F1TQXGZ 6.3 07/19/2021       09-    Brain MRI NL     Reviewed the neuroimaging independently         Assessment:       1. Migraine without aura and without status migrainosus, not intractable    2. Fibromyalgia    3. Other vitamin B12 deficiency anemias    4. S/P bariatric surgery    5. Muscle pain            Plan:           S/P  HYSTERECTOMY     COMMON MIGRAINE WITHOUT AURA, EPISODIC, HIGH FREQUENCY, UNCONTROLLED               HEADACHE DIARY     DISCUSSED THE THREE-FOLD MANAGEMENT OF MIGRAINE:      LIFESTYLE CHANGES:       Good sleep hygiene  Avoid general  triggers like lack of sleep/too much sleep, prolonged sun exposure, excessive screen time and specific triggers based on you own diary   Minimize physical and emotional stress  Smoking avoidance and cessation  Limit caffeine drinks to 1-2 a day   Good hydration   Small frequent meals and avoid skipping meals   Moderate 30-minute-long aerobic exercises 3 times/week. Avoid strenuous exercise         ABORTIVE MEDICATIONS (ACUTE-RESCUE MEDICATIONS):     Should only be taken 2-3 times/week to avoid rebound and overuse headaches.    Abortives tried: OTC NSAIDs, Fioricet, Triptans (Imitrex, Relpax), Nurtec.    Continue Ubrelvy 100 mg PO PRN.        NEXT OPTIONS:    Ditans: Reyvow (lasmiditan) 100 mg (No driving due to sedation)      LAST RESORT:     DHE NS Trudhesa (Max 2 a week)     C/I: concomitant use of vasoconstrictors like Triptans, strong CY inhibitors such as HAART PIs (eg, ritonavir, nelfinavir, or indinavir) and Macrolides (eg, erythromycin or clarithromycin), CAD, PVD, Stroke/TIA and Uncontrolled HTN.  Serious SEs include Vasospasm and Fibrosis (chronic use).       PREVENTATIVE (MORE ACCURATELY MIGRAINE REDUCTION) MEDICATIONS:         Since the patient's headache is very frequent a lengthy discussion about preventative medications was carried out.The patient understands that prevention means DECREASING frequency and severity and NOT elimination.The patient was made aware that any new medication can cause serious allergic reaction.The medication is considered failure only if a therapeutic dose reached and maintained for 6-8 weeks.    Preventatives tried: BB (Inderal and Metoprolol), SSRIs, AEDs (PGB, TPM).    Continue Amitriptyline/Elavil (TCA) 75 mg QHS refills sent     HELPFUL SUPPLEMENTS:     Helpful supplements include Co-Q 10, B2, Mg, Feverfew (Dolovent combination) and butterbur (Petadolex)      NEXT OPTIONS:       ANTI-CGRP AGENTS: Qulipta (alogepant) 60 mg QD, Erenumab (Aimovig) 140 mg SQ Pen monthly  (Reported cases of Constipation and BP elevation) , Galcanezumab (Emgality) 120 mg SQ Pen monthly after a loading dose of 240 mg  and Fremnezumab (Ajovy) (Ligand Blocker): 225 mg SQ monthly or 675 mg every 3 months .    Lamotrigine/Lamictal  (LTG)slow titration to 100 mg BID which can cause serious skin rash and rare cardiac arrhythmias. LTG is superior to other therapies for specifically reducing migraine aura.     Botox 200 units every 3 months .        LAST RESORT OPTIONS:      Namenda 10 mg BID     Neuromodulation: Cefaly, Relivion     Valproic acid/ Depakote       OFF LABEL-EXPERIMENTAL     Migraine surgery.    Acupuncture, massage therapy and essential oils.      FMS CHRONIC PAIN     Continue  mg po BID  for that reason of Pain control and weight management.       MEDICAL/SURGICAL COMORBIDITIES     All relevant medical comorbidities noted and managed by primary care physician and medical care team.          HEALTHY LIFESTYLE AND PREVENTATIVE CARE    The patient to adhere to the age-appropriate health maintenance guidelines including screening tests and vaccinations. The patient to adhere to  healthy lifestyle, optimal weight, exercise, healthy diet, good sleep hygiene and avoiding drugs including smoking, alcohol and recreational drugs.        RTC in 11  months     Total E/M 30 minutes      Kyle Mccullough, MSN NP      Collaborating Provider: Jose Alfredo Joshua MD, FAAN Neurologist/Epileptologist

## 2023-03-08 DIAGNOSIS — G93.32 CFS (CHRONIC FATIGUE SYNDROME): ICD-10-CM

## 2023-03-08 DIAGNOSIS — M79.7 FIBROMYALGIA: ICD-10-CM

## 2023-03-08 DIAGNOSIS — M35.09 SJOGREN'S SYNDROME WITH OTHER ORGAN INVOLVEMENT: ICD-10-CM

## 2023-05-08 ENCOUNTER — LAB VISIT (OUTPATIENT)
Dept: LAB | Facility: HOSPITAL | Age: 43
End: 2023-05-08
Attending: INTERNAL MEDICINE
Payer: COMMERCIAL

## 2023-05-08 ENCOUNTER — OFFICE VISIT (OUTPATIENT)
Dept: HEMATOLOGY/ONCOLOGY | Facility: CLINIC | Age: 43
End: 2023-05-08
Payer: COMMERCIAL

## 2023-05-08 VITALS
WEIGHT: 194.69 LBS | TEMPERATURE: 98 F | BODY MASS INDEX: 32.44 KG/M2 | DIASTOLIC BLOOD PRESSURE: 72 MMHG | SYSTOLIC BLOOD PRESSURE: 102 MMHG | HEIGHT: 65 IN | OXYGEN SATURATION: 100 % | HEART RATE: 87 BPM | RESPIRATION RATE: 18 BRPM

## 2023-05-08 DIAGNOSIS — D51.3 OTHER DIETARY VITAMIN B12 DEFICIENCY ANEMIA: ICD-10-CM

## 2023-05-08 DIAGNOSIS — D50.8 IRON DEFICIENCY ANEMIA SECONDARY TO INADEQUATE DIETARY IRON INTAKE: ICD-10-CM

## 2023-05-08 DIAGNOSIS — Z98.84 H/O BARIATRIC SURGERY: ICD-10-CM

## 2023-05-08 DIAGNOSIS — Z98.84 H/O BARIATRIC SURGERY: Primary | ICD-10-CM

## 2023-05-08 LAB
BASOPHILS # BLD AUTO: 0.03 K/UL (ref 0–0.2)
BASOPHILS NFR BLD: 0.4 % (ref 0–1.9)
DIFFERENTIAL METHOD: ABNORMAL
EOSINOPHIL # BLD AUTO: 0.1 K/UL (ref 0–0.5)
EOSINOPHIL NFR BLD: 2.1 % (ref 0–8)
ERYTHROCYTE [DISTWIDTH] IN BLOOD BY AUTOMATED COUNT: 13.3 % (ref 11.5–14.5)
FERRITIN SERPL-MCNC: 86 NG/ML (ref 20–300)
HCT VFR BLD AUTO: 39.2 % (ref 37–48.5)
HGB BLD-MCNC: 12.5 G/DL (ref 12–16)
IMM GRANULOCYTES # BLD AUTO: 0.02 K/UL (ref 0–0.04)
IMM GRANULOCYTES NFR BLD AUTO: 0.3 % (ref 0–0.5)
IRON SERPL-MCNC: 57 UG/DL (ref 30–160)
LYMPHOCYTES # BLD AUTO: 2.7 K/UL (ref 1–4.8)
LYMPHOCYTES NFR BLD: 39.3 % (ref 18–48)
MCH RBC QN AUTO: 31 PG (ref 27–31)
MCHC RBC AUTO-ENTMCNC: 31.9 G/DL (ref 32–36)
MCV RBC AUTO: 97 FL (ref 82–98)
MONOCYTES # BLD AUTO: 0.6 K/UL (ref 0.3–1)
MONOCYTES NFR BLD: 8.6 % (ref 4–15)
NEUTROPHILS # BLD AUTO: 3.3 K/UL (ref 1.8–7.7)
NEUTROPHILS NFR BLD: 49.3 % (ref 38–73)
NRBC BLD-RTO: 0 /100 WBC
PLATELET # BLD AUTO: 386 K/UL (ref 150–450)
PMV BLD AUTO: 9.5 FL (ref 9.2–12.9)
RBC # BLD AUTO: 4.03 M/UL (ref 4–5.4)
SATURATED IRON: 16 % (ref 20–50)
TOTAL IRON BINDING CAPACITY: 349 UG/DL (ref 250–450)
TRANSFERRIN SERPL-MCNC: 236 MG/DL (ref 200–375)
VIT B12 SERPL-MCNC: 280 PG/ML (ref 210–950)
WBC # BLD AUTO: 6.77 K/UL (ref 3.9–12.7)

## 2023-05-08 PROCEDURE — 1159F MED LIST DOCD IN RCRD: CPT | Mod: CPTII,S$GLB,, | Performed by: INTERNAL MEDICINE

## 2023-05-08 PROCEDURE — 3078F DIAST BP <80 MM HG: CPT | Mod: CPTII,S$GLB,, | Performed by: INTERNAL MEDICINE

## 2023-05-08 PROCEDURE — 4010F ACE/ARB THERAPY RXD/TAKEN: CPT | Mod: CPTII,S$GLB,, | Performed by: INTERNAL MEDICINE

## 2023-05-08 PROCEDURE — 36415 COLL VENOUS BLD VENIPUNCTURE: CPT | Performed by: INTERNAL MEDICINE

## 2023-05-08 PROCEDURE — 3008F BODY MASS INDEX DOCD: CPT | Mod: CPTII,S$GLB,, | Performed by: INTERNAL MEDICINE

## 2023-05-08 PROCEDURE — 1159F PR MEDICATION LIST DOCUMENTED IN MEDICAL RECORD: ICD-10-PCS | Mod: CPTII,S$GLB,, | Performed by: INTERNAL MEDICINE

## 2023-05-08 PROCEDURE — 3078F PR MOST RECENT DIASTOLIC BLOOD PRESSURE < 80 MM HG: ICD-10-PCS | Mod: CPTII,S$GLB,, | Performed by: INTERNAL MEDICINE

## 2023-05-08 PROCEDURE — 99999 PR PBB SHADOW E&M-EST. PATIENT-LVL V: ICD-10-PCS | Mod: PBBFAC,,, | Performed by: INTERNAL MEDICINE

## 2023-05-08 PROCEDURE — 1160F RVW MEDS BY RX/DR IN RCRD: CPT | Mod: CPTII,S$GLB,, | Performed by: INTERNAL MEDICINE

## 2023-05-08 PROCEDURE — 1160F PR REVIEW ALL MEDS BY PRESCRIBER/CLIN PHARMACIST DOCUMENTED: ICD-10-PCS | Mod: CPTII,S$GLB,, | Performed by: INTERNAL MEDICINE

## 2023-05-08 PROCEDURE — 99204 PR OFFICE/OUTPT VISIT, NEW, LEVL IV, 45-59 MIN: ICD-10-PCS | Mod: S$GLB,,, | Performed by: INTERNAL MEDICINE

## 2023-05-08 PROCEDURE — 82728 ASSAY OF FERRITIN: CPT | Performed by: INTERNAL MEDICINE

## 2023-05-08 PROCEDURE — 4010F PR ACE/ARB THEARPY RXD/TAKEN: ICD-10-PCS | Mod: CPTII,S$GLB,, | Performed by: INTERNAL MEDICINE

## 2023-05-08 PROCEDURE — 84466 ASSAY OF TRANSFERRIN: CPT | Performed by: INTERNAL MEDICINE

## 2023-05-08 PROCEDURE — 3074F SYST BP LT 130 MM HG: CPT | Mod: CPTII,S$GLB,, | Performed by: INTERNAL MEDICINE

## 2023-05-08 PROCEDURE — 3008F PR BODY MASS INDEX (BMI) DOCUMENTED: ICD-10-PCS | Mod: CPTII,S$GLB,, | Performed by: INTERNAL MEDICINE

## 2023-05-08 PROCEDURE — 99204 OFFICE O/P NEW MOD 45 MIN: CPT | Mod: S$GLB,,, | Performed by: INTERNAL MEDICINE

## 2023-05-08 PROCEDURE — 85025 COMPLETE CBC W/AUTO DIFF WBC: CPT | Performed by: INTERNAL MEDICINE

## 2023-05-08 PROCEDURE — 99999 PR PBB SHADOW E&M-EST. PATIENT-LVL V: CPT | Mod: PBBFAC,,, | Performed by: INTERNAL MEDICINE

## 2023-05-08 PROCEDURE — 3074F PR MOST RECENT SYSTOLIC BLOOD PRESSURE < 130 MM HG: ICD-10-PCS | Mod: CPTII,S$GLB,, | Performed by: INTERNAL MEDICINE

## 2023-05-08 PROCEDURE — 82607 VITAMIN B-12: CPT | Performed by: INTERNAL MEDICINE

## 2023-05-08 RX ORDER — SEMAGLUTIDE 0.68 MG/ML
0.5 INJECTION, SOLUTION SUBCUTANEOUS
COMMUNITY

## 2023-05-08 NOTE — PROGRESS NOTES
Subjective:      DATE OF VISIT: 5/8/23     ?  Patient ID:?Shelby Meeks is a 43 y.o. female.?? MR#: [id number]   ?   REFERRING PROVIDER: Aaareferral Self  No address on file     ? Primary Care Providers:  MOJGAN Lyons, MOJGAN (General)     CHIEF COMPLAINT: ?Concern for history of iron deficiency anemia,  vitamin B12 deficiency, Woodrow-en-Y gastric bypass??   ?   HPI    Ms. Unger is a 43-year-old woman previously seen by nurse practitioner in our clinic for history of iron and vitamin B12 deficiency in setting of gastric bypass surgery 2016.  She notes previously receiving vitamin B12 supplement but discontinued.  Labs IV iron therapy May 2022.  She does endorse fatigue.  She asked about supplementation plan.    Review of Systems    ?   A comprehensive 14-point review of systems was reviewed with patient and was negative other than as specified above.   ?   PAST MEDICAL HISTORY:   Past Medical History:   Diagnosis Date    Anemia     Asthma     Childhood    Diabetes mellitus 2003    Fibromyalgia 2013    Heart disease     Hypertension 1996    Primary osteoarthritis involving multiple joints 3/6/2019    ?     PAST SURGICAL HISTORY:   Past Surgical History:   Procedure Laterality Date    CERVICAL DISC ARTHROPLASTY  04/2015    2 aritificial discs placed    GASTRIC BYPASS  07/2015    HYSTERECTOMY  2019    Lab band reversal  2014    LAPAROSCOPIC GASTRIC BANDING  2001    OOPHORECTOMY  2019    ROBOT-ASSISTED LAPAROSCOPIC HYSTERECTOMY      SINUS SURGERY  1996    SINUS SURGERY  2007    TONSILLECTOMY, ADENOIDECTOMY  1981      ?   ALLERGIES:   Allergies as of 05/08/2023 - Reviewed 05/08/2023   Allergen Reaction Noted    Amoxicillin-pot clavulanate      Codeine  06/17/2013    Codeine phosphate  11/22/2021    Nsaids (non-steroidal anti-inflammatory drug)        ?   MEDICATIONS:?   Outpatient Medications Marked as Taking for the 5/8/23 encounter (Office Visit) with Violette Cade MD   Medication Sig  Dispense Refill    ALPRAZolam (XANAX) 0.25 MG tablet alprazolam 0.25 mg tablet   Take 1 by mouth daily      amitriptyline (ELAVIL) 75 MG tablet Take 1 tablet (75 mg total) by mouth every evening. 30 tablet 11    buPROPion (WELLBUTRIN SR) 200 MG SR12 Take 200 mg by mouth once daily.      cetirizine (ZYRTEC) 10 MG tablet Take 10 mg by mouth daily as needed.       cevimeline (EVOXAC) 30 mg capsule Take 1 capsule (30 mg total) by mouth 3 (three) times daily. 90 capsule 11    dicyclomine (BENTYL) 20 mg tablet Take 20 mg by mouth as needed.       EPINEPHrine (EPIPEN) 0.3 mg/0.3 mL AtIn inject 0.3 mg by intramuscular route once as needed for anaphylaxis      eszopiclone (LUNESTA) 3 mg Tab eszopiclone 3 mg tablet   Take 1 tablet by mouth at bedtime      FLOVENT  mcg/actuation inhaler INHALE TWO PUFFS BY MOUTH TWICE DAILY FOR ASTHMA MAINTENANCE      linaCLOtide (LINZESS) 145 mcg Cap capsule Linzess 145 mcg capsule   TAKE ONE CAPSULE BY MOUTH 30 MINUTES PRIOR TO A MEAL      lisinopriL 10 MG tablet       metFORMIN (GLUCOPHAGE) 1000 MG tablet metformin 1,000 mg tablet   TAKE ONE TABLET BY MOUTH TWICE DAILY]      metoprolol succinate (TOPROL-XL) 25 MG 24 hr tablet Take 1 tablet by mouth every evening.      montelukast (SINGULAIR) 10 mg tablet Take 10 mg by mouth once daily.       naltrexone capsule Take 6 mg once at bedtime daily. 90 capsule 3    ondansetron (ZOFRAN-ODT) 4 MG TbDL Take 4 mg by mouth every 6 (six) hours as needed.      pantoprazole (PROTONIX) 40 MG tablet Take 40 mg by mouth 3 (three) times daily.      pregabalin (LYRICA) 75 MG capsule TAKE ONE CAPSULE BY MOUTH TWICE DAILY 60 capsule 5    rosuvastatin (CRESTOR) 10 MG tablet Take 10 mg by mouth once daily.  5    semaglutide (OZEMPIC) 0.25 mg or 0.5 mg (2 mg/3 mL) pen injector Inject 0.5 mg into the skin every 7 days.      tiZANidine (ZANAFLEX) 4 MG tablet TAKE ONE TABLET BY MOUTH EVERY 8 HOURS AS NEEDED FOR PAIN 90 tablet 11     topiramate (TOPAMAX) 100 MG tablet Take 1 tablet (100 mg total) by mouth 2 (two) times daily. 60 tablet 11    UBRELVY 100 mg tablet Take 1 tablet (100 mg total) by mouth every 72 hours as needed for Migraine (2 to 3 times max per week as needed). 10 tablet 2    VIIBRYD 40 mg Tab tablet Take 40 mg by mouth once daily.        ?   SOCIAL HISTORY:?   Social History     Tobacco Use    Smoking status: Former     Packs/day: 0.50     Years: 10.00     Pack years: 5.00     Types: Cigarettes     Quit date:      Years since quittin.3    Smokeless tobacco: Never   Substance Use Topics    Alcohol use: Yes     Comment: occ      ?      ?   FAMILY HISTORY:   family history includes Asthma in her paternal uncle; Cancer in her father, maternal grandfather, and mother; Hypertension in her mother.   ?        Objective:      Physical Exam      ?   Vitals:    23 1550   BP: 102/72   Pulse: 87   Resp: 18   Temp: 98.2 °F (36.8 °C)      ?   ECOG:?0  General appearance: Generally well appearing, in no acute distress.   Head, eyes, ears, nose, and throat: moist mucous membranes.   Respiratory:  Normal work of breathing  Abdomen: nontender, nondistended.   Extremities: Warm, without edema.   Neurologic: Alert and oriented.   Skin: No rashes, ecchymoses or petechial lesion.   Psychiatric:  Normal mood and affect.    ?   Laboratory:    Lab Results   Component Value Date    WBC 6.77 2023    RBC 4.03 2023    HGB 12.5 2023    HCT 39.2 2023    MCV 97 2023    MCH 31.0 2023    MCHC 31.9 (L) 2023    RDW 13.3 2023     2023    MPV 9.5 2023    GRAN 3.3 2023    GRAN 49.3 2023    LYMPH 2.7 2023    LYMPH 39.3 2023    MONO 0.6 2023    MONO 8.6 2023    EOS 0.1 2023    BASO 0.03 2023    EOSINOPHIL 2.1 2023    BASOPHIL 0.4 2023       Sodium   Date Value Ref Range Status   07/15/2022 140 136 - 145 mmol/L Final      Potassium   Date Value Ref Range Status   07/15/2022 4.2 3.5 - 5.1 mmol/L Final     Chloride   Date Value Ref Range Status   07/15/2022 110 95 - 110 mmol/L Final     CO2   Date Value Ref Range Status   07/15/2022 20 (L) 23 - 29 mmol/L Final     Glucose   Date Value Ref Range Status   07/15/2022 113 (H) 70 - 110 mg/dL Final     BUN   Date Value Ref Range Status   07/15/2022 10 6 - 20 mg/dL Final     Creatinine   Date Value Ref Range Status   07/15/2022 0.7 0.5 - 1.4 mg/dL Final     Calcium   Date Value Ref Range Status   07/15/2022 9.7 8.7 - 10.5 mg/dL Final     Total Protein   Date Value Ref Range Status   07/15/2022 7.5 6.0 - 8.4 g/dL Final     Albumin   Date Value Ref Range Status   07/15/2022 3.9 3.5 - 5.2 g/dL Final     Total Bilirubin   Date Value Ref Range Status   07/15/2022 0.2 0.1 - 1.0 mg/dL Final     Comment:     For infants and newborns, interpretation of results should be based  on gestational age, weight and in agreement with clinical  observations.    Premature Infant recommended reference ranges:  Up to 24 hours.............<8.0 mg/dL  Up to 48 hours............<12.0 mg/dL  3-5 days..................<15.0 mg/dL  6-29 days.................<15.0 mg/dL       Alkaline Phosphatase   Date Value Ref Range Status   07/15/2022 73 55 - 135 U/L Final     AST   Date Value Ref Range Status   07/15/2022 49 (H) 10 - 40 U/L Final     ALT   Date Value Ref Range Status   07/15/2022 54 (H) 10 - 44 U/L Final     Anion Gap   Date Value Ref Range Status   07/15/2022 10 8 - 16 mmol/L Final     eGFR if    Date Value Ref Range Status   07/15/2022 >60 >60 mL/min/1.73 m^2 Final     eGFR if non    Date Value Ref Range Status   07/15/2022 >60 >60 mL/min/1.73 m^2 Final     Comment:     Calculation used to obtain the estimated glomerular filtration  rate (eGFR) is the CKD-EPI equation.          Iron   Date Value Ref Range Status   07/15/2022 127 30 - 160 ug/dL Final     TIBC   Date Value Ref  Range Status   07/15/2022 351 250 - 450 ug/dL Final     Saturated Iron   Date Value Ref Range Status   07/15/2022 36 20 - 50 % Final     Ferritin   Date Value Ref Range Status   07/15/2022 368 (H) 20.0 - 300.0 ng/mL Final     Folate   Date Value Ref Range Status   10/03/2019 17.7 4.0 - 24.0 ng/mL Final     TSH   Date Value Ref Range Status   07/19/2021 0.657 0.400 - 4.000 uIU/mL Final           ?   Assessment/Plan:   H/O bariatric surgery  -     Vitamin B12; Future; Expected date: 05/08/2023    Iron deficiency anemia secondary to inadequate dietary iron intake  -     Vitamin B12; Future; Expected date: 05/08/2023  -     Iron and TIBC; Future; Expected date: 05/08/2023  -     Ferritin; Future; Expected date: 05/08/2023  -     CBC W/ AUTO DIFFERENTIAL; Future; Expected date: 05/08/2023    Other dietary vitamin B12 deficiency anemia  -     CBC W/ AUTO DIFFERENTIAL; Future; Expected date: 05/08/2023       1. H/O bariatric surgery    2. Iron deficiency anemia secondary to inadequate dietary iron intake    3. Other dietary vitamin B12 deficiency anemia          Plan:     Problem List Items Addressed This Visit    None  Visit Diagnoses       H/O bariatric surgery    -  Primary    Iron deficiency anemia secondary to inadequate dietary iron intake        Other dietary vitamin B12 deficiency anemia              History of iron and vitamin B12 deficiency in setting of Woodrow-en-Y gastric bypass 2016 malabsorptive.  We discussed EGD and colonoscopy and she notes having received these in the past and is up-to-date on age-appropriate cancer screening.  She denies any evidence of abnormal bleeding.  She does endorse prior menorrhagia but did have hysterectomy at age 39.  Recommend rechecking iron indices and vitamin B12 level.  Last IV iron therapy May 2022 with Feraheme tolerated well.  Given malabsorption will repeat IV iron therapy if iron deficiency.  Not currently on vitamin B12 supplement but may benefit from sublingual  formulation.    Follow-Up:   Route Chart for Scheduling    Med Onc Chart Routing      Follow up with physician 6 months.   Follow up with PARKER    Infusion scheduling note    Injection scheduling note    Labs CBC, ferritin, iron and TIBC and other   Scheduling:  Preferred lab:  Lab interval:  +Vitamin B12, couple days prior to visit   Imaging    Pharmacy appointment    Other referrals           Therapy Plan Information  Flushes  heparin, porcine (PF) 100 unit/mL injection flush 500 Units  500 Units, Intravenous, PRN  sodium chloride 0.9% flush 10 mL  10 mL, Intravenous, Every visit  PRN Medications  EPINEPHrine (EPIPEN) 0.3 mg/0.3 mL pen injection 0.3 mg  0.3 mg, Intramuscular, PRN  diphenhydrAMINE injection 50 mg  50 mg, Intravenous, PRN  methylPREDNISolone sodium succinate injection 125 mg  125 mg, Intravenous, PRN  sodium chloride 0.9% bolus 1,000 mL  1,000 mL, Intravenous, PRN

## 2023-05-11 DIAGNOSIS — D50.8 IRON DEFICIENCY ANEMIA SECONDARY TO INADEQUATE DIETARY IRON INTAKE: Primary | ICD-10-CM

## 2023-07-17 DIAGNOSIS — M35.09 SJOGREN'S SYNDROME WITH OTHER ORGAN INVOLVEMENT: ICD-10-CM

## 2023-07-17 DIAGNOSIS — M79.7 FIBROMYALGIA: ICD-10-CM

## 2023-07-17 RX ORDER — PREGABALIN 75 MG/1
CAPSULE ORAL
Qty: 60 CAPSULE | Refills: 5 | Status: SHIPPED | OUTPATIENT
Start: 2023-07-17 | End: 2024-02-05

## 2023-07-17 RX ORDER — CEVIMELINE HYDROCHLORIDE 30 MG/1
30 CAPSULE ORAL 3 TIMES DAILY
Qty: 90 CAPSULE | Refills: 11 | Status: SHIPPED | OUTPATIENT
Start: 2023-07-17 | End: 2024-07-16

## 2023-08-28 ENCOUNTER — OFFICE VISIT (OUTPATIENT)
Dept: RHEUMATOLOGY | Facility: CLINIC | Age: 43
End: 2023-08-28
Payer: COMMERCIAL

## 2023-08-28 DIAGNOSIS — M35.09 SJOGREN'S SYNDROME WITH OTHER ORGAN INVOLVEMENT: Primary | ICD-10-CM

## 2023-08-28 DIAGNOSIS — G93.32 CFS (CHRONIC FATIGUE SYNDROME): ICD-10-CM

## 2023-08-28 DIAGNOSIS — M79.7 FIBROMYALGIA: ICD-10-CM

## 2023-08-28 PROCEDURE — 4010F ACE/ARB THERAPY RXD/TAKEN: CPT | Mod: CPTII,95,, | Performed by: INTERNAL MEDICINE

## 2023-08-28 PROCEDURE — 1159F PR MEDICATION LIST DOCUMENTED IN MEDICAL RECORD: ICD-10-PCS | Mod: CPTII,95,, | Performed by: INTERNAL MEDICINE

## 2023-08-28 PROCEDURE — 1160F RVW MEDS BY RX/DR IN RCRD: CPT | Mod: CPTII,95,, | Performed by: INTERNAL MEDICINE

## 2023-08-28 PROCEDURE — 99213 OFFICE O/P EST LOW 20 MIN: CPT | Mod: 95,,, | Performed by: INTERNAL MEDICINE

## 2023-08-28 PROCEDURE — 1160F PR REVIEW ALL MEDS BY PRESCRIBER/CLIN PHARMACIST DOCUMENTED: ICD-10-PCS | Mod: CPTII,95,, | Performed by: INTERNAL MEDICINE

## 2023-08-28 PROCEDURE — 99213 PR OFFICE/OUTPT VISIT, EST, LEVL III, 20-29 MIN: ICD-10-PCS | Mod: 95,,, | Performed by: INTERNAL MEDICINE

## 2023-08-28 PROCEDURE — 4010F PR ACE/ARB THEARPY RXD/TAKEN: ICD-10-PCS | Mod: CPTII,95,, | Performed by: INTERNAL MEDICINE

## 2023-08-28 PROCEDURE — 1159F MED LIST DOCD IN RCRD: CPT | Mod: CPTII,95,, | Performed by: INTERNAL MEDICINE

## 2023-08-28 NOTE — PROGRESS NOTES
RHEUMATOLOGY FOLLOW UP - TELE VISIT     The patient location is: [place]  The chief complaint leading to consultation is:  Follow-up for fibromyalgia and Sjogren's.  Visit type: Virtual visit with synchronous audio and video  Total time spent with patient: 12 mins  Each patient to whom he or she provides medical services by telemedicine is:  (1) informed of the relationship between the physician and patient and the respective role of any other health care provider with respect to management of the patient; and (2) notified that he or she may decline to receive medical services by telemedicine and may withdraw from such care at any time.    Chief complaints, HPI, ROS, EXAM, Assessment & Plans:-  Shelby Mena a 43 y.o. pleasant female seen today for follow-up visit.  She reports doing well today.  No significant worsening pain or dry mouth.  Combination of low-dose naltrexone Lyrica have been helping with her fibromyalgia.  On Evoxac for dry mouth after failing pilocarpine.  Lost significant weight on Ozempic which has also improved her diabetes.  No parotid swelling.  No systemic B symptoms.  Rheumatological review of system negative otherwise.  Physical examination shows no parotid swelling.  No synovitis..    1. Sjogren's syndrome with other organ involvement    2. CFS (chronic fatigue syndrome)    3. Fibromyalgia      Problem List Items Addressed This Visit       Sjogren's disease - Primary    Fibromyalgia    CFS (chronic fatigue syndrome)       Stable sicca syndrome on cevimeline.  Continue the same.    Continue Lyrica and low-dose naltrexone for fibromyalgia.  # Follow up in about 1 year (around 8/28/2024).      Past Medical History:   Diagnosis Date    Anemia     Asthma     Childhood    Diabetes mellitus 2003    Fibromyalgia 2013    Heart disease     Hypertension 1996    Primary osteoarthritis involving multiple joints 3/6/2019       Past Surgical History:   Procedure Laterality Date    CERVICAL DISC  ARTHROPLASTY  2015    2 aritificial discs placed    GASTRIC BYPASS  2015    HYSTERECTOMY  2019    Lab band reversal  2014    LAPAROSCOPIC GASTRIC BANDING  2001    OOPHORECTOMY  2019    ROBOT-ASSISTED LAPAROSCOPIC HYSTERECTOMY      SINUS SURGERY  1996    SINUS SURGERY  2007    TONSILLECTOMY, ADENOIDECTOMY  1981        Social History     Tobacco Use    Smoking status: Former     Current packs/day: 0.00     Average packs/day: 0.5 packs/day for 10.0 years (5.0 ttl pk-yrs)     Types: Cigarettes     Start date:      Quit date:      Years since quittin.6    Smokeless tobacco: Never   Substance Use Topics    Alcohol use: Yes     Comment: occ    Drug use: No       Family History   Problem Relation Age of Onset    Cancer Mother     Hypertension Mother     Cancer Father     Asthma Paternal Uncle     Cancer Maternal Grandfather        Review of patient's allergies indicates:   Allergen Reactions    Amoxicillin-pot clavulanate     Codeine     Codeine phosphate     Nsaids (non-steroidal anti-inflammatory drug)        Medication List with Changes/Refills   Current Medications    ALPRAZOLAM (XANAX) 0.25 MG TABLET    alprazolam 0.25 mg tablet   Take 1 by mouth daily    AMITRIPTYLINE (ELAVIL) 75 MG TABLET    Take 1 tablet (75 mg total) by mouth every evening.    BUPROPION (WELLBUTRIN SR) 200 MG SR12    Take 200 mg by mouth once daily.    CETIRIZINE (ZYRTEC) 10 MG TABLET    Take 10 mg by mouth daily as needed.     CEVIMELINE (EVOXAC) 30 MG CAPSULE    Take 1 capsule (30 mg total) by mouth 3 (three) times daily.    DICYCLOMINE (BENTYL) 20 MG TABLET    Take 20 mg by mouth as needed.     EPINEPHRINE (EPIPEN) 0.3 MG/0.3 ML ATIN    inject 0.3 mg by intramuscular route once as needed for anaphylaxis    ESZOPICLONE (LUNESTA) 3 MG TAB    eszopiclone 3 mg tablet   Take 1 tablet by mouth at bedtime    FLOVENT  MCG/ACTUATION INHALER    INHALE TWO PUFFS BY MOUTH TWICE DAILY FOR ASTHMA MAINTENANCE    LINACLOTIDE (LINZESS)  145 MCG CAP CAPSULE    Linzess 145 mcg capsule   TAKE ONE CAPSULE BY MOUTH 30 MINUTES PRIOR TO A MEAL    LISINOPRIL 10 MG TABLET        METFORMIN (GLUCOPHAGE) 1000 MG TABLET    metformin 1,000 mg tablet   TAKE ONE TABLET BY MOUTH TWICE DAILY]    METOPROLOL SUCCINATE (TOPROL-XL) 25 MG 24 HR TABLET    Take 25 mg by mouth.    METOPROLOL SUCCINATE (TOPROL-XL) 25 MG 24 HR TABLET    Take 1 tablet by mouth every evening.    MONTELUKAST (SINGULAIR) 10 MG TABLET    Take 10 mg by mouth once daily.     NALTREXONE CAPSULE    Take 6 mg once at bedtime daily.    ONDANSETRON (ZOFRAN) 8 MG TABLET    Take 8 mg by mouth every 8 (eight) hours as needed.    ONDANSETRON (ZOFRAN-ODT) 4 MG TBDL    Take 4 mg by mouth every 6 (six) hours as needed.    PANTOPRAZOLE (PROTONIX) 40 MG TABLET    Take 40 mg by mouth 3 (three) times daily.    PREGABALIN (LYRICA) 75 MG CAPSULE    TAKE ONE CAPSULE BY MOUTH TWICE DAILY    ROSUVASTATIN (CRESTOR) 10 MG TABLET    Take 10 mg by mouth once daily.    SEMAGLUTIDE (OZEMPIC) 0.25 MG OR 0.5 MG (2 MG/3 ML) PEN INJECTOR    Inject 0.5 mg into the skin every 7 days.    SITAGLIPTIN (JANUVIA) 100 MG TAB    Januvia 100 mg tablet   TAKE ONE TABLET BY MOUTH DAILY    TIZANIDINE (ZANAFLEX) 4 MG TABLET    TAKE ONE TABLET BY MOUTH EVERY 8 HOURS AS NEEDED FOR PAIN    TOPIRAMATE (TOPAMAX) 100 MG TABLET    Take 1 tablet (100 mg total) by mouth 2 (two) times daily.    UBRELVY 100 MG TABLET    Take 1 tablet (100 mg total) by mouth every 72 hours as needed for Migraine (2 to 3 times max per week as needed).    VIIBRYD 40 MG TAB TABLET    Take 40 mg by mouth once daily.       Disclaimer: This note was prepared using voice recognition system and is likely to have sound alike errors and is not proof read.  Please call me with any questions.      Answers submitted by the patient for this visit:  Rheumatology Questionnaire (Submitted on 8/28/2023)  fever: No  eye redness: No  mouth sores: No  headaches: Yes  shortness of breath:  No  chest pain: No  trouble swallowing: No  diarrhea: No  constipation: Yes  unexpected weight change: No  genital sore: No  dysuria: No  During the last 3 days, have you had a skin rash?: Yes  Bruises or bleeds easily: No  cough: No

## 2023-09-15 ENCOUNTER — PATIENT MESSAGE (OUTPATIENT)
Dept: NEUROLOGY | Facility: CLINIC | Age: 43
End: 2023-09-15
Payer: COMMERCIAL

## 2023-09-15 DIAGNOSIS — G43.009 MIGRAINE WITHOUT AURA AND WITHOUT STATUS MIGRAINOSUS, NOT INTRACTABLE: ICD-10-CM

## 2023-09-15 RX ORDER — UBROGEPANT 100 MG/1
100 TABLET ORAL
Qty: 10 TABLET | Refills: 2 | Status: SHIPPED | OUTPATIENT
Start: 2023-09-15

## 2023-11-03 ENCOUNTER — LAB VISIT (OUTPATIENT)
Dept: LAB | Facility: HOSPITAL | Age: 43
End: 2023-11-03
Attending: INTERNAL MEDICINE
Payer: COMMERCIAL

## 2023-11-03 DIAGNOSIS — D51.3 OTHER DIETARY VITAMIN B12 DEFICIENCY ANEMIA: ICD-10-CM

## 2023-11-03 DIAGNOSIS — D50.8 IRON DEFICIENCY ANEMIA SECONDARY TO INADEQUATE DIETARY IRON INTAKE: ICD-10-CM

## 2023-11-03 LAB
BASOPHILS # BLD AUTO: 0.05 K/UL (ref 0–0.2)
BASOPHILS NFR BLD: 0.8 % (ref 0–1.9)
DIFFERENTIAL METHOD: ABNORMAL
EOSINOPHIL # BLD AUTO: 0.4 K/UL (ref 0–0.5)
EOSINOPHIL NFR BLD: 5.8 % (ref 0–8)
ERYTHROCYTE [DISTWIDTH] IN BLOOD BY AUTOMATED COUNT: 12.7 % (ref 11.5–14.5)
HCT VFR BLD AUTO: 41.1 % (ref 37–48.5)
HGB BLD-MCNC: 13.2 G/DL (ref 12–16)
IMM GRANULOCYTES # BLD AUTO: 0.02 K/UL (ref 0–0.04)
IMM GRANULOCYTES NFR BLD AUTO: 0.3 % (ref 0–0.5)
LYMPHOCYTES # BLD AUTO: 2.4 K/UL (ref 1–4.8)
LYMPHOCYTES NFR BLD: 36.6 % (ref 18–48)
MCH RBC QN AUTO: 31.7 PG (ref 27–31)
MCHC RBC AUTO-ENTMCNC: 32.1 G/DL (ref 32–36)
MCV RBC AUTO: 99 FL (ref 82–98)
MONOCYTES # BLD AUTO: 0.5 K/UL (ref 0.3–1)
MONOCYTES NFR BLD: 7.9 % (ref 4–15)
NEUTROPHILS # BLD AUTO: 3.2 K/UL (ref 1.8–7.7)
NEUTROPHILS NFR BLD: 48.6 % (ref 38–73)
NRBC BLD-RTO: 0 /100 WBC
PLATELET # BLD AUTO: 403 K/UL (ref 150–450)
PMV BLD AUTO: 10.1 FL (ref 9.2–12.9)
RBC # BLD AUTO: 4.16 M/UL (ref 4–5.4)
VIT B12 SERPL-MCNC: 238 PG/ML (ref 210–950)
WBC # BLD AUTO: 6.56 K/UL (ref 3.9–12.7)

## 2023-11-03 PROCEDURE — 82728 ASSAY OF FERRITIN: CPT | Performed by: INTERNAL MEDICINE

## 2023-11-03 PROCEDURE — 83540 ASSAY OF IRON: CPT | Performed by: INTERNAL MEDICINE

## 2023-11-03 PROCEDURE — 36415 COLL VENOUS BLD VENIPUNCTURE: CPT | Mod: PO | Performed by: INTERNAL MEDICINE

## 2023-11-03 PROCEDURE — 85025 COMPLETE CBC W/AUTO DIFF WBC: CPT | Performed by: INTERNAL MEDICINE

## 2023-11-03 PROCEDURE — 84466 ASSAY OF TRANSFERRIN: CPT | Performed by: INTERNAL MEDICINE

## 2023-11-03 PROCEDURE — 82607 VITAMIN B-12: CPT | Performed by: INTERNAL MEDICINE

## 2023-11-04 LAB
FERRITIN SERPL-MCNC: 31 NG/ML (ref 20–300)
IRON SERPL-MCNC: 52 UG/DL (ref 30–160)
SATURATED IRON: 13 % (ref 20–50)
TOTAL IRON BINDING CAPACITY: 407 UG/DL (ref 250–450)
TRANSFERRIN SERPL-MCNC: 275 MG/DL (ref 200–375)

## 2023-11-20 ENCOUNTER — OFFICE VISIT (OUTPATIENT)
Dept: HEMATOLOGY/ONCOLOGY | Facility: CLINIC | Age: 43
End: 2023-11-20
Payer: COMMERCIAL

## 2023-11-20 VITALS
BODY MASS INDEX: 30.18 KG/M2 | HEART RATE: 94 BPM | OXYGEN SATURATION: 99 % | DIASTOLIC BLOOD PRESSURE: 61 MMHG | HEIGHT: 65 IN | SYSTOLIC BLOOD PRESSURE: 87 MMHG | WEIGHT: 181.13 LBS | TEMPERATURE: 97 F

## 2023-11-20 DIAGNOSIS — D51.8 OTHER VITAMIN B12 DEFICIENCY ANEMIAS: ICD-10-CM

## 2023-11-20 DIAGNOSIS — D50.8 IRON DEFICIENCY ANEMIA SECONDARY TO INADEQUATE DIETARY IRON INTAKE: Primary | ICD-10-CM

## 2023-11-20 DIAGNOSIS — D50.8 OTHER IRON DEFICIENCY ANEMIAS: ICD-10-CM

## 2023-11-20 DIAGNOSIS — Z98.84 S/P BARIATRIC SURGERY: ICD-10-CM

## 2023-11-20 DIAGNOSIS — Z98.84 H/O BARIATRIC SURGERY: ICD-10-CM

## 2023-11-20 PROCEDURE — 1159F MED LIST DOCD IN RCRD: CPT | Mod: CPTII,S$GLB,, | Performed by: NURSE PRACTITIONER

## 2023-11-20 PROCEDURE — 4010F ACE/ARB THERAPY RXD/TAKEN: CPT | Mod: CPTII,S$GLB,, | Performed by: NURSE PRACTITIONER

## 2023-11-20 PROCEDURE — 1159F PR MEDICATION LIST DOCUMENTED IN MEDICAL RECORD: ICD-10-PCS | Mod: CPTII,S$GLB,, | Performed by: NURSE PRACTITIONER

## 2023-11-20 PROCEDURE — 3008F BODY MASS INDEX DOCD: CPT | Mod: CPTII,S$GLB,, | Performed by: NURSE PRACTITIONER

## 2023-11-20 PROCEDURE — 99999 PR PBB SHADOW E&M-EST. PATIENT-LVL III: ICD-10-PCS | Mod: PBBFAC,,, | Performed by: NURSE PRACTITIONER

## 2023-11-20 PROCEDURE — 3074F SYST BP LT 130 MM HG: CPT | Mod: CPTII,S$GLB,, | Performed by: NURSE PRACTITIONER

## 2023-11-20 PROCEDURE — 3078F PR MOST RECENT DIASTOLIC BLOOD PRESSURE < 80 MM HG: ICD-10-PCS | Mod: CPTII,S$GLB,, | Performed by: NURSE PRACTITIONER

## 2023-11-20 PROCEDURE — 99214 OFFICE O/P EST MOD 30 MIN: CPT | Mod: S$GLB,,, | Performed by: NURSE PRACTITIONER

## 2023-11-20 PROCEDURE — 99999 PR PBB SHADOW E&M-EST. PATIENT-LVL III: CPT | Mod: PBBFAC,,, | Performed by: NURSE PRACTITIONER

## 2023-11-20 PROCEDURE — 3008F PR BODY MASS INDEX (BMI) DOCUMENTED: ICD-10-PCS | Mod: CPTII,S$GLB,, | Performed by: NURSE PRACTITIONER

## 2023-11-20 PROCEDURE — 1160F PR REVIEW ALL MEDS BY PRESCRIBER/CLIN PHARMACIST DOCUMENTED: ICD-10-PCS | Mod: CPTII,S$GLB,, | Performed by: NURSE PRACTITIONER

## 2023-11-20 PROCEDURE — 4010F PR ACE/ARB THEARPY RXD/TAKEN: ICD-10-PCS | Mod: CPTII,S$GLB,, | Performed by: NURSE PRACTITIONER

## 2023-11-20 PROCEDURE — 99214 PR OFFICE/OUTPT VISIT, EST, LEVL IV, 30-39 MIN: ICD-10-PCS | Mod: S$GLB,,, | Performed by: NURSE PRACTITIONER

## 2023-11-20 PROCEDURE — 3078F DIAST BP <80 MM HG: CPT | Mod: CPTII,S$GLB,, | Performed by: NURSE PRACTITIONER

## 2023-11-20 PROCEDURE — 3074F PR MOST RECENT SYSTOLIC BLOOD PRESSURE < 130 MM HG: ICD-10-PCS | Mod: CPTII,S$GLB,, | Performed by: NURSE PRACTITIONER

## 2023-11-20 PROCEDURE — 1160F RVW MEDS BY RX/DR IN RCRD: CPT | Mod: CPTII,S$GLB,, | Performed by: NURSE PRACTITIONER

## 2023-11-20 RX ORDER — SODIUM CHLORIDE 0.9 % (FLUSH) 0.9 %
10 SYRINGE (ML) INJECTION
Status: CANCELLED | OUTPATIENT
Start: 2023-11-20

## 2023-11-20 RX ORDER — HEPARIN 100 UNIT/ML
500 SYRINGE INTRAVENOUS
Status: CANCELLED | OUTPATIENT
Start: 2023-12-14

## 2023-11-20 RX ORDER — CYANOCOBALAMIN 1000 UG/ML
1000 INJECTION, SOLUTION INTRAMUSCULAR; SUBCUTANEOUS
Status: CANCELLED | OUTPATIENT
Start: 2023-11-20

## 2023-11-20 RX ORDER — HEPARIN 100 UNIT/ML
500 SYRINGE INTRAVENOUS
Status: CANCELLED | OUTPATIENT
Start: 2023-11-20

## 2023-11-20 RX ORDER — FOLIC ACID 1 MG/1
1 TABLET ORAL DAILY
Qty: 100 TABLET | Refills: 3 | Status: SHIPPED | OUTPATIENT
Start: 2023-11-20 | End: 2024-11-19

## 2023-11-20 RX ORDER — SODIUM CHLORIDE 0.9 % (FLUSH) 0.9 %
10 SYRINGE (ML) INJECTION
Status: CANCELLED | OUTPATIENT
Start: 2023-12-14

## 2023-11-20 RX ORDER — METHYLPREDNISOLONE SOD SUCC 125 MG
40 VIAL (EA) INJECTION
Status: CANCELLED
Start: 2023-12-14

## 2023-11-20 RX ORDER — METHYLPREDNISOLONE SOD SUCC 125 MG
40 VIAL (EA) INJECTION
Status: CANCELLED
Start: 2023-11-20

## 2023-11-20 NOTE — PROGRESS NOTES
Subjective:       Patient ID: Shelby Meeks is a 43 y.o. female.    Chief Complaint: Review labs. Fatigue. Anemia    HPI: 43 y.o female with h/o gastric bypass, anemia, iron deficiency, B12 deficiency presenting today for follow up. She notes gradually worsening fatigue. Denies noting abnormal bleeding or anemia symptoms  Social History     Socioeconomic History    Marital status: Single   Occupational History    Occupation: Teacher   Tobacco Use    Smoking status: Former     Current packs/day: 0.00     Average packs/day: 0.5 packs/day for 10.0 years (5.0 ttl pk-yrs)     Types: Cigarettes     Start date:      Quit date:      Years since quittin.8    Smokeless tobacco: Never   Substance and Sexual Activity    Alcohol use: Yes     Comment: occ    Drug use: No    Sexual activity: Not Currently     Partners: Male     Birth control/protection: Abstinence, Injection       Past Medical History:   Diagnosis Date    Anemia     Asthma     Childhood    Diabetes mellitus 2003    Fibromyalgia 2013    Heart disease     Hypertension 1996    Primary osteoarthritis involving multiple joints 3/6/2019       Family History   Problem Relation Age of Onset    Cancer Mother     Hypertension Mother     Cancer Father     Asthma Paternal Uncle     Cancer Maternal Grandfather        Past Surgical History:   Procedure Laterality Date    CERVICAL DISC ARTHROPLASTY  2015    2 aritificial discs placed    GASTRIC BYPASS  2015    HYSTERECTOMY  2019    Lab band reversal      LAPAROSCOPIC GASTRIC BANDING  2001    OOPHORECTOMY  2019    ROBOT-ASSISTED LAPAROSCOPIC HYSTERECTOMY      SINUS SURGERY  1996    SINUS SURGERY  2007    TONSILLECTOMY, ADENOIDECTOMY  1981       Review of Systems   Constitutional:  Positive for fatigue. Negative for appetite change, chills, fever and unexpected weight change.   HENT:  Negative for congestion, mouth sores, nosebleeds, sore throat, trouble swallowing and  voice change.    Respiratory:  Negative for cough, chest tightness, shortness of breath and wheezing.    Cardiovascular:  Negative for chest pain and leg swelling.   Gastrointestinal:  Negative for abdominal distention, abdominal pain, blood in stool, constipation, diarrhea, nausea and vomiting.   Genitourinary:  Negative for difficulty urinating, dysuria and hematuria.   Musculoskeletal:  Negative for arthralgias, back pain and myalgias.   Skin:  Negative for pallor, rash and wound.   Neurological:  Negative for dizziness, syncope, weakness and headaches.   Hematological:  Negative for adenopathy. Does not bruise/bleed easily.   Psychiatric/Behavioral:  The patient is nervous/anxious.        Medication List with Changes/Refills   New Medications    FOLIC ACID (FOLVITE) 1 MG TABLET    Take 1 tablet (1 mg total) by mouth once daily.   Current Medications    ALPRAZOLAM (XANAX) 0.25 MG TABLET    alprazolam 0.25 mg tablet   Take 1 by mouth daily    AMITRIPTYLINE (ELAVIL) 75 MG TABLET    Take 1 tablet (75 mg total) by mouth every evening.    BUPROPION (WELLBUTRIN SR) 200 MG SR12    Take 200 mg by mouth once daily.    CETIRIZINE (ZYRTEC) 10 MG TABLET    Take 10 mg by mouth daily as needed.     CEVIMELINE (EVOXAC) 30 MG CAPSULE    Take 1 capsule (30 mg total) by mouth 3 (three) times daily.    DICYCLOMINE (BENTYL) 20 MG TABLET    Take 20 mg by mouth as needed.     EPINEPHRINE (EPIPEN) 0.3 MG/0.3 ML ATIN    inject 0.3 mg by intramuscular route once as needed for anaphylaxis    ESZOPICLONE (LUNESTA) 3 MG TAB    eszopiclone 3 mg tablet   Take 1 tablet by mouth at bedtime    FLOVENT  MCG/ACTUATION INHALER    INHALE TWO PUFFS BY MOUTH TWICE DAILY FOR ASTHMA MAINTENANCE    LINACLOTIDE (LINZESS) 145 MCG CAP CAPSULE    Linzess 145 mcg capsule   TAKE ONE CAPSULE BY MOUTH 30 MINUTES PRIOR TO A MEAL    LISINOPRIL 10 MG TABLET        METFORMIN (GLUCOPHAGE) 1000 MG TABLET    metformin 1,000 mg tablet   TAKE ONE TABLET BY MOUTH  TWICE DAILY]    METOPROLOL SUCCINATE (TOPROL-XL) 25 MG 24 HR TABLET    Take 25 mg by mouth.    METOPROLOL SUCCINATE (TOPROL-XL) 25 MG 24 HR TABLET    Take 1 tablet by mouth every evening.    MONTELUKAST (SINGULAIR) 10 MG TABLET    Take 10 mg by mouth once daily.     NALTREXONE CAPSULE    Take 6 mg once at bedtime daily.    ONDANSETRON (ZOFRAN) 8 MG TABLET    Take 8 mg by mouth every 8 (eight) hours as needed.    ONDANSETRON (ZOFRAN-ODT) 4 MG TBDL    Take 4 mg by mouth every 6 (six) hours as needed.    PANTOPRAZOLE (PROTONIX) 40 MG TABLET    Take 40 mg by mouth 3 (three) times daily.    PREGABALIN (LYRICA) 75 MG CAPSULE    TAKE ONE CAPSULE BY MOUTH TWICE DAILY    ROSUVASTATIN (CRESTOR) 10 MG TABLET    Take 10 mg by mouth once daily.    SEMAGLUTIDE (OZEMPIC) 0.25 MG OR 0.5 MG (2 MG/3 ML) PEN INJECTOR    Inject 0.5 mg into the skin every 7 days.    SITAGLIPTIN (JANUVIA) 100 MG TAB    Januvia 100 mg tablet   TAKE ONE TABLET BY MOUTH DAILY    TIZANIDINE (ZANAFLEX) 4 MG TABLET    TAKE ONE TABLET BY MOUTH EVERY 8 HOURS AS NEEDED FOR PAIN    TOPIRAMATE (TOPAMAX) 100 MG TABLET    Take 1 tablet (100 mg total) by mouth 2 (two) times daily.    UBRELVY 100 MG TABLET    Take 1 tablet (100 mg total) by mouth every 72 hours as needed for Migraine (2 to 3 times max per week as needed).    VIIBRYD 40 MG TAB TABLET    Take 40 mg by mouth once daily.     Objective:     Vitals:    11/20/23 1430   BP: (!) 87/61   Pulse: 94   Temp: 97.2 °F (36.2 °C)     Lab Results   Component Value Date    WBC 6.56 11/03/2023    HGB 13.2 11/03/2023    HCT 41.1 11/03/2023    MCV 99 (H) 11/03/2023     11/03/2023       BMP  Lab Results   Component Value Date     07/15/2022    K 4.2 07/15/2022     07/15/2022    CO2 20 (L) 07/15/2022    BUN 10 07/15/2022    CREATININE 0.7 07/15/2022    CALCIUM 9.7 07/15/2022    ANIONGAP 10 07/15/2022     Lab Results   Component Value Date    ALT 54 (H) 07/15/2022    AST 49 (H) 07/15/2022    ALKPHOS 73  07/15/2022    BILITOT 0.2 07/15/2022     Lab Results   Component Value Date    IRON 52 11/03/2023    TRANSFERRIN 275 11/03/2023    TIBC 407 11/03/2023    FESATURATED 13 (L) 11/03/2023      Lab Results   Component Value Date    BTIKBYDE98 238 11/03/2023     Lab Results   Component Value Date    FOLATE 17.7 10/03/2019         Physical Exam  Vitals reviewed.   Constitutional:       Appearance: She is well-developed.   HENT:      Head: Normocephalic.      Right Ear: External ear normal.      Left Ear: External ear normal.      Nose: Nose normal.   Eyes:      General: Lids are normal. No scleral icterus.        Right eye: No discharge.         Left eye: No discharge.      Conjunctiva/sclera: Conjunctivae normal.   Neck:      Thyroid: No thyroid mass.   Cardiovascular:      Rate and Rhythm: Normal rate and regular rhythm.      Heart sounds: Normal heart sounds.   Pulmonary:      Effort: Pulmonary effort is normal. No respiratory distress.      Breath sounds: Normal breath sounds.   Abdominal:      General: There is no distension.   Genitourinary:     Comments: deferred  Musculoskeletal:         General: Normal range of motion.      Cervical back: Normal range of motion.   Skin:     General: Skin is warm and dry.   Neurological:      Mental Status: She is alert and oriented to person, place, and time.   Psychiatric:         Speech: Speech normal.         Behavior: Behavior normal. Behavior is cooperative.         Thought Content: Thought content normal.        Assessment:     Problem List Items Addressed This Visit          Oncology    Other iron deficiency anemias     Interval decline in iron levels    Arrange feraheme weekly x 2. F/u 3 months with repeat labs         Other vitamin B12 deficiency anemias    Relevant Orders    CBC Auto Differential    Comprehensive Metabolic Panel    Iron and TIBC    Ferritin    Vitamin B12    Folate       Endocrine    S/P bariatric surgery     Gradual downtrend in B12 level, low normal.  Patient notes worsening fatigue    Arrange weekly B12 injections x 4. Followed by monthly           Other Visit Diagnoses       Iron deficiency anemia secondary to inadequate dietary iron intake    -  Primary    Relevant Orders    CBC Auto Differential    Comprehensive Metabolic Panel    Iron and TIBC    Ferritin    Vitamin B12    Folate    H/O bariatric surgery        Relevant Medications    folic acid (FOLVITE) 1 MG tablet    Other Relevant Orders    CBC Auto Differential    Comprehensive Metabolic Panel    Iron and TIBC    Ferritin    Vitamin B12    Folate              Plan:     Iron deficiency anemia secondary to inadequate dietary iron intake  -     CBC Auto Differential; Future; Expected date: 11/20/2023  -     Comprehensive Metabolic Panel; Future; Expected date: 11/20/2023  -     Iron and TIBC; Future; Expected date: 11/20/2023  -     Ferritin; Future; Expected date: 11/20/2023  -     Vitamin B12; Future; Expected date: 11/20/2023  -     Folate; Future; Expected date: 11/20/2023    H/O bariatric surgery  -     folic acid (FOLVITE) 1 MG tablet; Take 1 tablet (1 mg total) by mouth once daily.  Dispense: 100 tablet; Refill: 3  -     CBC Auto Differential; Future; Expected date: 11/20/2023  -     Comprehensive Metabolic Panel; Future; Expected date: 11/20/2023  -     Iron and TIBC; Future; Expected date: 11/20/2023  -     Ferritin; Future; Expected date: 11/20/2023  -     Vitamin B12; Future; Expected date: 11/20/2023  -     Folate; Future; Expected date: 11/20/2023    Other vitamin B12 deficiency anemias  -     CBC Auto Differential; Future; Expected date: 11/20/2023  -     Comprehensive Metabolic Panel; Future; Expected date: 11/20/2023  -     Iron and TIBC; Future; Expected date: 11/20/2023  -     Ferritin; Future; Expected date: 11/20/2023  -     Vitamin B12; Future; Expected date: 11/20/2023  -     Folate; Future; Expected date: 11/20/2023    S/P bariatric surgery    Other iron deficiency anemias    Other  orders  -     methylPREDNISolone sodium succinate injection 40 mg  -     ferumoxytoL (FERAHEME) 510 mg in dextrose 5 % (D5W) 100 mL IVPB  -     sodium chloride 0.9% 250 mL flush bag  -     sodium chloride 0.9% flush 10 mL  -     heparin, porcine (PF) 100 unit/mL injection flush 500 Units  -     alteplase injection 2 mg  -     methylPREDNISolone sodium succinate injection 40 mg  -     ferumoxytoL (FERAHEME) 510 mg in dextrose 5 % (D5W) 100 mL IVPB  -     sodium chloride 0.9% 250 mL flush bag  -     sodium chloride 0.9% flush 10 mL  -     heparin, porcine (PF) 100 unit/mL injection flush 500 Units  -     alteplase injection 2 mg  -     cyanocobalamin injection 1,000 mcg            Med Onc Chart Routing      Follow up with physician    Follow up with PARKER 3 months.   Infusion scheduling note   feraheme weekly x 2   Injection scheduling note weekly b12 x 4, then monthly   Labs CBC, CMP, ferritin, iron and TIBC, vitamin B12 and folate   Scheduling:  Preferred lab:  Lab interval:  1-2 days prior   Imaging None      Pharmacy appointment No pharmacy appointment needed      Other referrals       No additional referrals needed           CRISTA Wilson

## 2023-11-20 NOTE — ASSESSMENT & PLAN NOTE
Gradual downtrend in B12 level, low normal. Patient notes worsening fatigue    Arrange weekly B12 injections x 4. Followed by monthly

## 2023-11-30 ENCOUNTER — PATIENT MESSAGE (OUTPATIENT)
Dept: HEMATOLOGY/ONCOLOGY | Facility: CLINIC | Age: 43
End: 2023-11-30
Payer: COMMERCIAL

## 2023-12-13 ENCOUNTER — INFUSION (OUTPATIENT)
Dept: INFUSION THERAPY | Facility: HOSPITAL | Age: 43
End: 2023-12-13
Attending: INTERNAL MEDICINE
Payer: COMMERCIAL

## 2023-12-13 VITALS
DIASTOLIC BLOOD PRESSURE: 63 MMHG | HEART RATE: 91 BPM | TEMPERATURE: 98 F | OXYGEN SATURATION: 99 % | RESPIRATION RATE: 16 BRPM | SYSTOLIC BLOOD PRESSURE: 85 MMHG

## 2023-12-13 DIAGNOSIS — D51.8 OTHER VITAMIN B12 DEFICIENCY ANEMIAS: ICD-10-CM

## 2023-12-13 DIAGNOSIS — Z98.84 S/P BARIATRIC SURGERY: ICD-10-CM

## 2023-12-13 DIAGNOSIS — D50.8 OTHER IRON DEFICIENCY ANEMIAS: Primary | ICD-10-CM

## 2023-12-13 PROCEDURE — 96365 THER/PROPH/DIAG IV INF INIT: CPT

## 2023-12-13 PROCEDURE — 96375 TX/PRO/DX INJ NEW DRUG ADDON: CPT

## 2023-12-13 PROCEDURE — 96372 THER/PROPH/DIAG INJ SC/IM: CPT

## 2023-12-13 PROCEDURE — 63600175 PHARM REV CODE 636 W HCPCS: Performed by: NURSE PRACTITIONER

## 2023-12-13 PROCEDURE — 96374 THER/PROPH/DIAG INJ IV PUSH: CPT

## 2023-12-13 PROCEDURE — 25000003 PHARM REV CODE 250: Performed by: NURSE PRACTITIONER

## 2023-12-13 RX ORDER — CYANOCOBALAMIN 1000 UG/ML
1000 INJECTION, SOLUTION INTRAMUSCULAR; SUBCUTANEOUS
Status: COMPLETED | OUTPATIENT
Start: 2023-12-13 | End: 2023-12-13

## 2023-12-13 RX ORDER — METHYLPREDNISOLONE SOD SUCC 125 MG
40 VIAL (EA) INJECTION
Status: COMPLETED | OUTPATIENT
Start: 2023-12-13 | End: 2023-12-13

## 2023-12-13 RX ORDER — CYANOCOBALAMIN 1000 UG/ML
1000 INJECTION, SOLUTION INTRAMUSCULAR; SUBCUTANEOUS
Status: CANCELLED | OUTPATIENT
Start: 2023-12-13

## 2023-12-13 RX ADMIN — FERUMOXYTOL 510 MG: 510 INJECTION INTRAVENOUS at 02:12

## 2023-12-13 RX ADMIN — CYANOCOBALAMIN 1000 MCG: 1000 INJECTION INTRAMUSCULAR; SUBCUTANEOUS at 03:12

## 2023-12-13 RX ADMIN — METHYLPREDNISOLONE SODIUM SUCCINATE 40 MG: 125 INJECTION, POWDER, FOR SOLUTION INTRAMUSCULAR; INTRAVENOUS at 02:12

## 2023-12-13 NOTE — NURSING
"Patient tolerated infusion well without any adverse effects. BP was hypotensive, asymptomatic. Patient stated, "I am always really low and everyone always ask if I feel bad or dizzy, I never do, should I be"? I encouraged her to send a message to her PCP and let him know and she may need a medication adjustment. She stated she takes lisinopril 10mg & metoprolol XL 25mg. Patient stated she will send a message and also start taking her BP before taking her morning dose of lisinopril.    "

## 2023-12-13 NOTE — DISCHARGE INSTRUCTIONS
.[facility]  [address] [facility]  [address]  [phone] phone     [phone] fax  Hours of Operation: Monday- Friday 8:00am- 5:00pm  After hours phone  [phone]  Hematology / Oncology Physicians on call    Dr. Manny Diaz      Nurse Practitioners:    iKm Royal, TRISHA Bernal, TRISHA Morgan, TRISHA Veliz, TRISHA Farmer, PA      Please don't hesitate to call if you have any concerns.     .FALL PREVENTION   Falls often occur due to slipping, tripping or losing your balance. Here are ways to reduce your risk of falling again.   Was there anything that caused your fall that can be fixed, removed or replaced?   Make your home safe by keeping walkways clear of objects you may trip over.   Use non-slip pads under rugs.   Do not walk in poorly lit areas.   Do not stand on chairs or wobbly ladders.   Use caution when reaching overhead or looking upward. This position can cause a loss of balance.   Be sure your shoes fit properly, have non-slip bottoms and are in good condition.   Be cautious when going up and down stairs, curbs, and when walking on uneven sidewalks.   If your balance is poor, consider using a cane or walker.   If your fall was related to alcohol use, stop or limit alcohol intake.   If your fall was related to use of sleeping medicines, talk to your doctor about this. You may need to reduce your dosage at bedtime if you awaken during the night to go to the bathroom.   To reduce the need for nighttime bathroom trips:   Avoid drinking fluids for several hours before going to bed   Empty your bladder before going to bed   Men can keep a urinal at the bedside   © 4001-5647 [facility], [address] [place] [address], [place] [address]. All rights reserved. This information is not intended as a substitute for professional medical care. Always follow your healthcare professional's instructions.

## 2023-12-13 NOTE — PLAN OF CARE
Patient reports feeling tired today and having a migraine. Tolerated iron infusion well with no adverse reactions. Patient to return in for second dose of feraheme and B12.

## 2023-12-20 ENCOUNTER — INFUSION (OUTPATIENT)
Dept: INFUSION THERAPY | Facility: HOSPITAL | Age: 43
End: 2023-12-20
Attending: INTERNAL MEDICINE
Payer: COMMERCIAL

## 2023-12-20 VITALS
RESPIRATION RATE: 16 BRPM | DIASTOLIC BLOOD PRESSURE: 66 MMHG | OXYGEN SATURATION: 98 % | SYSTOLIC BLOOD PRESSURE: 94 MMHG | TEMPERATURE: 98 F | HEART RATE: 89 BPM

## 2023-12-20 DIAGNOSIS — D51.8 OTHER VITAMIN B12 DEFICIENCY ANEMIAS: ICD-10-CM

## 2023-12-20 DIAGNOSIS — D50.8 OTHER IRON DEFICIENCY ANEMIAS: Primary | ICD-10-CM

## 2023-12-20 DIAGNOSIS — Z98.84 S/P BARIATRIC SURGERY: ICD-10-CM

## 2023-12-20 PROCEDURE — 25000003 PHARM REV CODE 250: Performed by: NURSE PRACTITIONER

## 2023-12-20 PROCEDURE — 96372 THER/PROPH/DIAG INJ SC/IM: CPT

## 2023-12-20 PROCEDURE — 96374 THER/PROPH/DIAG INJ IV PUSH: CPT

## 2023-12-20 PROCEDURE — 63600175 PHARM REV CODE 636 W HCPCS: Mod: JZ,JG | Performed by: NURSE PRACTITIONER

## 2023-12-20 RX ORDER — CYANOCOBALAMIN 1000 UG/ML
1000 INJECTION, SOLUTION INTRAMUSCULAR; SUBCUTANEOUS
Status: CANCELLED | OUTPATIENT
Start: 2023-12-20

## 2023-12-20 RX ORDER — CYANOCOBALAMIN 1000 UG/ML
1000 INJECTION, SOLUTION INTRAMUSCULAR; SUBCUTANEOUS
Status: COMPLETED | OUTPATIENT
Start: 2023-12-20 | End: 2023-12-20

## 2023-12-20 RX ORDER — METHYLPREDNISOLONE SOD SUCC 125 MG
40 VIAL (EA) INJECTION
Status: DISCONTINUED | OUTPATIENT
Start: 2023-12-20 | End: 2023-12-20 | Stop reason: HOSPADM

## 2023-12-20 RX ADMIN — FERUMOXYTOL 510 MG: 510 INJECTION INTRAVENOUS at 02:12

## 2023-12-20 RX ADMIN — CYANOCOBALAMIN 1000 MCG: 1000 INJECTION INTRAMUSCULAR; SUBCUTANEOUS at 02:12

## 2023-12-20 NOTE — DISCHARGE INSTRUCTIONS
[facility]  [address] [facility]  [address]  [phone] phone     [phone] fax  Hours of Operation: Monday- Friday 8:00am- 5:00pm  After hours phone  [phone]  Hematology / Oncology Physicians on call      CHRISS Ferrara Dr., NP Sydney Prescott, TRISHA Andre FNP    Please call with any concerns regarding your appointment today.

## 2023-12-20 NOTE — NURSING
Patient said she didn't want the steroid that it had made her feel very bad and she doesn't like it. She understood why it was ordered and said that she wanted to try it without .SHEHERHERS tolerated the infusion without any complications. VSS. NAD. Left without incident.

## 2023-12-20 NOTE — PLAN OF CARE
Problem: Adult Inpatient Plan of Care  Goal: Plan of Care Review  Outcome: Ongoing, Progressing  Flowsheets (Taken 12/20/2023 1502)  Plan of Care Reviewed With: patient  Goal: Patient-Specific Goal (Individualized)  Outcome: Ongoing, Progressing  Flowsheets (Taken 12/20/2023 1502)  Anxieties, Fears or Concerns: doesn't want the steroid  Individualized Care Needs: feet up  Goal: Optimal Comfort and Wellbeing  Outcome: Ongoing, Progressing  Intervention: Provide Person-Centered Care  Flowsheets (Taken 12/20/2023 1502)  Trust Relationship/Rapport:   care explained   reassurance provided   choices provided   emotional support provided   empathic listening provided   questions answered   questions encouraged

## 2023-12-27 ENCOUNTER — INFUSION (OUTPATIENT)
Dept: INFUSION THERAPY | Facility: HOSPITAL | Age: 43
End: 2023-12-27
Attending: NURSE PRACTITIONER
Payer: COMMERCIAL

## 2023-12-27 VITALS
HEART RATE: 89 BPM | RESPIRATION RATE: 16 BRPM | SYSTOLIC BLOOD PRESSURE: 108 MMHG | OXYGEN SATURATION: 97 % | TEMPERATURE: 98 F | DIASTOLIC BLOOD PRESSURE: 70 MMHG

## 2023-12-27 DIAGNOSIS — Z98.84 S/P BARIATRIC SURGERY: ICD-10-CM

## 2023-12-27 DIAGNOSIS — D50.8 OTHER IRON DEFICIENCY ANEMIAS: Primary | ICD-10-CM

## 2023-12-27 DIAGNOSIS — D51.8 OTHER VITAMIN B12 DEFICIENCY ANEMIAS: ICD-10-CM

## 2023-12-27 PROCEDURE — 63600175 PHARM REV CODE 636 W HCPCS: Performed by: NURSE PRACTITIONER

## 2023-12-27 PROCEDURE — 96372 THER/PROPH/DIAG INJ SC/IM: CPT

## 2023-12-27 RX ORDER — CYANOCOBALAMIN 1000 UG/ML
1000 INJECTION, SOLUTION INTRAMUSCULAR; SUBCUTANEOUS
Status: COMPLETED | OUTPATIENT
Start: 2023-12-27 | End: 2023-12-27

## 2023-12-27 RX ORDER — CYANOCOBALAMIN 1000 UG/ML
1000 INJECTION, SOLUTION INTRAMUSCULAR; SUBCUTANEOUS
Status: CANCELLED | OUTPATIENT
Start: 2023-12-27

## 2023-12-27 RX ADMIN — CYANOCOBALAMIN 1000 MCG: 1000 INJECTION INTRAMUSCULAR; SUBCUTANEOUS at 02:12

## 2023-12-27 NOTE — DISCHARGE INSTRUCTIONS
.THANKS FOR ALLOWING ME TO CARE FOR YOU!!!! ~Maria Isabel          THANKS FOR CHOOSING [facility]!!!        [facility]  [address] [facility]  [id number] [place] [address] Drive  [phone] phone     [phone] fax  Hours of Operation: Monday- Friday 8:00am- 5:00pm  After hours phone  [phone]  Hematology / Oncology Physicians on call      Dr. Manny Steel, TRISHA Emery NP Khelsea Conley, TRISHA    Please call with any concerns regarding your appointment today.

## 2023-12-27 NOTE — NURSING
Patient to unit today for B12 injection.  B12 Injection given without difficulties to left arm.Bandaid applied. Patient instructed to stay in the clinic for 15 minutes. Patient verbalized understanding and will notify nurse with any complaints.

## 2024-01-05 ENCOUNTER — INFUSION (OUTPATIENT)
Dept: INFUSION THERAPY | Facility: HOSPITAL | Age: 44
End: 2024-01-05
Attending: NURSE PRACTITIONER
Payer: COMMERCIAL

## 2024-01-05 DIAGNOSIS — Z98.84 S/P BARIATRIC SURGERY: ICD-10-CM

## 2024-01-05 DIAGNOSIS — D50.8 OTHER IRON DEFICIENCY ANEMIAS: Primary | ICD-10-CM

## 2024-01-05 DIAGNOSIS — D51.8 OTHER VITAMIN B12 DEFICIENCY ANEMIAS: ICD-10-CM

## 2024-01-05 PROCEDURE — 63600175 PHARM REV CODE 636 W HCPCS: Performed by: NURSE PRACTITIONER

## 2024-01-05 PROCEDURE — 96372 THER/PROPH/DIAG INJ SC/IM: CPT

## 2024-01-05 RX ORDER — CYANOCOBALAMIN 1000 UG/ML
1000 INJECTION, SOLUTION INTRAMUSCULAR; SUBCUTANEOUS
OUTPATIENT
Start: 2024-01-05

## 2024-01-05 RX ORDER — CYANOCOBALAMIN 1000 UG/ML
1000 INJECTION, SOLUTION INTRAMUSCULAR; SUBCUTANEOUS
Status: COMPLETED | OUTPATIENT
Start: 2024-01-05 | End: 2024-01-05

## 2024-01-05 RX ADMIN — CYANOCOBALAMIN 1000 MCG: 1000 INJECTION INTRAMUSCULAR; SUBCUTANEOUS at 02:01

## 2024-02-04 DIAGNOSIS — M35.09 SJOGREN'S SYNDROME WITH OTHER ORGAN INVOLVEMENT: ICD-10-CM

## 2024-02-04 DIAGNOSIS — G93.32 CFS (CHRONIC FATIGUE SYNDROME): ICD-10-CM

## 2024-02-04 DIAGNOSIS — M79.7 FIBROMYALGIA: ICD-10-CM

## 2024-02-05 RX ORDER — PREGABALIN 75 MG/1
CAPSULE ORAL
Qty: 60 CAPSULE | Refills: 5 | Status: SHIPPED | OUTPATIENT
Start: 2024-02-05

## 2024-02-05 RX ORDER — TIZANIDINE 4 MG/1
TABLET ORAL
Qty: 90 TABLET | Refills: 11 | Status: SHIPPED | OUTPATIENT
Start: 2024-02-05

## 2024-02-22 ENCOUNTER — TELEPHONE (OUTPATIENT)
Dept: HEMATOLOGY/ONCOLOGY | Facility: CLINIC | Age: 44
End: 2024-02-22
Payer: COMMERCIAL

## 2024-02-22 NOTE — TELEPHONE ENCOUNTER
----- Message from Janeenjanice Ojedade sent at 2/22/2024 10:47 AM CST -----  Type:  Patient Returning Call    Who Called:pt   Who Left Message for Patient:  Does the patient know what this is regarding?:appts   Would the patient rather a call back or a response via [facility]? No preference   Best Call Back Number:[phone]  Additional Information: states that she would like to reschedule her appts for march 25-29 and would also like to schedule her injections as well.

## 2024-02-22 NOTE — TELEPHONE ENCOUNTER
Spoke with patient, stated she wants to move her lab appointment to March 25th and have her appointment with Ailyn Bernal NP as well as her injection on March 28th or 29th.

## 2024-03-06 DIAGNOSIS — G43.009 MIGRAINE WITHOUT AURA AND WITHOUT STATUS MIGRAINOSUS, NOT INTRACTABLE: ICD-10-CM

## 2024-03-06 RX ORDER — TOPIRAMATE 100 MG/1
100 TABLET, FILM COATED ORAL 2 TIMES DAILY
Qty: 60 TABLET | Refills: 11 | Status: SHIPPED | OUTPATIENT
Start: 2024-03-06 | End: 2025-03-06

## 2024-03-06 RX ORDER — AMITRIPTYLINE HYDROCHLORIDE 75 MG/1
75 TABLET ORAL NIGHTLY
Qty: 30 TABLET | Refills: 11 | Status: SHIPPED | OUTPATIENT
Start: 2024-03-06 | End: 2025-03-06

## 2024-03-25 ENCOUNTER — LAB VISIT (OUTPATIENT)
Dept: LAB | Facility: HOSPITAL | Age: 44
End: 2024-03-25
Attending: NURSE PRACTITIONER
Payer: COMMERCIAL

## 2024-03-25 DIAGNOSIS — D50.8 IRON DEFICIENCY ANEMIA SECONDARY TO INADEQUATE DIETARY IRON INTAKE: ICD-10-CM

## 2024-03-25 DIAGNOSIS — D51.8 OTHER VITAMIN B12 DEFICIENCY ANEMIAS: ICD-10-CM

## 2024-03-25 DIAGNOSIS — Z98.84 H/O BARIATRIC SURGERY: ICD-10-CM

## 2024-03-25 LAB
ALBUMIN SERPL BCP-MCNC: 4.1 G/DL (ref 3.5–5.2)
ALP SERPL-CCNC: 77 U/L (ref 55–135)
ALT SERPL W/O P-5'-P-CCNC: 118 U/L (ref 10–44)
ANION GAP SERPL CALC-SCNC: 10 MMOL/L (ref 8–16)
AST SERPL-CCNC: 78 U/L (ref 10–40)
BASOPHILS # BLD AUTO: 0.05 K/UL (ref 0–0.2)
BASOPHILS NFR BLD: 0.7 % (ref 0–1.9)
BILIRUB SERPL-MCNC: 0.2 MG/DL (ref 0.1–1)
BUN SERPL-MCNC: 11 MG/DL (ref 6–20)
CALCIUM SERPL-MCNC: 9.5 MG/DL (ref 8.7–10.5)
CHLORIDE SERPL-SCNC: 113 MMOL/L (ref 95–110)
CO2 SERPL-SCNC: 18 MMOL/L (ref 23–29)
CREAT SERPL-MCNC: 0.8 MG/DL (ref 0.5–1.4)
DIFFERENTIAL METHOD BLD: ABNORMAL
EOSINOPHIL # BLD AUTO: 0.1 K/UL (ref 0–0.5)
EOSINOPHIL NFR BLD: 1.5 % (ref 0–8)
ERYTHROCYTE [DISTWIDTH] IN BLOOD BY AUTOMATED COUNT: 12.5 % (ref 11.5–14.5)
EST. GFR  (NO RACE VARIABLE): >60 ML/MIN/1.73 M^2
FERRITIN SERPL-MCNC: 227 NG/ML (ref 20–300)
FOLATE SERPL-MCNC: 36.2 NG/ML (ref 4–24)
GLUCOSE SERPL-MCNC: 84 MG/DL (ref 70–110)
HCT VFR BLD AUTO: 43.2 % (ref 37–48.5)
HGB BLD-MCNC: 14.6 G/DL (ref 12–16)
IMM GRANULOCYTES # BLD AUTO: 0.02 K/UL (ref 0–0.04)
IMM GRANULOCYTES NFR BLD AUTO: 0.3 % (ref 0–0.5)
IRON SERPL-MCNC: 122 UG/DL (ref 30–160)
LYMPHOCYTES # BLD AUTO: 2.6 K/UL (ref 1–4.8)
LYMPHOCYTES NFR BLD: 35.3 % (ref 18–48)
MCH RBC QN AUTO: 32 PG (ref 27–31)
MCHC RBC AUTO-ENTMCNC: 33.8 G/DL (ref 32–36)
MCV RBC AUTO: 95 FL (ref 82–98)
MONOCYTES # BLD AUTO: 0.5 K/UL (ref 0.3–1)
MONOCYTES NFR BLD: 6.4 % (ref 4–15)
NEUTROPHILS # BLD AUTO: 4.1 K/UL (ref 1.8–7.7)
NEUTROPHILS NFR BLD: 55.8 % (ref 38–73)
NRBC BLD-RTO: 0 /100 WBC
PLATELET # BLD AUTO: 392 K/UL (ref 150–450)
PMV BLD AUTO: 9.6 FL (ref 9.2–12.9)
POTASSIUM SERPL-SCNC: 3.8 MMOL/L (ref 3.5–5.1)
PROT SERPL-MCNC: 7.1 G/DL (ref 6–8.4)
RBC # BLD AUTO: 4.56 M/UL (ref 4–5.4)
SATURATED IRON: 33 % (ref 20–50)
SODIUM SERPL-SCNC: 141 MMOL/L (ref 136–145)
TOTAL IRON BINDING CAPACITY: 367 UG/DL (ref 250–450)
TRANSFERRIN SERPL-MCNC: 248 MG/DL (ref 200–375)
VIT B12 SERPL-MCNC: 375 PG/ML (ref 210–950)
WBC # BLD AUTO: 7.37 K/UL (ref 3.9–12.7)

## 2024-03-25 PROCEDURE — 82607 VITAMIN B-12: CPT | Performed by: NURSE PRACTITIONER

## 2024-03-25 PROCEDURE — 80053 COMPREHEN METABOLIC PANEL: CPT | Performed by: NURSE PRACTITIONER

## 2024-03-25 PROCEDURE — 83540 ASSAY OF IRON: CPT | Performed by: NURSE PRACTITIONER

## 2024-03-25 PROCEDURE — 36415 COLL VENOUS BLD VENIPUNCTURE: CPT | Mod: PO | Performed by: NURSE PRACTITIONER

## 2024-03-25 PROCEDURE — 82746 ASSAY OF FOLIC ACID SERUM: CPT | Performed by: NURSE PRACTITIONER

## 2024-03-25 PROCEDURE — 82728 ASSAY OF FERRITIN: CPT | Performed by: NURSE PRACTITIONER

## 2024-03-25 PROCEDURE — 85025 COMPLETE CBC W/AUTO DIFF WBC: CPT | Performed by: NURSE PRACTITIONER

## 2024-03-28 ENCOUNTER — LAB VISIT (OUTPATIENT)
Dept: LAB | Facility: HOSPITAL | Age: 44
End: 2024-03-28
Attending: NURSE PRACTITIONER
Payer: COMMERCIAL

## 2024-03-28 ENCOUNTER — OFFICE VISIT (OUTPATIENT)
Dept: HEMATOLOGY/ONCOLOGY | Facility: CLINIC | Age: 44
End: 2024-03-28
Payer: COMMERCIAL

## 2024-03-28 ENCOUNTER — INFUSION (OUTPATIENT)
Dept: INFUSION THERAPY | Facility: HOSPITAL | Age: 44
End: 2024-03-28
Attending: NURSE PRACTITIONER
Payer: COMMERCIAL

## 2024-03-28 VITALS
DIASTOLIC BLOOD PRESSURE: 66 MMHG | SYSTOLIC BLOOD PRESSURE: 96 MMHG | HEART RATE: 87 BPM | HEART RATE: 86 BPM | DIASTOLIC BLOOD PRESSURE: 65 MMHG | SYSTOLIC BLOOD PRESSURE: 91 MMHG | RESPIRATION RATE: 18 BRPM | BODY MASS INDEX: 30.8 KG/M2 | OXYGEN SATURATION: 99 % | TEMPERATURE: 98 F | WEIGHT: 184.88 LBS | HEIGHT: 65 IN | OXYGEN SATURATION: 99 % | TEMPERATURE: 97 F

## 2024-03-28 DIAGNOSIS — R74.8 ELEVATED LIVER ENZYMES: ICD-10-CM

## 2024-03-28 DIAGNOSIS — D51.8 OTHER VITAMIN B12 DEFICIENCY ANEMIAS: ICD-10-CM

## 2024-03-28 DIAGNOSIS — Z98.84 S/P BARIATRIC SURGERY: ICD-10-CM

## 2024-03-28 DIAGNOSIS — D50.8 OTHER IRON DEFICIENCY ANEMIAS: Primary | ICD-10-CM

## 2024-03-28 PROCEDURE — 99999 PR PBB SHADOW E&M-EST. PATIENT-LVL III: CPT | Mod: PBBFAC,,, | Performed by: NURSE PRACTITIONER

## 2024-03-28 PROCEDURE — 99214 OFFICE O/P EST MOD 30 MIN: CPT | Mod: 25,S$GLB,, | Performed by: NURSE PRACTITIONER

## 2024-03-28 PROCEDURE — 96372 THER/PROPH/DIAG INJ SC/IM: CPT

## 2024-03-28 PROCEDURE — 4010F ACE/ARB THERAPY RXD/TAKEN: CPT | Mod: CPTII,S$GLB,, | Performed by: NURSE PRACTITIONER

## 2024-03-28 PROCEDURE — 36415 COLL VENOUS BLD VENIPUNCTURE: CPT | Performed by: NURSE PRACTITIONER

## 2024-03-28 PROCEDURE — 3008F BODY MASS INDEX DOCD: CPT | Mod: CPTII,S$GLB,, | Performed by: NURSE PRACTITIONER

## 2024-03-28 PROCEDURE — 86704 HEP B CORE ANTIBODY TOTAL: CPT | Performed by: NURSE PRACTITIONER

## 2024-03-28 PROCEDURE — 80074 ACUTE HEPATITIS PANEL: CPT | Performed by: NURSE PRACTITIONER

## 2024-03-28 PROCEDURE — 3078F DIAST BP <80 MM HG: CPT | Mod: CPTII,S$GLB,, | Performed by: NURSE PRACTITIONER

## 2024-03-28 PROCEDURE — 3074F SYST BP LT 130 MM HG: CPT | Mod: CPTII,S$GLB,, | Performed by: NURSE PRACTITIONER

## 2024-03-28 PROCEDURE — 63600175 PHARM REV CODE 636 W HCPCS: Performed by: NURSE PRACTITIONER

## 2024-03-28 RX ORDER — CYANOCOBALAMIN 1000 UG/ML
1000 INJECTION, SOLUTION INTRAMUSCULAR; SUBCUTANEOUS
Status: CANCELLED | OUTPATIENT
Start: 2024-03-28

## 2024-03-28 RX ORDER — CYANOCOBALAMIN 1000 UG/ML
1000 INJECTION, SOLUTION INTRAMUSCULAR; SUBCUTANEOUS
Status: COMPLETED | OUTPATIENT
Start: 2024-03-28 | End: 2024-03-28

## 2024-03-28 RX ADMIN — CYANOCOBALAMIN 1000 MCG: 1000 INJECTION INTRAMUSCULAR; SUBCUTANEOUS at 03:03

## 2024-03-28 NOTE — ASSESSMENT & PLAN NOTE
Resolution of iron deficiency s/p weekly Feraheme x 2    F/u 3 months with repeat labs. Sooner f/u PRN

## 2024-03-28 NOTE — PROGRESS NOTES
Subjective:       Patient ID: Shelby Meeks is a 44 y.o. female.    Chief Complaint: Review labs.iron deficiency s/p weekly Feraheme x 2    HPI: 44 y.o female with h/o gastric bypass, anemia, iron deficiency, B12 deficiency presenting today for follow up following recent iron infusions. She feels well. Notes intermittent abdominal pain. Denies ETOH outside of occasional wine.  Social History     Socioeconomic History    Marital status: Single   Occupational History    Occupation: Teacher   Tobacco Use    Smoking status: Former     Current packs/day: 0.00     Average packs/day: 0.5 packs/day for 10.0 years (5.0 ttl pk-yrs)     Types: Cigarettes     Start date: 1999     Quit date: 2009     Years since quitting: 15.2    Smokeless tobacco: Never   Substance and Sexual Activity    Alcohol use: Yes     Comment: occ    Drug use: No    Sexual activity: Not Currently     Partners: Male     Birth control/protection: Abstinence, Injection       Past Medical History:   Diagnosis Date    Anemia     Asthma     Childhood    Diabetes mellitus 2003    Fibromyalgia 2013    Heart disease     Hypertension 1996    Primary osteoarthritis involving multiple joints 3/6/2019       Family History   Problem Relation Age of Onset    Cancer Mother     Hypertension Mother     Cancer Father     Asthma Paternal Uncle     Cancer Maternal Grandfather        Past Surgical History:   Procedure Laterality Date    CERVICAL DISC ARTHROPLASTY  04/2015    2 aritificial discs placed    GASTRIC BYPASS  07/2015    HYSTERECTOMY  2019    Lab band reversal  2014    LAPAROSCOPIC GASTRIC BANDING  2001    OOPHORECTOMY  2019    ROBOT-ASSISTED LAPAROSCOPIC HYSTERECTOMY      SINUS SURGERY  1996    SINUS SURGERY  2007    TONSILLECTOMY, ADENOIDECTOMY  1981       Review of Systems   Constitutional:  Positive for fatigue. Negative for appetite change, chills, fever and unexpected weight change.   HENT:  Negative for congestion, mouth  sores, nosebleeds, sore throat, trouble swallowing and voice change.    Respiratory:  Negative for cough, chest tightness, shortness of breath and wheezing.    Cardiovascular:  Negative for chest pain and leg swelling.   Gastrointestinal:  Negative for abdominal distention, abdominal pain, blood in stool, constipation, diarrhea, nausea and vomiting.   Genitourinary:  Negative for difficulty urinating, dysuria and hematuria.   Musculoskeletal:  Negative for arthralgias, back pain and myalgias.   Skin:  Negative for pallor, rash and wound.   Neurological:  Negative for dizziness, syncope, weakness and headaches.   Hematological:  Negative for adenopathy. Does not bruise/bleed easily.   Psychiatric/Behavioral:  The patient is nervous/anxious.          Medication List with Changes/Refills   Current Medications    ALPRAZOLAM (XANAX) 0.25 MG TABLET    alprazolam 0.25 mg tablet   Take 1 by mouth daily    AMITRIPTYLINE (ELAVIL) 75 MG TABLET    Take 1 tablet (75 mg total) by mouth every evening.    BUPROPION (WELLBUTRIN SR) 200 MG SR12    Take 200 mg by mouth once daily.    CETIRIZINE (ZYRTEC) 10 MG TABLET    Take 10 mg by mouth daily as needed.     CEVIMELINE (EVOXAC) 30 MG CAPSULE    Take 1 capsule (30 mg total) by mouth 3 (three) times daily.    DICYCLOMINE (BENTYL) 20 MG TABLET    Take 20 mg by mouth as needed.     EPINEPHRINE (EPIPEN) 0.3 MG/0.3 ML ATIN    inject 0.3 mg by intramuscular route once as needed for anaphylaxis    ESZOPICLONE (LUNESTA) 3 MG TAB    eszopiclone 3 mg tablet   Take 1 tablet by mouth at bedtime    FLOVENT  MCG/ACTUATION INHALER    INHALE TWO PUFFS BY MOUTH TWICE DAILY FOR ASTHMA MAINTENANCE    FOLIC ACID (FOLVITE) 1 MG TABLET    Take 1 tablet (1 mg total) by mouth once daily.    LINACLOTIDE (LINZESS) 145 MCG CAP CAPSULE    Linzess 145 mcg capsule   TAKE ONE CAPSULE BY MOUTH 30 MINUTES PRIOR TO A MEAL    LISINOPRIL 10 MG TABLET        METFORMIN (GLUCOPHAGE) 1000 MG TABLET    metformin 1,000  mg tablet   TAKE ONE TABLET BY MOUTH TWICE DAILY]    METOPROLOL SUCCINATE (TOPROL-XL) 25 MG 24 HR TABLET    Take 25 mg by mouth.    METOPROLOL SUCCINATE (TOPROL-XL) 25 MG 24 HR TABLET    Take 1 tablet by mouth every evening.    MONTELUKAST (SINGULAIR) 10 MG TABLET    Take 10 mg by mouth once daily.     NALTREXONE CAPSULE    Take 6 mg once at bedtime daily.    ONDANSETRON (ZOFRAN) 8 MG TABLET    Take 8 mg by mouth every 8 (eight) hours as needed.    ONDANSETRON (ZOFRAN-ODT) 4 MG TBDL    Take 4 mg by mouth every 6 (six) hours as needed.    PANTOPRAZOLE (PROTONIX) 40 MG TABLET    Take 40 mg by mouth 3 (three) times daily.    PREGABALIN (LYRICA) 75 MG CAPSULE    TAKE ONE CAPSULE BY MOUTH TWICE DAILY    ROSUVASTATIN (CRESTOR) 10 MG TABLET    Take 10 mg by mouth once daily.    SEMAGLUTIDE (OZEMPIC) 0.25 MG OR 0.5 MG (2 MG/3 ML) PEN INJECTOR    Inject 0.5 mg into the skin every 7 days.    SITAGLIPTIN (JANUVIA) 100 MG TAB    Januvia 100 mg tablet   TAKE ONE TABLET BY MOUTH DAILY    TIZANIDINE (ZANAFLEX) 4 MG TABLET    TAKE ONE TABLET BY MOUTH EVERY 8 HOURS AS NEEDED FOR PAIN    TOPIRAMATE (TOPAMAX) 100 MG TABLET    Take 1 tablet (100 mg total) by mouth 2 (two) times daily.    UBRELVY 100 MG TABLET    Take 1 tablet (100 mg total) by mouth every 72 hours as needed for Migraine (2 to 3 times max per week as needed).    VIIBRYD 40 MG TAB TABLET    Take 40 mg by mouth once daily.     Objective:     Vitals:    03/28/24 1426   BP: 91/66   Pulse: 86   Temp: 97 °F (36.1 °C)     Lab Results   Component Value Date    WBC 7.37 03/25/2024    HGB 14.6 03/25/2024    HCT 43.2 03/25/2024    MCV 95 03/25/2024     03/25/2024       BMP  Lab Results   Component Value Date     03/25/2024    K 3.8 03/25/2024     (H) 03/25/2024    CO2 18 (L) 03/25/2024    BUN 11 03/25/2024    CREATININE 0.8 03/25/2024    CALCIUM 9.5 03/25/2024    ANIONGAP 10 03/25/2024    EGFRNORACEVR >60 03/25/2024     Lab Results   Component Value Date    ALT  118 (H) 03/25/2024    AST 78 (H) 03/25/2024    ALKPHOS 77 03/25/2024    BILITOT 0.2 03/25/2024     Lab Results   Component Value Date    IRON 122 03/25/2024    TRANSFERRIN 248 03/25/2024    TIBC 367 03/25/2024    FESATURATED 33 03/25/2024      Lab Results   Component Value Date    HHREGTIA08 375 03/25/2024     Lab Results   Component Value Date    FOLATE 36.2 (H) 03/25/2024         Physical Exam  Vitals reviewed.   Constitutional:       Appearance: She is well-developed.   HENT:      Head: Normocephalic.      Right Ear: External ear normal.      Left Ear: External ear normal.      Nose: Nose normal.   Eyes:      General: Lids are normal. No scleral icterus.        Right eye: No discharge.         Left eye: No discharge.      Conjunctiva/sclera: Conjunctivae normal.   Neck:      Thyroid: No thyroid mass.   Cardiovascular:      Rate and Rhythm: Normal rate and regular rhythm.      Heart sounds: Normal heart sounds.   Pulmonary:      Effort: Pulmonary effort is normal. No respiratory distress.      Breath sounds: Normal breath sounds.   Abdominal:      General: There is no distension.   Genitourinary:     Comments: deferred  Musculoskeletal:         General: Normal range of motion.      Cervical back: Normal range of motion.   Skin:     General: Skin is warm and dry.   Neurological:      Mental Status: She is alert and oriented to person, place, and time.   Psychiatric:         Speech: Speech normal.         Behavior: Behavior normal. Behavior is cooperative.         Thought Content: Thought content normal.        Assessment:     Problem List Items Addressed This Visit          Oncology    Other iron deficiency anemias     Resolution of iron deficiency s/p weekly Feraheme x 2    F/u 3 months with repeat labs. Sooner f/u PRN         Other vitamin B12 deficiency anemias     Continue monthly B12 injections            GI    Elevated liver enzymes - Primary     Hepatitis labs today. Arrange abdominal US. Consider Hepatology  referral PRN. Avoid ETOH. Avoid tylenol          Relevant Orders    HEPATITIS PANEL    Hepatitis C Antibody    Hepatitis B Core Antibody, Total    Hepatitis B Surface Antigen    US Abdomen Complete         Plan:     Elevated liver enzymes  -     HEPATITIS PANEL; Future; Expected date: 03/28/2024  -     Hepatitis C Antibody; Future; Expected date: 03/28/2024  -     Hepatitis B Core Antibody, Total; Future; Expected date: 03/28/2024  -     Hepatitis B Surface Antigen; Future; Expected date: 03/28/2024  -     US Abdomen Complete; Future; Expected date: 03/28/2024    Other iron deficiency anemias    Other vitamin B12 deficiency anemias            Med Onc Chart Routing      Follow up with physician    Follow up with [facility] 3 months.   Infusion scheduling note    Injection scheduling note    Labs CBC, ferritin, iron and TIBC and CMP   Scheduling:  Preferred lab:  Lab interval:  1-2 days prior   Imaging None      Pharmacy appointment No pharmacy appointment needed      Other referrals       No additional referrals needed         Ailyn Bernal, RENEP-C

## 2024-03-28 NOTE — DISCHARGE INSTRUCTIONS
.[facility]  [address] [address]  [address]  [phone] phone     [phone] fax  Hours of Operation: Monday- Friday 8:00am- 5:00pm  After hours phone  [phone]  Hematology / Oncology Physicians on call    Dr. Manny Medina        Nurse Practitioners:    Kim Royal, TRISHA Bernal, TRISHA Morgan, TRISHA Veliz, TRISHA Farmer, PA      Please don't hesitate to call if you have any concerns.

## 2024-03-28 NOTE — ASSESSMENT & PLAN NOTE
Hepatitis labs today. Arrange abdominal US. Consider Hepatology referral PRN. Avoid ETOH. Avoid tylenol

## 2024-03-29 LAB
HAV IGM SERPL QL IA: NORMAL
HBV CORE AB SERPL QL IA: NORMAL
HBV CORE IGM SERPL QL IA: NORMAL
HBV SURFACE AG SERPL QL IA: NORMAL
HBV SURFACE AG SERPL QL IA: NORMAL
HCV AB SERPL QL IA: NORMAL
HCV AB SERPL QL IA: NORMAL

## 2024-04-25 ENCOUNTER — INFUSION (OUTPATIENT)
Dept: INFUSION THERAPY | Facility: HOSPITAL | Age: 44
End: 2024-04-25
Attending: NURSE PRACTITIONER
Payer: COMMERCIAL

## 2024-04-25 ENCOUNTER — HOSPITAL ENCOUNTER (OUTPATIENT)
Dept: RADIOLOGY | Facility: HOSPITAL | Age: 44
Discharge: HOME | End: 2024-04-25
Attending: NURSE PRACTITIONER
Payer: COMMERCIAL

## 2024-04-25 VITALS
OXYGEN SATURATION: 98 % | DIASTOLIC BLOOD PRESSURE: 65 MMHG | HEART RATE: 92 BPM | SYSTOLIC BLOOD PRESSURE: 90 MMHG | TEMPERATURE: 98 F | RESPIRATION RATE: 16 BRPM

## 2024-04-25 DIAGNOSIS — R74.8 ELEVATED LIVER ENZYMES: Primary | ICD-10-CM

## 2024-04-25 DIAGNOSIS — R74.8 ELEVATED LIVER ENZYMES: ICD-10-CM

## 2024-04-25 DIAGNOSIS — D51.8 OTHER VITAMIN B12 DEFICIENCY ANEMIAS: ICD-10-CM

## 2024-04-25 DIAGNOSIS — Z98.84 S/P BARIATRIC SURGERY: ICD-10-CM

## 2024-04-25 DIAGNOSIS — D50.8 OTHER IRON DEFICIENCY ANEMIAS: Primary | ICD-10-CM

## 2024-04-25 PROCEDURE — 76700 US EXAM ABDOM COMPLETE: CPT | Mod: 26,,, | Performed by: RADIOLOGY

## 2024-04-25 PROCEDURE — 63600175 PHARM REV CODE 636 W HCPCS: Performed by: NURSE PRACTITIONER

## 2024-04-25 PROCEDURE — 96372 THER/PROPH/DIAG INJ SC/IM: CPT

## 2024-04-25 PROCEDURE — 76700 US EXAM ABDOM COMPLETE: CPT | Mod: TC

## 2024-04-25 RX ORDER — CYANOCOBALAMIN 1000 UG/ML
1000 INJECTION, SOLUTION INTRAMUSCULAR; SUBCUTANEOUS
Status: COMPLETED | OUTPATIENT
Start: 2024-04-25 | End: 2024-04-25

## 2024-04-25 RX ORDER — CYANOCOBALAMIN 1000 UG/ML
1000 INJECTION, SOLUTION INTRAMUSCULAR; SUBCUTANEOUS
Status: CANCELLED | OUTPATIENT
Start: 2024-04-25

## 2024-04-25 RX ADMIN — CYANOCOBALAMIN 1000 MCG: 1000 INJECTION INTRAMUSCULAR; SUBCUTANEOUS at 03:04

## 2024-04-26 ENCOUNTER — PATIENT MESSAGE (OUTPATIENT)
Dept: HEMATOLOGY/ONCOLOGY | Facility: CLINIC | Age: 44
End: 2024-04-26
Payer: COMMERCIAL

## 2024-05-08 DIAGNOSIS — G43.009 MIGRAINE WITHOUT AURA AND WITHOUT STATUS MIGRAINOSUS, NOT INTRACTABLE: ICD-10-CM

## 2024-05-08 RX ORDER — UBROGEPANT 100 MG/1
100 TABLET ORAL
Qty: 10 TABLET | Refills: 2 | Status: SHIPPED | OUTPATIENT
Start: 2024-05-08

## 2024-05-16 ENCOUNTER — PATIENT MESSAGE (OUTPATIENT)
Dept: HEMATOLOGY/ONCOLOGY | Facility: CLINIC | Age: 44
End: 2024-05-16
Payer: COMMERCIAL

## 2024-05-24 ENCOUNTER — INFUSION (OUTPATIENT)
Dept: INFUSION THERAPY | Facility: HOSPITAL | Age: 44
End: 2024-05-24
Attending: NURSE PRACTITIONER
Payer: COMMERCIAL

## 2024-05-24 DIAGNOSIS — D50.8 OTHER IRON DEFICIENCY ANEMIAS: Primary | ICD-10-CM

## 2024-05-24 DIAGNOSIS — Z98.84 S/P BARIATRIC SURGERY: ICD-10-CM

## 2024-05-24 DIAGNOSIS — D51.8 OTHER VITAMIN B12 DEFICIENCY ANEMIAS: ICD-10-CM

## 2024-05-24 PROCEDURE — 63600175 PHARM REV CODE 636 W HCPCS: Performed by: NURSE PRACTITIONER

## 2024-05-24 PROCEDURE — 96372 THER/PROPH/DIAG INJ SC/IM: CPT

## 2024-05-24 RX ORDER — CYANOCOBALAMIN 1000 UG/ML
1000 INJECTION, SOLUTION INTRAMUSCULAR; SUBCUTANEOUS
OUTPATIENT
Start: 2024-05-24

## 2024-05-24 RX ORDER — CYANOCOBALAMIN 1000 UG/ML
1000 INJECTION, SOLUTION INTRAMUSCULAR; SUBCUTANEOUS
Status: COMPLETED | OUTPATIENT
Start: 2024-05-24 | End: 2024-05-24

## 2024-05-24 RX ADMIN — CYANOCOBALAMIN 1000 MCG: 1000 INJECTION INTRAMUSCULAR; SUBCUTANEOUS at 02:05

## 2024-05-24 NOTE — DISCHARGE INSTRUCTIONS
[facility]  [address] [address]  [address]  [phone] phone     [phone] fax  Hours of Operation: Monday- Friday 8:00am- 5:00pm  After hours phone  [phone]  Hematology / Oncology Physicians on call      CHRISS Multani Dr., NP Phaon Dunbar, NP Khelsea Conley, FNP    Please call with any concerns regarding your appointment today.

## 2024-06-17 ENCOUNTER — LAB VISIT (OUTPATIENT)
Dept: LAB | Facility: HOSPITAL | Age: 44
End: 2024-06-17
Attending: NURSE PRACTITIONER
Payer: COMMERCIAL

## 2024-06-17 DIAGNOSIS — D50.8 OTHER IRON DEFICIENCY ANEMIAS: ICD-10-CM

## 2024-06-17 LAB
ALBUMIN SERPL BCP-MCNC: 4 G/DL (ref 3.5–5.2)
ALP SERPL-CCNC: 59 U/L (ref 55–135)
ALT SERPL W/O P-5'-P-CCNC: 60 U/L (ref 10–44)
ANION GAP SERPL CALC-SCNC: 8 MMOL/L (ref 8–16)
AST SERPL-CCNC: 41 U/L (ref 10–40)
BASOPHILS # BLD AUTO: 0.03 K/UL (ref 0–0.2)
BASOPHILS NFR BLD: 0.5 % (ref 0–1.9)
BILIRUB SERPL-MCNC: 0.3 MG/DL (ref 0.1–1)
BUN SERPL-MCNC: 10 MG/DL (ref 6–20)
CALCIUM SERPL-MCNC: 9.1 MG/DL (ref 8.7–10.5)
CHLORIDE SERPL-SCNC: 109 MMOL/L (ref 95–110)
CO2 SERPL-SCNC: 23 MMOL/L (ref 23–29)
CREAT SERPL-MCNC: 0.7 MG/DL (ref 0.5–1.4)
DIFFERENTIAL METHOD BLD: ABNORMAL
EOSINOPHIL # BLD AUTO: 0.1 K/UL (ref 0–0.5)
EOSINOPHIL NFR BLD: 1.5 % (ref 0–8)
ERYTHROCYTE [DISTWIDTH] IN BLOOD BY AUTOMATED COUNT: 12.3 % (ref 11.5–14.5)
EST. GFR  (NO RACE VARIABLE): >60 ML/MIN/1.73 M^2
FERRITIN SERPL-MCNC: 141 NG/ML (ref 20–300)
GLUCOSE SERPL-MCNC: 100 MG/DL (ref 70–110)
HCT VFR BLD AUTO: 42.3 % (ref 37–48.5)
HGB BLD-MCNC: 13.7 G/DL (ref 12–16)
IMM GRANULOCYTES # BLD AUTO: 0.02 K/UL (ref 0–0.04)
IMM GRANULOCYTES NFR BLD AUTO: 0.4 % (ref 0–0.5)
IRON SERPL-MCNC: 94 UG/DL (ref 30–160)
LYMPHOCYTES # BLD AUTO: 1.9 K/UL (ref 1–4.8)
LYMPHOCYTES NFR BLD: 34.9 % (ref 18–48)
MCH RBC QN AUTO: 31.6 PG (ref 27–31)
MCHC RBC AUTO-ENTMCNC: 32.4 G/DL (ref 32–36)
MCV RBC AUTO: 98 FL (ref 82–98)
MONOCYTES # BLD AUTO: 0.4 K/UL (ref 0.3–1)
MONOCYTES NFR BLD: 7.1 % (ref 4–15)
NEUTROPHILS # BLD AUTO: 3.1 K/UL (ref 1.8–7.7)
NEUTROPHILS NFR BLD: 55.6 % (ref 38–73)
NRBC BLD-RTO: 0 /100 WBC
PLATELET # BLD AUTO: 322 K/UL (ref 150–450)
PMV BLD AUTO: 9.7 FL (ref 9.2–12.9)
POTASSIUM SERPL-SCNC: 4 MMOL/L (ref 3.5–5.1)
PROT SERPL-MCNC: 6.9 G/DL (ref 6–8.4)
RBC # BLD AUTO: 4.34 M/UL (ref 4–5.4)
SATURATED IRON: 25 % (ref 20–50)
SODIUM SERPL-SCNC: 140 MMOL/L (ref 136–145)
TOTAL IRON BINDING CAPACITY: 373 UG/DL (ref 250–450)
TRANSFERRIN SERPL-MCNC: 252 MG/DL (ref 200–375)
WBC # BLD AUTO: 5.48 K/UL (ref 3.9–12.7)

## 2024-06-17 PROCEDURE — 83540 ASSAY OF IRON: CPT | Performed by: NURSE PRACTITIONER

## 2024-06-17 PROCEDURE — 36415 COLL VENOUS BLD VENIPUNCTURE: CPT | Mod: PO | Performed by: NURSE PRACTITIONER

## 2024-06-17 PROCEDURE — 85025 COMPLETE CBC W/AUTO DIFF WBC: CPT | Performed by: NURSE PRACTITIONER

## 2024-06-17 PROCEDURE — 82728 ASSAY OF FERRITIN: CPT | Performed by: NURSE PRACTITIONER

## 2024-06-17 PROCEDURE — 80053 COMPREHEN METABOLIC PANEL: CPT | Performed by: NURSE PRACTITIONER

## 2024-06-21 ENCOUNTER — TELEPHONE (OUTPATIENT)
Dept: INFUSION THERAPY | Facility: HOSPITAL | Age: 44
End: 2024-06-21
Payer: COMMERCIAL

## 2024-06-21 ENCOUNTER — PATIENT MESSAGE (OUTPATIENT)
Dept: HEMATOLOGY/ONCOLOGY | Facility: CLINIC | Age: 44
End: 2024-06-21
Payer: COMMERCIAL

## 2024-06-28 ENCOUNTER — DOCUMENTATION ONLY (OUTPATIENT)
Dept: HEMATOLOGY/ONCOLOGY | Facility: CLINIC | Age: 44
End: 2024-06-28

## 2024-06-28 ENCOUNTER — OFFICE VISIT (OUTPATIENT)
Dept: HEMATOLOGY/ONCOLOGY | Facility: CLINIC | Age: 44
End: 2024-06-28
Payer: COMMERCIAL

## 2024-06-28 ENCOUNTER — INFUSION (OUTPATIENT)
Dept: INFUSION THERAPY | Facility: HOSPITAL | Age: 44
End: 2024-06-28
Attending: NURSE PRACTITIONER
Payer: COMMERCIAL

## 2024-06-28 VITALS
TEMPERATURE: 98 F | HEIGHT: 65 IN | WEIGHT: 182.56 LBS | HEART RATE: 80 BPM | SYSTOLIC BLOOD PRESSURE: 90 MMHG | DIASTOLIC BLOOD PRESSURE: 63 MMHG | OXYGEN SATURATION: 96 % | BODY MASS INDEX: 30.42 KG/M2

## 2024-06-28 DIAGNOSIS — D51.8 OTHER VITAMIN B12 DEFICIENCY ANEMIAS: ICD-10-CM

## 2024-06-28 DIAGNOSIS — D50.8 OTHER IRON DEFICIENCY ANEMIAS: Primary | ICD-10-CM

## 2024-06-28 DIAGNOSIS — D50.8 OTHER IRON DEFICIENCY ANEMIAS: ICD-10-CM

## 2024-06-28 DIAGNOSIS — Z98.84 S/P BARIATRIC SURGERY: ICD-10-CM

## 2024-06-28 DIAGNOSIS — R74.8 ELEVATED LIVER ENZYMES: Primary | ICD-10-CM

## 2024-06-28 PROCEDURE — 63600175 PHARM REV CODE 636 W HCPCS: Performed by: NURSE PRACTITIONER

## 2024-06-28 PROCEDURE — 99999 PR PBB SHADOW E&M-EST. PATIENT-LVL III: CPT | Mod: PBBFAC,,, | Performed by: NURSE PRACTITIONER

## 2024-06-28 PROCEDURE — 96372 THER/PROPH/DIAG INJ SC/IM: CPT

## 2024-06-28 RX ORDER — CYANOCOBALAMIN 1000 UG/ML
1000 INJECTION, SOLUTION INTRAMUSCULAR; SUBCUTANEOUS
OUTPATIENT
Start: 2024-06-28

## 2024-06-28 RX ORDER — CYANOCOBALAMIN 1000 UG/ML
1000 INJECTION, SOLUTION INTRAMUSCULAR; SUBCUTANEOUS
Status: COMPLETED | OUTPATIENT
Start: 2024-06-28 | End: 2024-06-28

## 2024-06-28 RX ORDER — CYANOCOBALAMIN 1000 UG/ML
1000 INJECTION, SOLUTION INTRAMUSCULAR; SUBCUTANEOUS
Status: CANCELLED | OUTPATIENT
Start: 2024-06-28

## 2024-06-28 RX ADMIN — CYANOCOBALAMIN 1000 MCG: 1000 INJECTION INTRAMUSCULAR; SUBCUTANEOUS at 09:06

## 2024-06-28 NOTE — DISCHARGE INSTRUCTIONS
.[facility]  [address] [address]  [address]  [phone] phone     [phone] fax  Hours of Operation: Monday- Friday 8:00am- 5:00pm  After hours phone  [phone]  Hematology / Oncology Physicians on call    Dr. Manny Medina        Nurse Practitioners:    Kim Royal, TRISHA Bernal, TRISHA Morgan, TRISHA Veliz, TRISHA Farmer, PA      Please don't hesitate to call if you have any concerns.

## 2024-06-28 NOTE — PROGRESS NOTES
Subjective:       Patient ID: Shelby Meeks is a 44 y.o. female.    Chief Complaint: Review labs. Nutritional anemia    HPI: 44 y.o female with h/o gastric bypass, anemia, iron deficiency, B12 deficiency presenting today for follow up. She feels well overall some fatigue with recent move this week. Notes intermittent abdominal pain. Denies ETOH outside of occasional wine. Denies excessive tylenol use, takes occasionally for headache.   Social History     Socioeconomic History    Marital status: Single   Occupational History    Occupation: Teacher   Tobacco Use    Smoking status: Former     Current packs/day: 0.00     Average packs/day: 0.5 packs/day for 10.0 years (5.0 ttl pk-yrs)     Types: Cigarettes     Start date: 1999     Quit date: 2009     Years since quitting: 15.4    Smokeless tobacco: Never   Substance and Sexual Activity    Alcohol use: Yes     Comment: occ    Drug use: No    Sexual activity: Not Currently     Partners: Male     Birth control/protection: Abstinence, Injection       Past Medical History:   Diagnosis Date    Anemia     Asthma     Childhood    Diabetes mellitus 2003    Fibromyalgia 2013    Heart disease     Hypertension 1996    Primary osteoarthritis involving multiple joints 3/6/2019       Family History   Problem Relation Name Age of Onset    Cancer Mother      Hypertension Mother      Cancer Father      Asthma Paternal Uncle      Cancer Maternal Grandfather         Past Surgical History:   Procedure Laterality Date    CERVICAL DISC ARTHROPLASTY  04/2015    2 aritificial discs placed    GASTRIC BYPASS  07/2015    HYSTERECTOMY  2019    Lab band reversal  2014    LAPAROSCOPIC GASTRIC BANDING  2001    OOPHORECTOMY  2019    ROBOT-ASSISTED LAPAROSCOPIC HYSTERECTOMY      SINUS SURGERY  1996    SINUS SURGERY  2007    TONSILLECTOMY, ADENOIDECTOMY  1981       Review of Systems   Constitutional:  Positive for fatigue. Negative for appetite change, chills, fever and unexpected weight change.    HENT:  Negative for congestion, mouth sores, nosebleeds, sore throat, trouble swallowing and voice change.    Respiratory:  Negative for cough, chest tightness, shortness of breath and wheezing.    Cardiovascular:  Negative for chest pain and leg swelling.   Gastrointestinal:  Negative for abdominal distention, abdominal pain, blood in stool, constipation, diarrhea, nausea and vomiting.   Genitourinary:  Negative for difficulty urinating, dysuria and hematuria.   Musculoskeletal:  Negative for arthralgias, back pain and myalgias.   Skin:  Negative for pallor, rash and wound.   Neurological:  Negative for dizziness, syncope, weakness and headaches.   Hematological:  Negative for adenopathy. Does not bruise/bleed easily.   Psychiatric/Behavioral:  The patient is not nervous/anxious.          Medication List with Changes/Refills   Current Medications    ALPRAZOLAM (XANAX) 0.25 MG TABLET    alprazolam 0.25 mg tablet   Take 1 by mouth daily    AMITRIPTYLINE (ELAVIL) 75 MG TABLET    Take 1 tablet (75 mg total) by mouth every evening.    BUPROPION (WELLBUTRIN SR) 200 MG SR12    Take 200 mg by mouth once daily.    CETIRIZINE (ZYRTEC) 10 MG TABLET    Take 10 mg by mouth daily as needed.     CEVIMELINE (EVOXAC) 30 MG CAPSULE    Take 1 capsule (30 mg total) by mouth 3 (three) times daily.    DICYCLOMINE (BENTYL) 20 MG TABLET    Take 20 mg by mouth as needed.     EPINEPHRINE (EPIPEN) 0.3 MG/0.3 ML ATIN    inject 0.3 mg by intramuscular route once as needed for anaphylaxis    ESZOPICLONE (LUNESTA) 3 MG TAB    eszopiclone 3 mg tablet   Take 1 tablet by mouth at bedtime    FLOVENT  MCG/ACTUATION INHALER    INHALE TWO PUFFS BY MOUTH TWICE DAILY FOR ASTHMA MAINTENANCE    FOLIC ACID (FOLVITE) 1 MG TABLET    Take 1 tablet (1 mg total) by mouth once daily.    LINACLOTIDE (LINZESS) 145 MCG CAP CAPSULE    Linzess 145 mcg capsule   TAKE ONE CAPSULE BY MOUTH 30 MINUTES PRIOR TO A MEAL    LISINOPRIL 10 MG TABLET        METFORMIN  (GLUCOPHAGE) 1000 MG TABLET    metformin 1,000 mg tablet   TAKE ONE TABLET BY MOUTH TWICE DAILY]    METOPROLOL SUCCINATE (TOPROL-XL) 25 MG 24 HR TABLET    Take 25 mg by mouth.    METOPROLOL SUCCINATE (TOPROL-XL) 25 MG 24 HR TABLET    Take 1 tablet by mouth every evening.    MONTELUKAST (SINGULAIR) 10 MG TABLET    Take 10 mg by mouth once daily.     NALTREXONE CAPSULE    Take 6 mg once at bedtime daily.    ONDANSETRON (ZOFRAN) 8 MG TABLET    Take 8 mg by mouth every 8 (eight) hours as needed.    ONDANSETRON (ZOFRAN-ODT) 4 MG TBDL    Take 4 mg by mouth every 6 (six) hours as needed.    PANTOPRAZOLE (PROTONIX) 40 MG TABLET    Take 40 mg by mouth 3 (three) times daily.    PREGABALIN (LYRICA) 75 MG CAPSULE    TAKE ONE CAPSULE BY MOUTH TWICE DAILY    ROSUVASTATIN (CRESTOR) 10 MG TABLET    Take 10 mg by mouth once daily.    SEMAGLUTIDE (OZEMPIC) 0.25 MG OR 0.5 MG (2 MG/3 ML) PEN INJECTOR    Inject 0.5 mg into the skin every 7 days.    SITAGLIPTIN (JANUVIA) 100 MG TAB    Januvia 100 mg tablet   TAKE ONE TABLET BY MOUTH DAILY    TIZANIDINE (ZANAFLEX) 4 MG TABLET    TAKE ONE TABLET BY MOUTH EVERY 8 HOURS AS NEEDED FOR PAIN    TOPIRAMATE (TOPAMAX) 100 MG TABLET    Take 1 tablet (100 mg total) by mouth 2 (two) times daily.    UBRELVY 100 MG TABLET    Take 1 tablet (100 mg total) by mouth every 72 hours as needed for Migraine (2 to 3 times max per week as needed).    VIIBRYD 40 MG TAB TABLET    Take 40 mg by mouth once daily.     Objective:     Vitals:    06/28/24 0906   BP: 90/63   Pulse: 80   Temp: 98.2 °F (36.8 °C)     Lab Results   Component Value Date    WBC 5.48 06/17/2024    HGB 13.7 06/17/2024    HCT 42.3 06/17/2024    MCV 98 06/17/2024     06/17/2024       BMP  Lab Results   Component Value Date     06/17/2024    K 4.0 06/17/2024     06/17/2024    CO2 23 06/17/2024    BUN 10 06/17/2024    CREATININE 0.7 06/17/2024    CALCIUM 9.1 06/17/2024    ANIONGAP 8 06/17/2024    EGFRNORACEVR >60 06/17/2024      Lab Results   Component Value Date    ALT 60 (H) 06/17/2024    AST 41 (H) 06/17/2024    ALKPHOS 59 06/17/2024    BILITOT 0.3 06/17/2024     Lab Results   Component Value Date    IRON 94 06/17/2024    TRANSFERRIN 252 06/17/2024    TIBC 373 06/17/2024    FESATURATED 25 06/17/2024      Lab Results   Component Value Date    UDWBZPPI40 375 03/25/2024     Lab Results   Component Value Date    FOLATE 36.2 (H) 03/25/2024         Physical Exam  Vitals reviewed.   Constitutional:       Appearance: She is well-developed.   HENT:      Head: Normocephalic.      Right Ear: External ear normal.      Left Ear: External ear normal.      Nose: Nose normal.   Eyes:      General: Lids are normal. No scleral icterus.        Right eye: No discharge.         Left eye: No discharge.      Conjunctiva/sclera: Conjunctivae normal.   Neck:      Thyroid: No thyroid mass.   Cardiovascular:      Rate and Rhythm: Normal rate and regular rhythm.      Heart sounds: Normal heart sounds.   Pulmonary:      Effort: Pulmonary effort is normal. No respiratory distress.      Breath sounds: Normal breath sounds.   Abdominal:      General: There is no distension.   Genitourinary:     Comments: deferred  Musculoskeletal:         General: Normal range of motion.      Cervical back: Normal range of motion.   Skin:     General: Skin is warm and dry.   Neurological:      Mental Status: She is alert and oriented to person, place, and time.   Psychiatric:         Speech: Speech normal.         Behavior: Behavior normal. Behavior is cooperative.         Thought Content: Thought content normal.        Assessment:     Problem List Items Addressed This Visit          Oncology    Other iron deficiency anemias     Resolution of iron deficiency s/p previously administered weekly Feraheme x 2. She is taking OTC iron supplement daily tolerating well    She notes prior outside Colonoscopy done to evaluate GI issues normal. Declines [facility] follow up at present  time    F/u 6 months with cbc iron ferritin. Sooner f/u PRN         Relevant Orders    Ambulatory referral/consult to Hepatology    CBC Auto Differential    Comprehensive Metabolic Panel    Iron and TIBC    Ferritin    Other vitamin B12 deficiency anemias     Continue monthly B12 injections            GI    Elevated liver enzymes - Primary     Interval improvement but remains mildly elevated. Abdominal US unremarkable. Refer to Hepatology          Relevant Orders    Ambulatory referral/consult to Hepatology    CBC Auto Differential    Comprehensive Metabolic Panel    Iron and TIBC    Ferritin         Plan:     Elevated liver enzymes  -     Ambulatory referral/consult to Hepatology; Future; Expected date: 07/05/2024  -     CBC Auto Differential; Future; Expected date: 06/28/2024  -     Comprehensive Metabolic Panel; Future; Expected date: 06/28/2024  -     Iron and TIBC; Future; Expected date: 06/28/2024  -     Ferritin; Future; Expected date: 06/28/2024    Other iron deficiency anemias  -     Ambulatory referral/consult to Hepatology; Future; Expected date: 07/05/2024  -     CBC Auto Differential; Future; Expected date: 06/28/2024  -     Comprehensive Metabolic Panel; Future; Expected date: 06/28/2024  -     Iron and TIBC; Future; Expected date: 06/28/2024  -     Ferritin; Future; Expected date: 06/28/2024    Other vitamin B12 deficiency anemias    Other orders  -     Cancel: cyanocobalamin injection 1,000 mcg            Med Onc Chart Routing      Follow up with physician    Follow up with PARKER 6 months.   Infusion scheduling note    Injection scheduling note monthly B12   Labs CBC, ferritin and iron and TIBC   Scheduling:  Preferred lab:  Lab interval:  1-2 days prior   Imaging None      Pharmacy appointment No pharmacy appointment needed      Other referrals       No additional referrals needed         CRISTA Wilson

## 2024-06-28 NOTE — ASSESSMENT & PLAN NOTE
Resolution of iron deficiency s/p previously administered weekly Feraheme x 2 12/2023. She is taking OTC iron supplement daily tolerating well    She notes prior outside Colonoscopy done to evaluate GI issues normal. Declines [facility] follow up at present time    F/u 6 months with cbc iron ferritin. Sooner f/u PRN

## 2024-06-28 NOTE — PROGRESS NOTES
ANIL consulted re: cost of iron infusions. ANIL researched Needymeds.org and reached out to [employer] rep MEAGHAN Wells, and confirmed that there are no PAP's for Ferahame. ANIL notified pt of the above. Pt stated that she already paid balance with her care credit card, but wanted info for future reference. ANIL also educated pt on the clear balance program as well discussing other iron options with provider, that may have assistance programs. No other needs expressed. Pt will call ANIL if needed. SW will remain available.

## 2024-07-15 ENCOUNTER — PATIENT MESSAGE (OUTPATIENT)
Dept: HEPATOLOGY | Facility: CLINIC | Age: 44
End: 2024-07-15
Payer: COMMERCIAL

## 2024-07-26 ENCOUNTER — INFUSION (OUTPATIENT)
Dept: INFUSION THERAPY | Facility: HOSPITAL | Age: 44
End: 2024-07-26
Attending: NURSE PRACTITIONER
Payer: COMMERCIAL

## 2024-07-26 DIAGNOSIS — Z98.84 S/P BARIATRIC SURGERY: ICD-10-CM

## 2024-07-26 DIAGNOSIS — D51.8 OTHER VITAMIN B12 DEFICIENCY ANEMIAS: ICD-10-CM

## 2024-07-26 DIAGNOSIS — M35.09 SJOGREN'S SYNDROME WITH OTHER ORGAN INVOLVEMENT: ICD-10-CM

## 2024-07-26 DIAGNOSIS — D50.8 OTHER IRON DEFICIENCY ANEMIAS: Primary | ICD-10-CM

## 2024-07-26 PROCEDURE — 63600175 PHARM REV CODE 636 W HCPCS: Performed by: NURSE PRACTITIONER

## 2024-07-26 PROCEDURE — 96372 THER/PROPH/DIAG INJ SC/IM: CPT

## 2024-07-26 RX ORDER — CYANOCOBALAMIN 1000 UG/ML
1000 INJECTION, SOLUTION INTRAMUSCULAR; SUBCUTANEOUS
Status: COMPLETED | OUTPATIENT
Start: 2024-07-26 | End: 2024-07-26

## 2024-07-26 RX ORDER — CYANOCOBALAMIN 1000 UG/ML
INJECTION, SOLUTION INTRAMUSCULAR; SUBCUTANEOUS
Status: DISCONTINUED
Start: 2024-07-26 | End: 2024-07-26 | Stop reason: HOSPADM

## 2024-07-26 RX ORDER — CYANOCOBALAMIN 1000 UG/ML
1000 INJECTION, SOLUTION INTRAMUSCULAR; SUBCUTANEOUS
OUTPATIENT
Start: 2024-07-26

## 2024-07-26 RX ADMIN — CYANOCOBALAMIN 1000 MCG: 1000 INJECTION INTRAMUSCULAR; SUBCUTANEOUS at 09:07

## 2024-07-26 NOTE — DISCHARGE INSTRUCTIONS
[facility]  [address] [address]  [address] [address]  [phone] phone     [phone] fax  Hours of Operation: Monday- Friday 8:00am- 5:00pm  After hours phone  [phone]  Hematology / Oncology Physicians on call      CHRISS Multani Dr., NP Phaon Dunbar, NP Khelsea Conley, FNP    Please call with any concerns regarding your appointment today.

## 2024-07-28 RX ORDER — CEVIMELINE HYDROCHLORIDE 30 MG/1
30 CAPSULE ORAL 3 TIMES DAILY
Qty: 90 CAPSULE | Refills: 11 | Status: SHIPPED | OUTPATIENT
Start: 2024-07-28

## 2024-08-02 DIAGNOSIS — M79.7 FIBROMYALGIA: ICD-10-CM

## 2024-08-03 RX ORDER — PREGABALIN 75 MG/1
CAPSULE ORAL
Qty: 60 CAPSULE | Refills: 5 | Status: SHIPPED | OUTPATIENT
Start: 2024-08-03

## 2024-08-23 ENCOUNTER — INFUSION (OUTPATIENT)
Dept: INFUSION THERAPY | Facility: HOSPITAL | Age: 44
End: 2024-08-23
Attending: NURSE PRACTITIONER
Payer: COMMERCIAL

## 2024-08-23 DIAGNOSIS — D51.8 OTHER VITAMIN B12 DEFICIENCY ANEMIAS: ICD-10-CM

## 2024-08-23 DIAGNOSIS — D50.8 OTHER IRON DEFICIENCY ANEMIAS: Primary | ICD-10-CM

## 2024-08-23 DIAGNOSIS — Z98.84 S/P BARIATRIC SURGERY: ICD-10-CM

## 2024-08-23 PROCEDURE — 96372 THER/PROPH/DIAG INJ SC/IM: CPT

## 2024-08-23 PROCEDURE — 63600175 PHARM REV CODE 636 W HCPCS: Performed by: NURSE PRACTITIONER

## 2024-08-23 RX ORDER — CYANOCOBALAMIN 1000 UG/ML
1000 INJECTION, SOLUTION INTRAMUSCULAR; SUBCUTANEOUS
Status: COMPLETED | OUTPATIENT
Start: 2024-08-23 | End: 2024-08-23

## 2024-08-23 RX ORDER — CYANOCOBALAMIN 1000 UG/ML
1000 INJECTION, SOLUTION INTRAMUSCULAR; SUBCUTANEOUS
OUTPATIENT
Start: 2024-08-23

## 2024-08-23 RX ADMIN — CYANOCOBALAMIN 1000 MCG: 1000 INJECTION, SOLUTION INTRAMUSCULAR at 09:08

## 2024-09-20 ENCOUNTER — INFUSION (OUTPATIENT)
Dept: INFUSION THERAPY | Facility: HOSPITAL | Age: 44
End: 2024-09-20
Attending: NURSE PRACTITIONER
Payer: COMMERCIAL

## 2024-09-20 DIAGNOSIS — D51.8 OTHER VITAMIN B12 DEFICIENCY ANEMIAS: ICD-10-CM

## 2024-09-20 DIAGNOSIS — Z98.84 S/P BARIATRIC SURGERY: ICD-10-CM

## 2024-09-20 DIAGNOSIS — D50.8 OTHER IRON DEFICIENCY ANEMIAS: Primary | ICD-10-CM

## 2024-09-20 PROCEDURE — 63600175 PHARM REV CODE 636 W HCPCS: Performed by: NURSE PRACTITIONER

## 2024-09-20 RX ORDER — CYANOCOBALAMIN 1000 UG/ML
1000 INJECTION, SOLUTION INTRAMUSCULAR; SUBCUTANEOUS
Status: COMPLETED | OUTPATIENT
Start: 2024-09-20 | End: 2024-09-20

## 2024-09-20 RX ORDER — CYANOCOBALAMIN 1000 UG/ML
1000 INJECTION, SOLUTION INTRAMUSCULAR; SUBCUTANEOUS
OUTPATIENT
Start: 2024-09-20

## 2024-09-20 RX ADMIN — CYANOCOBALAMIN 1000 MCG: 1000 INJECTION, SOLUTION INTRAMUSCULAR at 03:09

## 2024-09-20 NOTE — NURSING
B 12 Injection given without difficulties.Bandaid applied. Patient instructed to stay in the clinic for 15 minutes. Patient verbalized understanding and will notify nurse with any complaints.

## 2024-09-20 NOTE — DISCHARGE INSTRUCTIONS
[facility]  [address] [facility]  [address]  [phone] phone     [phone] fax  Hours of Operation: Monday- Friday 8:00am- 5:00pm  After hours phone  [phone]  Hematology / Oncology Physicians on call      CHRISS Multani Dr., NP Phaon Dunbar, NP Khelsea Conley, FNP    Please call with any concerns regarding your appointment today.

## 2024-10-11 ENCOUNTER — PATIENT MESSAGE (OUTPATIENT)
Dept: HEMATOLOGY/ONCOLOGY | Facility: CLINIC | Age: 44
End: 2024-10-11
Payer: COMMERCIAL

## 2024-10-25 ENCOUNTER — INFUSION (OUTPATIENT)
Dept: INFUSION THERAPY | Facility: HOSPITAL | Age: 44
End: 2024-10-25
Attending: NURSE PRACTITIONER
Payer: COMMERCIAL

## 2024-10-25 DIAGNOSIS — D50.8 OTHER IRON DEFICIENCY ANEMIAS: Primary | ICD-10-CM

## 2024-10-25 DIAGNOSIS — D51.8 OTHER VITAMIN B12 DEFICIENCY ANEMIAS: ICD-10-CM

## 2024-10-25 DIAGNOSIS — Z98.84 S/P BARIATRIC SURGERY: ICD-10-CM

## 2024-10-25 PROCEDURE — 96372 THER/PROPH/DIAG INJ SC/IM: CPT

## 2024-10-25 PROCEDURE — 63600175 PHARM REV CODE 636 W HCPCS: Performed by: NURSE PRACTITIONER

## 2024-10-25 RX ORDER — CYANOCOBALAMIN 1000 UG/ML
1000 INJECTION, SOLUTION INTRAMUSCULAR; SUBCUTANEOUS
OUTPATIENT
Start: 2024-10-25

## 2024-10-25 RX ORDER — CYANOCOBALAMIN 1000 UG/ML
1000 INJECTION, SOLUTION INTRAMUSCULAR; SUBCUTANEOUS
Status: COMPLETED | OUTPATIENT
Start: 2024-10-25 | End: 2024-10-25

## 2024-10-25 RX ADMIN — CYANOCOBALAMIN 1000 MCG: 1000 INJECTION, SOLUTION INTRAMUSCULAR at 04:10

## 2024-11-15 ENCOUNTER — PATIENT MESSAGE (OUTPATIENT)
Dept: HEMATOLOGY/ONCOLOGY | Facility: CLINIC | Age: 44
End: 2024-11-15
Payer: COMMERCIAL

## 2024-11-15 ENCOUNTER — TELEPHONE (OUTPATIENT)
Dept: HEMATOLOGY/ONCOLOGY | Facility: CLINIC | Age: 44
End: 2024-11-15
Payer: COMMERCIAL

## 2024-11-15 DIAGNOSIS — D51.8 OTHER VITAMIN B12 DEFICIENCY ANEMIAS: Primary | ICD-10-CM

## 2024-11-15 RX ORDER — MAGNESIUM 200 MG
1000 TABLET ORAL DAILY
Qty: 90 TABLET | Refills: 3 | Status: SHIPPED | OUTPATIENT
Start: 2024-11-15

## 2024-11-15 NOTE — TELEPHONE ENCOUNTER
----- Message from Ailyn Bernal NP sent at 11/15/2024  4:33 PM CST -----  Hey! We decided to try sublingual B12 instead. Will you please add B12 lab to her next follow up appointment. Thank you : )  ----- Message -----  From: Diana Shaw MA  Sent: 11/15/2024  12:28 PM CST  To: Ailyn Bernal NP    Hey! Pt asked if she can have a prescription to do her b12 shots at home?

## 2024-11-22 DIAGNOSIS — Z98.84 H/O BARIATRIC SURGERY: ICD-10-CM

## 2024-11-22 RX ORDER — FOLIC ACID 1 MG/1
1000 TABLET ORAL
Qty: 100 TABLET | Refills: 3 | Status: SHIPPED | OUTPATIENT
Start: 2024-11-22

## 2024-11-24 NOTE — PROGRESS NOTES
Hepatology Note    PATIENT: Shelby Meeks  MRN: [id number]  DATE: 11/27/2024    Provider: Hepatologist - Dr Gonzalez  Urgency of review: non-urgent  Referring provider: Ailyn Bernal    Diagnosis:   1. Elevated liver enzymes    2. Other iron deficiency anemias    3. Metabolic dysfunction-associated steatotic liver disease (MASLD)    4. Sjogren's syndrome, with unspecified organ involvement    5. Obesity, unspecified class, unspecified obesity type, unspecified whether serious comorbidity present        Chief complaint:   Chief Complaint   Patient presents with    Elevated Hepatic Enzymes       Subjective:    Initial History: Shelby Meeks is a 44 y.o. female h/o gastric bypass, anemia, iron deficiency, B12 deficiency who was referred to Hepatology Clinic for consultation of elevated liver enzymes      11/27/2024   Diagnosis of fatty liver 2018 and lost 100 lbs after gastric bypass in 2014. Liver enzymes elevated in 2018. Liver enzymes trending up/down over last 7 years  Patient does not have any new complains from liver standpoint. Liver enzymes are improving  Denies ETOH outside of occasional wine. Denies excessive tylenol use. Imaging US was normal    As regards to liver disease,  - The patient reports no symptoms of hepatitis including malaise or flu-like symptoms to suggest a flare.  - The patient reports no new manifestations of portal hypertension including ascites, edema, GI bleeding, or hepatic encephalopathy to suggest liver decompensation.  - The patient reports no fevers/chills or pruritis to suggest biliary disease.    Risk factors MASLD DM, obesity, HLD      Prior Relevant History:    She  denies hepatotoxic medication    Review of systems:  A review of 12+ systems was conducted with pertinent positive and negative findings documented in HPI with all other systems reviewed and negative.      PFSH:  Past medical, family, and social history reviewed as documented in chart with pertinent  positive medical, family, and social history detailed in HPI.    Past Medical History:   Past Medical History:   Diagnosis Date    Anemia     Asthma     Childhood    Diabetes mellitus 2003    Fibromyalgia 2013    Heart disease     Hypertension 1996    Primary osteoarthritis involving multiple joints 3/6/2019       Past Surgical HIstory:   Past Surgical History:   Procedure Laterality Date    CERVICAL DISC ARTHROPLASTY  04/2015    2 aritificial discs placed    GASTRIC BYPASS  07/2015    HYSTERECTOMY  2019    Lab band reversal  2014    LAPAROSCOPIC GASTRIC BANDING  2001    OOPHORECTOMY  2019    ROBOT-ASSISTED LAPAROSCOPIC HYSTERECTOMY      SINUS SURGERY  1996    SINUS SURGERY  2007    TONSILLECTOMY, ADENOIDECTOMY  1981       Family History:   Family History   Problem Relation Name Age of Onset    Cancer Mother      Hypertension Mother      Cancer Father      Asthma Paternal Uncle      Cancer Maternal Grandfather       She has known family history of liver disease.     Social History:  reports that she has been smoking cigarettes. She started smoking about 25 years ago. She has a 7 pack-year smoking history. She has been exposed to tobacco smoke. She has never used smokeless tobacco. She reports current alcohol use. She reports that she does not use drugs.    She has no significant history of Alcohol     She denies history of IV drug use/Tatto  She  denies high-risk sexual contacts, no raw seafood, no sick contacts      Allergies:  Review of patient's allergies indicates:   Allergen Reactions    Amoxicillin-pot clavulanate Other (See Comments)     Causes severe yeast infections    Codeine     Codeine phosphate     Nsaids (non-steroidal anti-inflammatory drug) Nausea Only and Other (See Comments)       Medications:  Current Outpatient Medications   Medication Sig Dispense Refill    ALPRAZolam (XANAX) 0.25 MG tablet alprazolam 0.25 mg tablet   Take 1 by mouth daily (Patient taking differently: Take 0.25 mg by mouth 2  (two) times daily as needed for Anxiety.)      amitriptyline (ELAVIL) 75 MG tablet Take 1 tablet (75 mg total) by mouth every evening. 30 tablet 11    buPROPion (WELLBUTRIN SR) 200 MG SR12 Take 200 mg by mouth once daily.      cetirizine (ZYRTEC) 10 MG tablet Take 10 mg by mouth daily as needed.       cevimeline (EVOXAC) 30 mg capsule TAKE ONE CAPSULE BY MOUTH THREE TIMES DAILY 90 capsule 11    cyanocobalamin, vitamin B-12, 1,000 mcg Subl Place 1,000 mcg under the tongue once daily. 90 tablet 3    dicyclomine (BENTYL) 20 mg tablet Take 20 mg by mouth as needed.       eszopiclone (LUNESTA) 3 mg Tab eszopiclone 3 mg tablet   Take 1 tablet by mouth at bedtime      FLOVENT  mcg/actuation inhaler INHALE TWO PUFFS BY MOUTH TWICE DAILY FOR ASTHMA MAINTENANCE      folic acid (FOLVITE) 1 MG tablet TAKE ONE TABLET BY MOUTH ONCE DAILY 100 tablet 3    ketoconazole (NIZORAL) 2 % cream Apply 1 mg topically as needed.      linaCLOtide (LINZESS) 145 mcg Cap capsule Linzess 145 mcg capsule   TAKE ONE CAPSULE BY MOUTH 30 MINUTES PRIOR TO A MEAL      lisinopriL 10 MG tablet       metFORMIN (GLUCOPHAGE) 1000 MG tablet metformin 1,000 mg tablet   TAKE ONE TABLET BY MOUTH TWICE DAILY]      metoprolol succinate (TOPROL-XL) 25 MG 24 hr tablet Take 25 mg by mouth.      montelukast (SINGULAIR) 10 mg tablet Take 10 mg by mouth once daily.       ondansetron (ZOFRAN) 8 MG tablet Take 8 mg by mouth every 8 (eight) hours as needed.      pantoprazole (PROTONIX) 40 MG tablet Take 40 mg by mouth 3 (three) times daily.      pregabalin (LYRICA) 75 MG capsule TAKE ONE CAPSULE BY MOUTH TWICE DAILY 60 capsule 5    rosuvastatin (CRESTOR) 10 MG tablet Take 10 mg by mouth once daily.  5    semaglutide (OZEMPIC) 0.25 mg or 0.5 mg (2 mg/3 mL) pen injector Inject 0.5 mg into the skin every 7 days.      tiZANidine (ZANAFLEX) 4 MG tablet TAKE ONE TABLET BY MOUTH EVERY 8 HOURS AS NEEDED FOR PAIN 90 tablet 11    topiramate (TOPAMAX) 100 MG tablet Take 1  "tablet (100 mg total) by mouth 2 (two) times daily. 60 tablet 11    UBRELVY 100 mg tablet Take 1 tablet (100 mg total) by mouth every 72 hours as needed for Migraine (2 to 3 times max per week as needed). 10 tablet 2    VIIBRYD 40 mg Tab tablet Take 40 mg by mouth once daily.      EPINEPHrine (EPIPEN) 0.3 mg/0.3 mL AtIn inject 0.3 mg by intramuscular route once as needed for anaphylaxis (Patient not taking: Reported on 11/27/2024)      metoprolol succinate (TOPROL-XL) 25 MG 24 hr tablet Take 1 tablet by mouth every evening.      naltrexone capsule Take 6 mg once at bedtime daily. (Patient not taking: Reported on 11/27/2024) 90 capsule 3    ondansetron (ZOFRAN-ODT) 4 MG TbDL Take 4 mg by mouth every 6 (six) hours as needed. (Patient not taking: Reported on 11/27/2024)      SITagliptin (JANUVIA) 100 MG Tab Januvia 100 mg tablet   TAKE ONE TABLET BY MOUTH DAILY       No current facility-administered medications for this visit.       Review of Systems   Constitutional:  Negative for fatigue, fever and unexpected weight change.   HENT:  Negative for ear pain, nosebleeds and trouble swallowing.    Eyes:  Negative for discharge and redness.   Respiratory:  Negative for cough and shortness of breath.    Cardiovascular:  Negative for palpitations and leg swelling.   Gastrointestinal:  Negative for abdominal distention, abdominal pain, diarrhea and vomiting.   Endocrine: Negative for cold intolerance and polyuria.   Genitourinary:  Negative for flank pain and hematuria.   Musculoskeletal:  Negative for back pain.   Skin:  Negative for pallor.   Neurological:  Negative for seizures and headaches.   Hematological:  Does not bruise/bleed easily.   Psychiatric/Behavioral:  Negative for confusion and hallucinations.          Objective:      Vitals:   Vitals:    11/27/24 0818   BP: 92/62   BP Location: Left arm   Patient Position: Sitting   Pulse: 79   SpO2: 98%   Weight: 85 kg (187 lb 6.3 oz)   Height: 5' 5" (1.651 m) "       Physical Exam  Constitutional:       Appearance: Normal appearance.   HENT:      Head: Normocephalic and atraumatic.      Right Ear: Tympanic membrane and external ear normal.      Left Ear: Tympanic membrane and external ear normal.      Mouth/Throat:      Mouth: Mucous membranes are moist.   Eyes:      Extraocular Movements: Extraocular movements intact.      Pupils: Pupils are equal, round, and reactive to light.   Cardiovascular:      Rate and Rhythm: Normal rate and regular rhythm.      Pulses: Normal pulses.      Heart sounds: Normal heart sounds.   Pulmonary:      Effort: Pulmonary effort is normal.      Breath sounds: Normal breath sounds.   Abdominal:      General: Bowel sounds are normal. There is no distension.      Palpations: Abdomen is soft. There is no mass.      Tenderness: There is no abdominal tenderness.   Musculoskeletal:         General: No swelling or deformity. Normal range of motion.      Cervical back: Normal range of motion and neck supple.   Skin:     Coloration: Skin is not jaundiced.   Neurological:      General: No focal deficit present.      Mental Status: She is alert and oriented to person, place, and time.      Cranial Nerves: No cranial nerve deficit.   Psychiatric:         Mood and Affect: Mood normal.         Behavior: Behavior normal.         Laboratory Data:  No visits with results within 1 Week(s) from this visit.   Latest known visit with results is:   Lab Visit on 06/17/2024   Component Date Value Ref Range Status    WBC 06/17/2024 5.48  3.90 - 12.70 K/uL Final    RBC 06/17/2024 4.34  4.00 - 5.40 M/uL Final    Hemoglobin 06/17/2024 13.7  12.0 - 16.0 g/dL Final    Hematocrit 06/17/2024 42.3  37.0 - 48.5 % Final    MCV 06/17/2024 98  82 - 98 fL Final    MCH 06/17/2024 31.6 (H)  27.0 - 31.0 pg Final    MCHC 06/17/2024 32.4  32.0 - 36.0 g/dL Final    RDW 06/17/2024 12.3  11.5 - 14.5 % Final    Platelets 06/17/2024 322  150 - 450 K/uL Final    MPV 06/17/2024 9.7  9.2 - 12.9  fL Final    Immature Granulocytes 06/17/2024 0.4  0.0 - 0.5 % Final    Gran # (ANC) 06/17/2024 3.1  1.8 - 7.7 K/uL Final    Immature Grans (Abs) 06/17/2024 0.02  0.00 - 0.04 K/uL Final    Comment: Mild elevation in immature granulocytes is non specific and   can be seen in a variety of conditions including stress response,   acute inflammation, trauma and pregnancy. Correlation with other   laboratory and clinical findings is essential.      Lymph # 06/17/2024 1.9  1.0 - 4.8 K/uL Final    Mono # 06/17/2024 0.4  0.3 - 1.0 K/uL Final    Eos # 06/17/2024 0.1  0.0 - 0.5 K/uL Final    Baso # 06/17/2024 0.03  0.00 - 0.20 K/uL Final    nRBC 06/17/2024 0  0 /100 WBC Final    Gran % 06/17/2024 55.6  38.0 - 73.0 % Final    Lymph % 06/17/2024 34.9  18.0 - 48.0 % Final    Mono % 06/17/2024 7.1  4.0 - 15.0 % Final    Eosinophil % 06/17/2024 1.5  0.0 - 8.0 % Final    Basophil % 06/17/2024 0.5  0.0 - 1.9 % Final    Differential Method 06/17/2024 Automated   Final    Sodium 06/17/2024 140  136 - 145 mmol/L Final    Potassium 06/17/2024 4.0  3.5 - 5.1 mmol/L Final    Chloride 06/17/2024 109  95 - 110 mmol/L Final    CO2 06/17/2024 23  23 - 29 mmol/L Final    Glucose 06/17/2024 100  70 - 110 mg/dL Final    BUN 06/17/2024 10  6 - 20 mg/dL Final    Creatinine 06/17/2024 0.7  0.5 - 1.4 mg/dL Final    Calcium 06/17/2024 9.1  8.7 - 10.5 mg/dL Final    Total Protein 06/17/2024 6.9  6.0 - 8.4 g/dL Final    Albumin 06/17/2024 4.0  3.5 - 5.2 g/dL Final    Total Bilirubin 06/17/2024 0.3  0.1 - 1.0 mg/dL Final    Comment: For infants and newborns, interpretation of results should be based  on gestational age, weight and in agreement with clinical  observations.    Premature Infant recommended reference ranges:  Up to 24 hours.............<8.0 mg/dL  Up to 48 hours............<12.0 mg/dL  3-5 days..................<15.0 mg/dL  6-29 days.................<15.0 mg/dL      Alkaline Phosphatase 06/17/2024 59  55 - 135 U/L Final    AST 06/17/2024 41  "(H)  10 - 40 U/L Final    ALT 06/17/2024 60 (H)  10 - 44 U/L Final    eGFR 06/17/2024 >60  >60 mL/min/1.73 m^2 Final    Anion Gap 06/17/2024 8  8 - 16 mmol/L Final    Iron 06/17/2024 94  30 - 160 ug/dL Final    Transferrin 06/17/2024 252  200 - 375 mg/dL Final    TIBC 06/17/2024 373  250 - 450 ug/dL Final    Saturated Iron 06/17/2024 25  20 - 50 % Final    Ferritin 06/17/2024 141  20.0 - 300.0 ng/mL Final       Lab Results   Component Value Date    INR 0.9 03/04/2021       No results found for: "SMOOTHMUSCAB", "MITOAB"  Lab Results   Component Value Date    IRON 94 06/17/2024    TIBC 373 06/17/2024    FERRITIN 141 06/17/2024     Lab Results   Component Value Date    HEPAIGM Non-reactive 03/28/2024    HEPBIGM Non-reactive 03/28/2024    HEPBCAB Non-reactive 03/28/2024    HEPCAB Non-reactive 03/28/2024    HEPCAB Non-reactive 03/28/2024     Lab Results   Component Value Date    TSH 0.657 07/19/2021     No results found for: "JACKY"    No results found for: "ABORH"        No results found for: "LABA1C", "HGBA1C"  No results found for: "CHOL"  No results found for: "HDL"  No results found for: "LDLCALC"  No results found for: "TRIG"  No results found for: "CHOLHDL"      I personally reviewed imaging studies and outside records..      Assessment:       1. Elevated liver enzymes    2. Other iron deficiency anemias    3. Metabolic dysfunction-associated steatotic liver disease (MASLD)    4. Sjogren's syndrome, with unspecified organ involvement    5. Obesity, unspecified class, unspecified obesity type, unspecified whether serious comorbidity present             The patient's risk factors for MAFLD include:    Obesity/overweight                     yes Body mass index is 31.18 kg/m².  Dyslipidemia                                yes  Insulin resistance/Diabetes         yes  No results found for: "LABA1C", "HGBA1C"    Plan:     Problem List Items Addressed This Visit          Immunology/Multi System    Sjogren's disease       " Oncology    Other iron deficiency anemias       GI    Elevated liver enzymes - Primary    Relevant Orders    JACKY Screen w/Reflex    Anti-Smooth Muscle Antibody    Antimitochondrial Antibody    CBC Auto Differential    Ceruloplasmin    Hepatitis B Surface Antigen    Hepatitis C Antibody    IgG    Iron and TIBC    Protime-INR    Tissue Transglutaminase, IgA    FibroScan (Vibration Controlled Transient Elastography)    Metabolic dysfunction-associated steatotic liver disease (MASLD)     Other Visit Diagnoses       Obesity, unspecified class, unspecified obesity type, unspecified whether serious comorbidity present                Shelby was seen today for elevated hepatic enzymes.    Diagnoses and all orders for this visit:    Elevated liver enzymes  -     Ambulatory referral/consult to Hepatology  -     JACKY Screen w/Reflex; Future  -     Anti-Smooth Muscle Antibody; Future  -     Antimitochondrial Antibody; Future  -     CBC Auto Differential; Future  -     Ceruloplasmin; Future  -     Hepatitis B Surface Antigen; Future  -     Hepatitis C Antibody; Future  -     IgG; Future  -     Iron and TIBC; Future  -     Protime-INR; Future  -     Tissue Transglutaminase, IgA; Future  -     FibroScan (Vibration Controlled Transient Elastography); Future    Other iron deficiency anemias  -     Ambulatory referral/consult to Hepatology    Metabolic dysfunction-associated steatotic liver disease (MASLD)    Sjogren's syndrome, with unspecified organ involvement    Obesity, unspecified class, unspecified obesity type, unspecified whether serious comorbidity present        Elevated liver enzymes  Likely MASLD related  Rule out AIH considering sjograns  --Plan checking serologies   -Avoid Hepatotoxic medications  -Discussed liver biopsy if all the work up is negative with elevated liver enzymes          MAFLD  Fatty liver disease is a diagnosis of exclusion, will obtain additional labs to exclude autoimmune/infectitious  etiology  Educated patient on spectrum of fatty liver disease and potential for cirrhosis if ENNIS present  --Plan checking serologies to rule out other causes of chronic liver diseases   -Will obtain fibroscan for evaluation of fibrosis  -Discussed new Medication for MASLD    I recommended her a more pragmatic approach to her medical problems which would include the following:  - life-style modifications: weight loss, daily exercise (30 mins of HIIT or cardio), low carb/low fat diet         Educated patient on spectrum of fatty liver disease and potential for cirrhosis if MASH present        Explored dietary habits and discussed dietary recommendations- Low calorie, low carbohydrate, high protein diet mediterranean with goal of loosing >3-5% to improve steatosis and >7-10% to improve histological changes if present  - control of diabetes or insulin resistance   - control of high cholesterol, if needed with statin drugs or other cholesterol-lowering agents  - coffee consumption (low caloric): 2-3 cups per day may reduce liver fat  -I will defer the management of the patient's dyslipidemia and diabetes to patient's primary care physician and/or endocrinologist   -Reduction of risk factors for CVD        DM  GLP-1 to continue      Return to clinic in 6 months.    I have sent communication to the referring physician and/or primary care provider.      Time Statement  A total time spent includes time preparing to see patient, reviewing  diagnostic studies and records, direct face-to-face visit, completing orders, medications , reconciliation, prescription management, and care coordination    We discussed in depth the nature of the patient's disease, the management plan in details. I have provided the patient with an opportunity to ask questions and have all questions answered to his satisfaction.     Discussed with patient that it is likely that she will see results before Myself or my nurse are able to view them and  report results due to the Cures Act passed 4/1/21. Results will be sent immediately to the patient who are enrolled in the patient portal. If results come through after business hours or on weekend, we will not see them until the next business day that we are in the office. If resulted during the business day, we will likely not be able to review them until after completing all patient visits in office that day.       Jordan Gonzalez MD  Transplant Hepatologist and Gastroenterologist  [facility]

## 2024-11-27 ENCOUNTER — INFUSION (OUTPATIENT)
Dept: INFUSION THERAPY | Facility: HOSPITAL | Age: 44
End: 2024-11-27
Attending: NURSE PRACTITIONER
Payer: COMMERCIAL

## 2024-11-27 ENCOUNTER — OFFICE VISIT (OUTPATIENT)
Dept: HEPATOLOGY | Facility: CLINIC | Age: 44
End: 2024-11-27
Payer: COMMERCIAL

## 2024-11-27 VITALS
SYSTOLIC BLOOD PRESSURE: 92 MMHG | OXYGEN SATURATION: 98 % | HEART RATE: 79 BPM | BODY MASS INDEX: 31.22 KG/M2 | WEIGHT: 187.38 LBS | DIASTOLIC BLOOD PRESSURE: 62 MMHG | HEIGHT: 65 IN

## 2024-11-27 DIAGNOSIS — M35.00 SJOGREN'S SYNDROME, WITH UNSPECIFIED ORGAN INVOLVEMENT: ICD-10-CM

## 2024-11-27 DIAGNOSIS — R74.8 ELEVATED LIVER ENZYMES: Primary | ICD-10-CM

## 2024-11-27 DIAGNOSIS — D51.8 OTHER VITAMIN B12 DEFICIENCY ANEMIAS: ICD-10-CM

## 2024-11-27 DIAGNOSIS — Z98.84 S/P BARIATRIC SURGERY: ICD-10-CM

## 2024-11-27 DIAGNOSIS — E66.9 OBESITY, UNSPECIFIED CLASS, UNSPECIFIED OBESITY TYPE, UNSPECIFIED WHETHER SERIOUS COMORBIDITY PRESENT: ICD-10-CM

## 2024-11-27 DIAGNOSIS — D50.8 OTHER IRON DEFICIENCY ANEMIAS: ICD-10-CM

## 2024-11-27 DIAGNOSIS — K76.0 METABOLIC DYSFUNCTION-ASSOCIATED STEATOTIC LIVER DISEASE (MASLD): ICD-10-CM

## 2024-11-27 DIAGNOSIS — D50.8 OTHER IRON DEFICIENCY ANEMIAS: Primary | ICD-10-CM

## 2024-11-27 PROCEDURE — 1159F MED LIST DOCD IN RCRD: CPT | Mod: CPTII,S$GLB,, | Performed by: INTERNAL MEDICINE

## 2024-11-27 PROCEDURE — 96372 THER/PROPH/DIAG INJ SC/IM: CPT

## 2024-11-27 PROCEDURE — 99204 OFFICE O/P NEW MOD 45 MIN: CPT | Mod: S$GLB,,, | Performed by: INTERNAL MEDICINE

## 2024-11-27 PROCEDURE — 3008F BODY MASS INDEX DOCD: CPT | Mod: CPTII,S$GLB,, | Performed by: INTERNAL MEDICINE

## 2024-11-27 PROCEDURE — 3078F DIAST BP <80 MM HG: CPT | Mod: CPTII,S$GLB,, | Performed by: INTERNAL MEDICINE

## 2024-11-27 PROCEDURE — 3074F SYST BP LT 130 MM HG: CPT | Mod: CPTII,S$GLB,, | Performed by: INTERNAL MEDICINE

## 2024-11-27 PROCEDURE — 99999 PR PBB SHADOW E&M-EST. PATIENT-LVL V: CPT | Mod: PBBFAC,,, | Performed by: INTERNAL MEDICINE

## 2024-11-27 PROCEDURE — 63600175 PHARM REV CODE 636 W HCPCS: Performed by: NURSE PRACTITIONER

## 2024-11-27 PROCEDURE — 1160F RVW MEDS BY RX/DR IN RCRD: CPT | Mod: CPTII,S$GLB,, | Performed by: INTERNAL MEDICINE

## 2024-11-27 PROCEDURE — 4010F ACE/ARB THERAPY RXD/TAKEN: CPT | Mod: CPTII,S$GLB,, | Performed by: INTERNAL MEDICINE

## 2024-11-27 RX ORDER — CYANOCOBALAMIN 1000 UG/ML
1000 INJECTION, SOLUTION INTRAMUSCULAR; SUBCUTANEOUS
OUTPATIENT
Start: 2024-11-27

## 2024-11-27 RX ORDER — KETOCONAZOLE 20 MG/G
1 CREAM TOPICAL
COMMUNITY
Start: 2024-11-21

## 2024-11-27 RX ORDER — CYANOCOBALAMIN 1000 UG/ML
1000 INJECTION, SOLUTION INTRAMUSCULAR; SUBCUTANEOUS
Status: COMPLETED | OUTPATIENT
Start: 2024-11-27 | End: 2024-11-27

## 2024-11-27 RX ADMIN — CYANOCOBALAMIN 1000 MCG: 1000 INJECTION, SOLUTION INTRAMUSCULAR at 09:11

## 2024-11-27 NOTE — NURSING
Patient said she will not be getting the injections and asked me to concel next months appt. She said provider put her on oral.

## 2024-11-27 NOTE — PATIENT INSTRUCTIONS
MAFLD    Website with information about fatty liver and inflammation related to fatty liver (ENNIS) = www.nashtruth.com  AND [email or web address]     Limit alcohol consumption  2.  Weight loss goal & 7-10%, referral for [facility] if interested. Also, if interested in a dietician visit to create a weight loss plan, contact the dietician team at [facility] at [email or web address] to schedule a visit to you can call [facility] in [place]: [phone] and the  will transfer the call to one of the dieticians to schedule an appointment. Or you can also call [phone] to schedule. They do offer video visits   3. Low carb/sugar, high fiber and protein diet.Try to limit your carb intake to LESS than 30-45 grams of carbs with a meal or LESS than 5-10 grams with any snack (total of any snack foods eaten during that time). Use MyFitness Pal bryan to add up your carbs through the day. Do NOT drink any beverages with calories or carbs (this can lead to high blood sugar and weight gain). Also, some of our patients have been very successful with weight loss using the pre made/planned meal planning services that limit calories and portion size (one example is Sensible Meals but there are many out there)  4. Exercise, 5 days per week, 30 minutes per day, as tolerated  5. Recommend good cholesterol, blood pressure, blood sugar levels .      Management of patients with MASLD includes optimization of blood glucose control in patients with diabetes and treatment of hyperlipidemia. If after a year of adhering to these measures and effectively reducing your Body Mass Index plus decreasing the insulin resistance, you continue to have symptoms, we may need to start medications to treat MASLD if there is liver fibrosis. These medications are used to treat diabetes and work well for insulin resistance. Unfortunately, these medications can have side effects.      In some people, fatty liver can  progress to steatohepatitis (inflamatory fatty liver) and possibly to cirrhosis, putting one at increased risk for liver cancer, liver failure, and death. Therefore, the lifestyle changes are very important to decrease this risk.        Once again, we extend our sincere appreciation for giving us the opportunity to serve you. If there is anything else we can do to improve your experience or assist you further, please do not hesitate to reach out to us.       Thanks for trusting us with your healthcare needs and using [facility]. If you want to ask us a question, you can do so by replying to this message or by calling [phone]

## 2024-11-27 NOTE — DISCHARGE INSTRUCTIONS
[facility]  [address] [facility]  [address]  [phone] phone     [phone] fax  Hours of Operation: Monday- Friday 8:00am- 5:00pm  After hours phone  [phone]  Hematology / Oncology Physicians on call      CHRISS Multani Dr., NP Phaon Dunbar, NP Khelsea Conley, FNP    Please call with any concerns regarding your appointment today.

## 2024-12-09 ENCOUNTER — PATIENT MESSAGE (OUTPATIENT)
Dept: HEMATOLOGY/ONCOLOGY | Facility: CLINIC | Age: 44
End: 2024-12-09
Payer: COMMERCIAL

## 2024-12-10 ENCOUNTER — PATIENT MESSAGE (OUTPATIENT)
Dept: HEPATOLOGY | Facility: CLINIC | Age: 44
End: 2024-12-10
Payer: COMMERCIAL

## 2024-12-16 ENCOUNTER — TELEPHONE (OUTPATIENT)
Dept: HEPATOLOGY | Facility: CLINIC | Age: 44
End: 2024-12-16
Payer: COMMERCIAL

## 2024-12-16 ENCOUNTER — TELEPHONE (OUTPATIENT)
Dept: HEMATOLOGY/ONCOLOGY | Facility: CLINIC | Age: 44
End: 2024-12-16
Payer: COMMERCIAL

## 2024-12-16 NOTE — TELEPHONE ENCOUNTER
----- Message from Korey sent at 12/16/2024  1:48 PM CST -----  Type:  Appointment Request         Name of Caller:pt  When is the first available appointment?No access  Would the patient rather a call back or a response via [facility]? call  Best Call Back Number:[phone]   Additional Information: Pt needs to reschedule 12/30 appt to after fibro scan and blood labs are completed.

## 2024-12-16 NOTE — TELEPHONE ENCOUNTER
----- Message from [employer] sent at 12/16/2024  1:46 PM CST -----  Type: General Call Back         Name of Caller:pt  Would the patient rather a call back or a response via [facility]? call  Best Call Back Number:[phone]  Additional Information: Pt needs to reschedule fibroscan and blood labs.

## 2024-12-16 NOTE — TELEPHONE ENCOUNTER
Spoke with patient, she stated she will need to wait to reschedule appointment after she gets her scan. Stated she will contact our department to reschedule once her scan has been scheduled.

## 2024-12-16 NOTE — TELEPHONE ENCOUNTER
Called pt and LVM informing I was scheduling fibroscan for first available and labs for same day.

## 2024-12-17 ENCOUNTER — PATIENT MESSAGE (OUTPATIENT)
Dept: HEPATOLOGY | Facility: CLINIC | Age: 44
End: 2024-12-17
Payer: COMMERCIAL

## 2025-02-17 DIAGNOSIS — M79.7 FIBROMYALGIA: ICD-10-CM

## 2025-02-17 RX ORDER — PREGABALIN 75 MG/1
75 CAPSULE ORAL 2 TIMES DAILY
Qty: 60 CAPSULE | Refills: 5 | Status: SHIPPED | OUTPATIENT
Start: 2025-02-17

## 2025-02-20 DIAGNOSIS — M79.7 FIBROMYALGIA: ICD-10-CM

## 2025-02-20 DIAGNOSIS — G93.32 CFS (CHRONIC FATIGUE SYNDROME): ICD-10-CM

## 2025-02-20 DIAGNOSIS — M35.09 SJOGREN'S SYNDROME WITH OTHER ORGAN INVOLVEMENT: ICD-10-CM

## 2025-02-20 RX ORDER — TIZANIDINE 4 MG/1
4 TABLET ORAL EVERY 8 HOURS PRN
Qty: 90 TABLET | Refills: 11 | Status: SHIPPED | OUTPATIENT
Start: 2025-02-20

## 2025-05-17 ENCOUNTER — PATIENT MESSAGE (OUTPATIENT)
Dept: HEMATOLOGY/ONCOLOGY | Facility: CLINIC | Age: 45
End: 2025-05-17
Payer: COMMERCIAL

## 2025-05-17 ENCOUNTER — PATIENT MESSAGE (OUTPATIENT)
Dept: RHEUMATOLOGY | Facility: CLINIC | Age: 45
End: 2025-05-17
Payer: COMMERCIAL

## 2025-06-10 ENCOUNTER — OFFICE VISIT (OUTPATIENT)
Dept: NEUROLOGY | Facility: CLINIC | Age: 45
End: 2025-06-10
Payer: COMMERCIAL

## 2025-06-10 DIAGNOSIS — F41.9 ANXIETY AND DEPRESSION: ICD-10-CM

## 2025-06-10 DIAGNOSIS — M35.00 SJOGREN'S SYNDROME, WITH UNSPECIFIED ORGAN INVOLVEMENT: ICD-10-CM

## 2025-06-10 DIAGNOSIS — D50.8 OTHER IRON DEFICIENCY ANEMIAS: ICD-10-CM

## 2025-06-10 DIAGNOSIS — D51.8 OTHER VITAMIN B12 DEFICIENCY ANEMIAS: ICD-10-CM

## 2025-06-10 DIAGNOSIS — F32.A ANXIETY AND DEPRESSION: ICD-10-CM

## 2025-06-10 DIAGNOSIS — Z98.84 S/P BARIATRIC SURGERY: ICD-10-CM

## 2025-06-10 DIAGNOSIS — G43.009 MIGRAINE WITHOUT AURA AND WITHOUT STATUS MIGRAINOSUS, NOT INTRACTABLE: Primary | ICD-10-CM

## 2025-06-10 DIAGNOSIS — M79.7 FIBROMYALGIA: ICD-10-CM

## 2025-06-10 DIAGNOSIS — G93.32 CFS (CHRONIC FATIGUE SYNDROME): ICD-10-CM

## 2025-06-10 PROCEDURE — 4010F ACE/ARB THERAPY RXD/TAKEN: CPT | Mod: CPTII,95,, | Performed by: NURSE PRACTITIONER

## 2025-06-10 PROCEDURE — 98005 SYNCH AUDIO-VIDEO EST LOW 20: CPT | Mod: 95,,, | Performed by: NURSE PRACTITIONER

## 2025-06-10 PROCEDURE — 1159F MED LIST DOCD IN RCRD: CPT | Mod: CPTII,95,, | Performed by: NURSE PRACTITIONER

## 2025-06-10 RX ORDER — UBROGEPANT 100 MG/1
100 TABLET ORAL
Qty: 10 TABLET | Refills: 2 | Status: SHIPPED | OUTPATIENT
Start: 2025-06-10

## 2025-06-10 RX ORDER — AMITRIPTYLINE HYDROCHLORIDE 75 MG/1
75 TABLET ORAL NIGHTLY
Qty: 30 TABLET | Refills: 11 | Status: SHIPPED | OUTPATIENT
Start: 2025-06-10 | End: 2026-06-10

## 2025-06-10 NOTE — PROGRESS NOTES
Subjective:       Patient ID: Shelby Meeks is a 45 y.o. female.    Chief Complaint: Migraine without aura and without status migrainosus, not i and Medication Refill  Medication Refills Routine Follow up.       `          BACKGROUND HISTORY       Patient is a former Patient of Dr. Goodwin. The patient is here for evaluation of headaches.       The headaches started at age 12 and recurred at the age of 19. The headaches progress from the left side with throbbing nature, and also from the neck and the back of the head. No visual aura. No associated neurological deficits.  The headaches are 6-8/10 causing significant morbidity. The headache is associated with nausea and light, sound sensitivity. The headaches are daily and could last for hours and days. Migraines seem to have gotten worse after hysterectomy and unilateral oophorectomy. No TMJ problems.  The patient denies blurry vision and ear ringing. The headache is not positional or postural but worsens with movement and valsalva. Sleep help lessen the headache. No history of head trauma. No history of seizures. No history of smoking. No caffeine overuses. No vertigo. No blacking out.  No fever, chills or rigors. No history of significant memory loss. No history of strokes.  The patient cannot think of food that aggravates the headache.  Extreme heat and extreme cold aggravate the headaches. Neck pain exacerbate the headaches (She is S/P ACDF).  Family history is remarkable for migraine (mother). Abortives tried: OTC NSAIDs, Fioricet, Triptans (Imitrex, Relpax), Nurtec.Preventatives tried: BB (Inderal and Metoprolol), SSRIs, AEDs (PGB, TPM) . Continues to have daily headaches. TPM has not helped as preventative and Nurtec is not helping as abortive. 11-1-2022 Patient present for follow up for HA management. Tolerating Amitriptyline/Elavil (TCA) 50 mg QHS no side effects, she reports that it is partially helpful. Amitriptyline 50 mg po HS has decreased  frequency and intensity. Patient reports Ubrevely 100 mg po prn is helpful, no side effects. She reports it works better than Nurtec. 09- Brain MRI NL.    Patient no longer taking TPM she was weaned off slowly. Patient reports concerns since stopping TPM she has gained 15 pounds and has increased pain related to FMS. Patient instructed to discuss treatment option with Rheumatology.         INTERVAL HISTORY 06-: Patient present for follow up for HA management. Tolerating Amitriptyline/Elavil (TCA) 75 mg QHS no side effects, she reports decreased headache frequency and intensity. Patient reports Ubrevely 100 mg po prn no side effects. Ubrevely is most helpful, no adverse side effects. Refills sent to pharmacy. Patient taking  mg po BID along with Pregablin/Lyrica  75 mg po BID managed by Rheumatology.     The originating site (patient location) is: Home.        The distant site (neurologist location) is: [facility] Clinic at [facility].        The chief complaint leading to consultation is: F/U HA, Medication refills sent      Visit type: Virtual visit with synchronous audio and video.        Total time spent with patient: 15 minutes         Special circumstances: This visit occurred during COVID-19 Pandemic Public Health Emergency.         Consent: The patient verbally consented to participating in the video visit and informed that may decline to receive medical services by telemedicine and may withdraw from such care at any time.        I discussed with the patient the nature of our telemedicine visits, that:        I  would evaluate the patient and recommend diagnostics and treatments based on my assessment.     Our sessions are not being recorded and that personal health information is protected.     Our team would provide follow up care in person if/when the patient needs it.     Virtual (video/telemedicine) visits have significant limitations. A telemedicine exam is primarily focused  on the history and what I can observe. Several critical parts of the neurological exam cannot be performed.       Review of Systems   Constitutional:  Positive for fatigue. Negative for appetite change.   HENT:  Negative for hearing loss and tinnitus.    Eyes:  Negative for photophobia and visual disturbance.   Respiratory:  Negative for apnea and shortness of breath.    Cardiovascular:  Negative for chest pain and palpitations.   Gastrointestinal:  Negative for nausea and vomiting.   Endocrine: Negative for cold intolerance and heat intolerance.   Genitourinary:  Negative for difficulty urinating and urgency.   Musculoskeletal:  Negative for arthralgias, back pain, gait problem, joint swelling, myalgias, neck pain and neck stiffness.   Skin:  Negative for color change and rash.   Allergic/Immunologic: Negative for environmental allergies and immunocompromised state.   Neurological:  Positive for headaches. Negative for dizziness, tremors, seizures, syncope, facial asymmetry, speech difficulty, weakness, light-headedness and numbness.   Hematological:  Negative for adenopathy. Does not bruise/bleed easily.   Psychiatric/Behavioral:  Negative for agitation, behavioral problems, confusion, decreased concentration, dysphoric mood, hallucinations, self-injury, sleep disturbance and suicidal ideas. The patient is not hyperactive.                  Current Outpatient Medications:     ALPRAZolam (XANAX) 0.25 MG tablet, alprazolam 0.25 mg tablet  Take 1 by mouth daily (Patient taking differently: Take 0.25 mg by mouth 2 (two) times daily as needed for Anxiety.), Disp: , Rfl:     buPROPion (WELLBUTRIN SR) 200 MG SR12, Take 200 mg by mouth once daily., Disp: , Rfl:     cetirizine (ZYRTEC) 10 MG tablet, Take 10 mg by mouth daily as needed. , Disp: , Rfl:     cevimeline (EVOXAC) 30 mg capsule, TAKE ONE CAPSULE BY MOUTH THREE TIMES DAILY, Disp: 90 capsule, Rfl: 11    cyanocobalamin, vitamin B-12, 1,000 mcg Subl, Place 1,000 mcg  under the tongue once daily., Disp: 90 tablet, Rfl: 3    dicyclomine (BENTYL) 20 mg tablet, Take 20 mg by mouth as needed. , Disp: , Rfl:     EPINEPHrine (EPIPEN) 0.3 mg/0.3 mL AtIn, , Disp: , Rfl:     eszopiclone (LUNESTA) 3 mg Tab, eszopiclone 3 mg tablet  Take 1 tablet by mouth at bedtime, Disp: , Rfl:     FLOVENT  mcg/actuation inhaler, INHALE TWO PUFFS BY MOUTH TWICE DAILY FOR ASTHMA MAINTENANCE, Disp: , Rfl:     folic acid (FOLVITE) 1 MG tablet, TAKE ONE TABLET BY MOUTH ONCE DAILY, Disp: 100 tablet, Rfl: 3    ketoconazole (NIZORAL) 2 % cream, Apply 1 mg topically as needed., Disp: , Rfl:     linaCLOtide (LINZESS) 145 mcg Cap capsule, Linzess 145 mcg capsule  TAKE ONE CAPSULE BY MOUTH 30 MINUTES PRIOR TO A MEAL, Disp: , Rfl:     lisinopriL 10 MG tablet, , Disp: , Rfl:     metFORMIN (GLUCOPHAGE) 1000 MG tablet, metformin 1,000 mg tablet  TAKE ONE TABLET BY MOUTH TWICE DAILY], Disp: , Rfl:     metoprolol succinate (TOPROL-XL) 25 MG 24 hr tablet, Take 25 mg by mouth., Disp: , Rfl:     metoprolol succinate (TOPROL-XL) 25 MG 24 hr tablet, Take 1 tablet by mouth every evening., Disp: , Rfl:     montelukast (SINGULAIR) 10 mg tablet, Take 10 mg by mouth once daily. , Disp: , Rfl:     naltrexone capsule, Take 6 mg once at bedtime daily., Disp: 90 capsule, Rfl: 3    ondansetron (ZOFRAN) 8 MG tablet, Take 8 mg by mouth every 8 (eight) hours as needed., Disp: , Rfl:     ondansetron (ZOFRAN-ODT) 4 MG TbDL, Take 4 mg by mouth every 6 (six) hours as needed., Disp: , Rfl:     pantoprazole (PROTONIX) 40 MG tablet, Take 40 mg by mouth 3 (three) times daily., Disp: , Rfl:     pregabalin (LYRICA) 75 MG capsule, TAKE ONE CAPSULE BY MOUTH TWICE DAILY, Disp: 60 capsule, Rfl: 5    rosuvastatin (CRESTOR) 10 MG tablet, Take 10 mg by mouth once daily., Disp: , Rfl: 5    semaglutide (OZEMPIC) 0.25 mg or 0.5 mg (2 mg/3 mL) pen injector, Inject 0.5 mg into the skin every 7 days., Disp: , Rfl:     SITagliptin (JANUVIA) 100 MG Tab,  Januvia 100 mg tablet  TAKE ONE TABLET BY MOUTH DAILY, Disp: , Rfl:     tiZANidine (ZANAFLEX) 4 MG tablet, TAKE ONE TABLET BY MOUTH EVERY 8 HOURS AS NEEDED FOR PAIN, Disp: 90 tablet, Rfl: 11    VIIBRYD 40 mg Tab tablet, Take 40 mg by mouth once daily., Disp: , Rfl:     amitriptyline (ELAVIL) 75 MG tablet, Take 1 tablet (75 mg total) by mouth every evening., Disp: 30 tablet, Rfl: 11    topiramate (TOPAMAX) 100 MG tablet, Take 1 tablet (100 mg total) by mouth 2 (two) times daily., Disp: 60 tablet, Rfl: 11    UBRELVY 100 mg tablet, Take 1 tablet (100 mg total) by mouth every 72 hours as needed for Migraine (2 to 3 times max per week as needed)., Disp: 10 tablet, Rfl: 2  Past Medical History:   Diagnosis Date    Anemia     Asthma     Childhood    Diabetes mellitus 2003    Fibromyalgia 2013    Heart disease     Hypertension 1996    Primary osteoarthritis involving multiple joints 3/6/2019     Past Surgical History:   Procedure Laterality Date    CERVICAL DISC ARTHROPLASTY  04/2015    2 aritificial discs placed    GASTRIC BYPASS  07/2015    HYSTERECTOMY  2019    Lab band reversal  2014    LAPAROSCOPIC GASTRIC BANDING  2001    OOPHORECTOMY  2019    ROBOT-ASSISTED LAPAROSCOPIC HYSTERECTOMY      SINUS SURGERY  1996    SINUS SURGERY  2007    TONSILLECTOMY, ADENOIDECTOMY  1981     Social History     Socioeconomic History    Marital status: Single   Occupational History    Occupation: Teacher   Tobacco Use    Smoking status: Every Day     Current packs/day: 0.50     Average packs/day: 0.5 packs/day for 14.4 years (7.2 ttl pk-yrs)     Types: Cigarettes     Start date: 1999     Last attempt to quit: 2009     Passive exposure: Past    Smokeless tobacco: Never   Substance and Sexual Activity    Alcohol use: Yes     Comment: occ 1-2 glasses a month    Drug use: No    Sexual activity: Not Currently     Partners: Male     Birth control/protection: Abstinence, Injection       Past/Current Medical/Surgical History, Past/Current Social  History, Past/Current Family History and Past/Current Medications were reviewed in detail.        Objective:           VITAL SIGNS WERE REVIEWED      GENERAL APPEARANCE:     The patient looks uncomfortable.    BMI 33.75>35.84 KG     No signs of respiratory distress.    Normal breathing pattern.    No dysmorphic features    Normal eye contact.     GENERAL MEDICAL EXAM:    HEENT:  Head is atraumatic normocephalic. Fundoscopic (Ophthalmoscopic) exam showed no disc edema.      Neck and Axillae: No JVD. No visible lesions.    Cardiopulmonary: No cyanosis. No tachypnea. Normal respiratory effort.    Gastrointestinal/Urogenital:  No jaundice. No stomas or lesions. No visible hernias. No catheters.     Skin, Hair and Nails: No pathognonomic skin rash. No neurofibromatosis. No visible lesions.No stigmata of autoimmune disease. No clubbing.    Limbs: No varicose veins. No visible swelling.    Muskoskeletal: No visible deformities.No visible lesions.           Neurologic Exam     Mental Status   Oriented to person, place, and time.   Follows 3 step commands.   Attention: normal. Concentration: normal.   Speech: speech is normal   Level of consciousness: alert  Able to name object. Able to repeat. Normal comprehension.     Cranial Nerves   Cranial nerves II through XII intact.     CN II   Visual fields full to confrontation.   Visual acuity: normal  Right visual field deficit: none  Left visual field deficit: none     CN III, IV, VI   Pupils are equal, round, and reactive to light.  Extraocular motions are normal.   Right pupil: Size: 2 mm. Shape: regular. Reactivity: brisk. Consensual response: intact. Accommodation: intact.   Left pupil: Size: 2 mm. Shape: regular. Reactivity: brisk. Consensual response: intact. Accommodation: intact.   CN III: no CN III palsy  CN VI: no CN VI palsy  Nystagmus: none   Diplopia: none  Ophthalmoparesis: none  Upgaze: normal  Downgaze: normal  Conjugate gaze: present  Vestibulo-ocular reflex:  present    CN V   Facial sensation intact.   Right facial sensation deficit: none  Left facial sensation deficit: none  Jaw jerk: normal    CN VII   Facial expression full, symmetric.   Right facial weakness: none  Left facial weakness: none    CN VIII   CN VIII normal.   Hearing: intact    CN IX, X   CN IX normal.   CN X normal.   Palate: symmetric    CN XI   CN XI normal.   Right sternocleidomastoid strength: normal  Left sternocleidomastoid strength: normal  Right trapezius strength: normal  Left trapezius strength: normal    CN XII   CN XII normal.   Tongue: not atrophic  Fasciculations: absent  Tongue deviation: none    Motor Exam   Muscle bulk: normal  Overall muscle tone: normal  Right arm tone: normal  Left arm tone: normal  Right arm pronator drift: absent  Left arm pronator drift: absent  Right leg tone: normal  Left leg tone: normal    Strength   Strength 5/5 throughout.   Right neck flexion: 5/5  Left neck flexion: 5/5  Right neck extension: 5/5  Left neck extension: 5/5  Right deltoid: 5/5  Left deltoid: 5/5  Right biceps: 5/5  Left biceps: 5/5  Right triceps: 5/5  Left triceps: 5/5  Right wrist flexion: 5/5  Left wrist flexion: 5/5  Right wrist extension: 5/5  Left wrist extension: 5/5  Right interossei: 5/5  Left interossei: 5/5  Right iliopsoas: 5/5  Left iliopsoas: 5/5  Right quadriceps: 5/5  Left quadriceps: 5/5  Right hamstrin/5  Left hamstrin/5  Right glutei: 5/5  Left glutei: 5/5  Right anterior tibial: 5/5  Left anterior tibial: 5/5  Right posterior tibial: 5/5  Left posterior tibial: 5/5  Right peroneal: 5/5  Left peroneal: 5/5  Right gastroc: 5/5  Left gastroc: 5/5    Sensory Exam   Light touch normal.   Right arm light touch: normal  Left arm light touch: normal  Right leg light touch: normal  Left leg light touch: normal  Vibration normal.   Right arm vibration: normal  Left arm vibration: normal  Right leg vibration: normal  Left leg vibration: normal  Proprioception normal.    Right arm proprioception: normal  Left arm proprioception: normal  Right leg proprioception: normal  Left leg proprioception: normal  Pinprick normal.   Right arm pinprick: normal  Left arm pinprick: normal  Right leg pinprick: normal  Left leg pinprick: normal  Graphesthesia: normal  Stereognosis: normal    Gait, Coordination, and Reflexes     Gait  Gait: normal    Coordination   Romberg: negative  Finger to nose coordination: normal  Heel to shin coordination: normal  Tandem walking coordination: normal    Tremor   Resting tremor: absent  Intention tremor: absent  Action tremor: absent    Reflexes   Right brachioradialis: 2+  Left brachioradialis: 2+  Right biceps: 2+  Left biceps: 2+  Right triceps: 2+  Left triceps: 2+  Right patellar: 2+  Left patellar: 2+  Right achilles: 2+  Left achilles: 2+  Right plantar: normal  Left plantar: normal  Right Robison: absent  Left Robison: absent  Right ankle clonus: absent  Left ankle clonus: absent  Right pendular knee jerk: absent  Left pendular knee jerk: absent    Lab Results   Component Value Date    WBC 6.2 06/02/2025    HGB 13.6 06/02/2025    HCT 41.7 06/02/2025    MCV 98 06/17/2024     06/02/2025     Sodium   Date Value Ref Range Status   06/17/2024 140 136 - 145 mmol/L Final     Potassium   Date Value Ref Range Status   06/17/2024 4.0 3.5 - 5.1 mmol/L Final     Chloride   Date Value Ref Range Status   06/17/2024 109 95 - 110 mmol/L Final     CO2   Date Value Ref Range Status   06/17/2024 23 23 - 29 mmol/L Final     Glucose   Date Value Ref Range Status   06/17/2024 100 70 - 110 mg/dL Final     BUN   Date Value Ref Range Status   06/17/2024 10 6 - 20 mg/dL Final     Creatinine   Date Value Ref Range Status   06/17/2024 0.7 0.5 - 1.4 mg/dL Final     Calcium   Date Value Ref Range Status   06/17/2024 9.1 8.7 - 10.5 mg/dL Final     Total Protein   Date Value Ref Range Status   06/17/2024 6.9 6.0 - 8.4 g/dL Final     Albumin   Date Value Ref Range Status    06/17/2024 4.0 3.5 - 5.2 g/dL Final     Total Bilirubin   Date Value Ref Range Status   06/17/2024 0.3 0.1 - 1.0 mg/dL Final     Comment:     For infants and newborns, interpretation of results should be based  on gestational age, weight and in agreement with clinical  observations.    Premature Infant recommended reference ranges:  Up to 24 hours.............<8.0 mg/dL  Up to 48 hours............<12.0 mg/dL  3-5 days..................<15.0 mg/dL  6-29 days.................<15.0 mg/dL       Alkaline Phosphatase   Date Value Ref Range Status   06/17/2024 59 55 - 135 U/L Final     AST   Date Value Ref Range Status   06/17/2024 41 (H) 10 - 40 U/L Final     ALT   Date Value Ref Range Status   06/17/2024 60 (H) 10 - 44 U/L Final     Anion Gap   Date Value Ref Range Status   06/17/2024 8 8 - 16 mmol/L Final     eGFR if    Date Value Ref Range Status   07/15/2022 >60 >60 mL/min/1.73 m^2 Final     eGFR if non    Date Value Ref Range Status   07/15/2022 >60 >60 mL/min/1.73 m^2 Final     Comment:     Calculation used to obtain the estimated glomerular filtration  rate (eGFR) is the CKD-EPI equation.        Lab Results   Component Value Date    [id number] 375 03/25/2024     Lab Results   Component Value Date    TSH 2.08 06/02/2025    M1YQXXZ 6.3 07/19/2021       09-    Brain MRI NL     Reviewed the neuroimaging independently         Assessment:       1. Migraine without aura and without status migrainosus, not intractable    2. S/P bariatric surgery    3. Other vitamin B12 deficiency anemias    4. Sjogren's syndrome, with unspecified organ involvement    5. Fibromyalgia    6. CFS (chronic fatigue syndrome)    7. Anxiety and depression    8. Other iron deficiency anemias              Plan:           S/P  HYSTERECTOMY     COMMON MIGRAINE WITHOUT AURA, EPISODIC, HIGH FREQUENCY, UNCONTROLLED           HEADACHE DIARY     DISCUSSED THE THREE-FOLD MANAGEMENT OF MIGRAINE:      LIFESTYLE  CHANGES:       Good sleep hygiene  Avoid general triggers like lack of sleep/too much sleep, prolonged sun exposure, excessive screen time and specific triggers based on you own diary   Minimize physical and emotional stress  Smoking avoidance and cessation  Limit caffeine drinks to 1-2 a day   Good hydration   Small frequent meals and avoid skipping meals   Moderate 30-minute-long aerobic exercises 3 times/week. Avoid strenuous exercise         ABORTIVE MEDICATIONS (ACUTE-RESCUE MEDICATIONS):     Should only be taken 2-3 times/week to avoid rebound and overuse headaches.    Abortives tried: OTC NSAIDs, Fioricet, Triptans (Imitrex, Relpax), Nurtec.    Continue Ubrelvy 100 mg PO PRN.        NEXT OPTIONS:    Ditans: Reyvow (lasmiditan) 100 mg (No driving due to sedation)      LAST RESORT:     DHE NS Trudhesa (Max 2 a week)     C/I: concomitant use of vasoconstrictors like Triptans, strong CY inhibitors such as HAART PIs (eg, ritonavir, nelfinavir, or indinavir) and Macrolides (eg, erythromycin or clarithromycin), CAD, PVD, Stroke/TIA and Uncontrolled HTN.  Serious SEs include Vasospasm and Fibrosis (chronic use).       PREVENTATIVE (MORE ACCURATELY MIGRAINE REDUCTION) MEDICATIONS:         Since the patient's headache is very frequent a lengthy discussion about preventative medications was carried out.The patient understands that prevention means DECREASING frequency and severity and NOT elimination.The patient was made aware that any new medication can cause serious allergic reaction.The medication is considered failure only if a therapeutic dose reached and maintained for 6-8 weeks.    Preventatives tried: BB (Inderal and Metoprolol), SSRIs, AEDs (PGB, TPM).    Continue Amitriptyline/Elavil (TCA) 75 mg QHS refills sent     HELPFUL SUPPLEMENTS:     Helpful supplements include Co-Q 10, B2, Mg, Feverfew (Dolovent combination) and butterbur (Petadolex)      NEXT OPTIONS:       ANTI-CGRP AGENTS: Qulipta (alogepant) 60  mg QD, Erenumab (Aimovig) 140 mg SQ Pen monthly (Reported cases of Constipation and BP elevation) , Galcanezumab (Emgality) 120 mg SQ Pen monthly after a loading dose of 240 mg  and Fremnezumab (Ajovy) (Ligand Blocker): 225 mg SQ monthly or 675 mg every 3 months .    Lamotrigine/Lamictal  (LTG)slow titration to 100 mg BID which can cause serious skin rash and rare cardiac arrhythmias. LTG is superior to other therapies for specifically reducing migraine aura.     Botox 200 units every 3 months .        LAST RESORT OPTIONS:      Namenda 10 mg BID     Neuromodulation: Cefaly, Relivion     Valproic acid/ Depakote       OFF LABEL-EXPERIMENTAL     Migraine surgery.    Acupuncture, massage therapy and essential oils.      FMS CHRONIC PAIN     Continue  mg po BID  for that reason of Pain control and weight management.       MEDICAL/SURGICAL COMORBIDITIES     All relevant medical comorbidities noted and managed by primary care physician and medical care team.          HEALTHY LIFESTYLE AND PREVENTATIVE CARE    The patient to adhere to the age-appropriate health maintenance guidelines including screening tests and vaccinations. The patient to adhere to  healthy lifestyle, optimal weight, exercise, healthy diet, good sleep hygiene and avoiding drugs including smoking, alcohol and recreational drugs.        RTC in 11 months     Total E/M 20 minutes      Kyle Mccullough, MSN NP      Collaborating Provider: Jose Alfredo Joshua MD, FAAN Neurologist/Epileptologist     The patient location is: [place]  The chief complaint leading to consultation is Common Migraines     Visit type: audiovisual  Each patient to whom he or she provides medical services by telemedicine is:  (1) informed of the relationship between the physician and patient and the respective role of any other health care provider with respect to management of the patient; and (2) notified that he or she may decline to receive medical services by telemedicine and may  withdraw from such care at any time.

## 2025-06-26 ENCOUNTER — LAB VISIT (OUTPATIENT)
Dept: LAB | Facility: HOSPITAL | Age: 45
End: 2025-06-26
Attending: NURSE PRACTITIONER
Payer: COMMERCIAL

## 2025-06-26 DIAGNOSIS — R74.8 ELEVATED LIVER ENZYMES: ICD-10-CM

## 2025-06-26 DIAGNOSIS — D51.8 OTHER VITAMIN B12 DEFICIENCY ANEMIAS: ICD-10-CM

## 2025-06-26 DIAGNOSIS — D50.8 OTHER IRON DEFICIENCY ANEMIAS: ICD-10-CM

## 2025-06-26 LAB
ALBUMIN SERPL-MCNC: 3.4 G/DL (ref 3.5–5)
ALBUMIN/GLOB SERPL: 1.1 RATIO (ref 1.1–2)
ALP SERPL-CCNC: 86 UNIT/L (ref 40–150)
ALT SERPL-CCNC: 109 UNIT/L (ref 0–55)
ANION GAP SERPL CALC-SCNC: 7 MEQ/L
AST SERPL-CCNC: 87 UNIT/L (ref 11–45)
BASOPHILS # BLD AUTO: 0.04 X10(3)/MCL
BASOPHILS NFR BLD AUTO: 0.6 %
BILIRUB SERPL-MCNC: 0.2 MG/DL
BUN SERPL-MCNC: 10 MG/DL (ref 7–18.7)
CALCIUM SERPL-MCNC: 8.6 MG/DL (ref 8.4–10.2)
CHLORIDE SERPL-SCNC: 112 MMOL/L (ref 98–107)
CO2 SERPL-SCNC: 23 MMOL/L (ref 22–29)
CREAT SERPL-MCNC: 0.68 MG/DL (ref 0.55–1.02)
CREAT/UREA NIT SERPL: 15
EOSINOPHIL # BLD AUTO: 0.14 X10(3)/MCL (ref 0–0.9)
EOSINOPHIL NFR BLD AUTO: 2.2 %
ERYTHROCYTE [DISTWIDTH] IN BLOOD BY AUTOMATED COUNT: 12.9 % (ref 11.5–17)
FERRITIN SERPL-MCNC: 66.25 NG/ML (ref 4.63–204)
GFR SERPLBLD CREATININE-BSD FMLA CKD-EPI: >60 ML/MIN/1.73/M2
GLOBULIN SER-MCNC: 3.2 GM/DL (ref 2.4–3.5)
GLUCOSE SERPL-MCNC: 109 MG/DL (ref 74–100)
HCT VFR BLD AUTO: 41.6 % (ref 37–47)
HGB BLD-MCNC: 13 G/DL (ref 12–16)
IMM GRANULOCYTES # BLD AUTO: 0.03 X10(3)/MCL (ref 0–0.04)
IMM GRANULOCYTES NFR BLD AUTO: 0.5 %
IRON SATN MFR SERPL: 35 % (ref 20–50)
IRON SERPL-MCNC: 113 UG/DL (ref 50–170)
LYMPHOCYTES # BLD AUTO: 2.2 X10(3)/MCL (ref 0.6–4.6)
LYMPHOCYTES NFR BLD AUTO: 34.1 %
MCH RBC QN AUTO: 30.9 PG (ref 27–31)
MCHC RBC AUTO-ENTMCNC: 31.3 G/DL (ref 33–36)
MCV RBC AUTO: 98.8 FL (ref 80–94)
MONOCYTES # BLD AUTO: 0.48 X10(3)/MCL (ref 0.1–1.3)
MONOCYTES NFR BLD AUTO: 7.4 %
NEUTROPHILS # BLD AUTO: 3.57 X10(3)/MCL (ref 2.1–9.2)
NEUTROPHILS NFR BLD AUTO: 55.2 %
NRBC BLD AUTO-RTO: 0 %
PLATELET # BLD AUTO: 286 X10(3)/MCL (ref 130–400)
PMV BLD AUTO: 9.6 FL (ref 7.4–10.4)
POTASSIUM SERPL-SCNC: 3.6 MMOL/L (ref 3.5–5.1)
PROT SERPL-MCNC: 6.6 GM/DL (ref 6.4–8.3)
RBC # BLD AUTO: 4.21 X10(6)/MCL (ref 4.2–5.4)
SODIUM SERPL-SCNC: 142 MMOL/L (ref 136–145)
TIBC SERPL-MCNC: 207 UG/DL (ref 70–310)
TIBC SERPL-MCNC: 320 UG/DL (ref 250–450)
TRANSFERRIN SERPL-MCNC: 291 MG/DL (ref 180–382)
VIT B12 SERPL-MCNC: 543 PG/ML (ref 213–816)
WBC # BLD AUTO: 6.46 X10(3)/MCL (ref 4.5–11.5)

## 2025-06-26 PROCEDURE — 83540 ASSAY OF IRON: CPT

## 2025-06-26 PROCEDURE — 85025 COMPLETE CBC W/AUTO DIFF WBC: CPT

## 2025-06-26 PROCEDURE — 80053 COMPREHEN METABOLIC PANEL: CPT

## 2025-06-26 PROCEDURE — 82728 ASSAY OF FERRITIN: CPT

## 2025-06-26 PROCEDURE — 82607 VITAMIN B-12: CPT

## 2025-06-26 PROCEDURE — 36415 COLL VENOUS BLD VENIPUNCTURE: CPT

## 2025-06-30 ENCOUNTER — OFFICE VISIT (OUTPATIENT)
Dept: HEMATOLOGY/ONCOLOGY | Facility: CLINIC | Age: 45
End: 2025-06-30
Payer: COMMERCIAL

## 2025-06-30 VITALS
HEIGHT: 65 IN | WEIGHT: 193.31 LBS | BODY MASS INDEX: 32.21 KG/M2 | DIASTOLIC BLOOD PRESSURE: 56 MMHG | HEART RATE: 78 BPM | OXYGEN SATURATION: 96 % | SYSTOLIC BLOOD PRESSURE: 90 MMHG | TEMPERATURE: 98 F

## 2025-06-30 DIAGNOSIS — D50.8 OTHER IRON DEFICIENCY ANEMIAS: Primary | ICD-10-CM

## 2025-06-30 DIAGNOSIS — D51.8 OTHER VITAMIN B12 DEFICIENCY ANEMIAS: ICD-10-CM

## 2025-06-30 DIAGNOSIS — R74.8 ELEVATED LIVER ENZYMES: ICD-10-CM

## 2025-06-30 PROCEDURE — 3074F SYST BP LT 130 MM HG: CPT | Mod: CPTII,S$GLB,, | Performed by: NURSE PRACTITIONER

## 2025-06-30 PROCEDURE — 4010F ACE/ARB THERAPY RXD/TAKEN: CPT | Mod: CPTII,S$GLB,, | Performed by: NURSE PRACTITIONER

## 2025-06-30 PROCEDURE — 3008F BODY MASS INDEX DOCD: CPT | Mod: CPTII,S$GLB,, | Performed by: NURSE PRACTITIONER

## 2025-06-30 PROCEDURE — 99214 OFFICE O/P EST MOD 30 MIN: CPT | Mod: S$GLB,,, | Performed by: NURSE PRACTITIONER

## 2025-06-30 PROCEDURE — 1160F RVW MEDS BY RX/DR IN RCRD: CPT | Mod: CPTII,S$GLB,, | Performed by: NURSE PRACTITIONER

## 2025-06-30 PROCEDURE — 99999 PR PBB SHADOW E&M-EST. PATIENT-LVL V: CPT | Mod: PBBFAC,,, | Performed by: NURSE PRACTITIONER

## 2025-06-30 PROCEDURE — 3078F DIAST BP <80 MM HG: CPT | Mod: CPTII,S$GLB,, | Performed by: NURSE PRACTITIONER

## 2025-06-30 PROCEDURE — 1159F MED LIST DOCD IN RCRD: CPT | Mod: CPTII,S$GLB,, | Performed by: NURSE PRACTITIONER

## 2025-06-30 NOTE — ASSESSMENT & PLAN NOTE
Resolution of iron deficiency s/p previously administered weekly Feraheme x 2 12/2023. Iron levels remain adequate. No anemia      F/u 6 months with cbc iron ferritin. Sooner f/u PRN

## 2025-06-30 NOTE — PROGRESS NOTES
Subjective:       Patient ID: Shelby Meeks is a 45 y.o. female.    Chief Complaint: Review labs. Nutritional anemia    HPI: 45 y.o female with h/o gastric bypass, anemia, iron deficiency, B12 deficiency presenting today for follow up. She feels well overall but does note chronic fatigue. Taking sublingual B12.     Feraheme weekly x 2 completed 12/2023  Social History     Socioeconomic History    Marital status: Single   Occupational History    Occupation: Teacher   Tobacco Use    Smoking status: Every Day     Current packs/day: 0.50     Average packs/day: 0.5 packs/day for 14.5 years (7.2 ttl pk-yrs)     Types: Cigarettes     Start date: 1999     Last attempt to quit: 2009     Passive exposure: Past    Smokeless tobacco: Never   Substance and Sexual Activity    Alcohol use: Yes     Comment: occ 1-2 glasses a month    Drug use: No    Sexual activity: Not Currently     Partners: Male     Birth control/protection: Abstinence, Injection     Social Drivers of Health     Financial Resource Strain: Medium Risk (6/30/2025)    Overall Financial Resource Strain (CARDIA)     Difficulty of Paying Living Expenses: Somewhat hard   Food Insecurity: No Food Insecurity (6/30/2025)    Hunger Vital Sign     Worried About Running Out of Food in the Last Year: Never true     Ran Out of Food in the Last Year: Never true   Transportation Needs: No Transportation Needs (6/30/2025)    PRAPARE - Transportation     Lack of Transportation (Medical): No     Lack of Transportation (Non-Medical): No   Physical Activity: Inactive (6/30/2025)    Exercise Vital Sign     Days of Exercise per Week: 0 days     Minutes of Exercise per Session: 0 min   Stress: Stress Concern Present (6/30/2025)    [employer] - Occupational Stress Questionnaire     Feeling of Stress : To some extent   Housing Stability: Low Risk  (6/30/2025)    Housing Stability Vital Sign     Unable to Pay for Housing in the Last Year: No     Number  of Times Moved in the Last Year: 1     Homeless in the Last Year: No       Past Medical History:   Diagnosis Date    Anemia     Asthma     Childhood    Diabetes mellitus 2003    Fibromyalgia 2013    Heart disease     Hypertension 1996    Primary osteoarthritis involving multiple joints 3/6/2019       Family History   Problem Relation Name Age of Onset    Cancer Mother      Hypertension Mother      Cancer Father      Asthma Paternal Uncle      Cancer Maternal Grandfather         Past Surgical History:   Procedure Laterality Date    CERVICAL DISC ARTHROPLASTY  04/2015    2 aritificial discs placed    GASTRIC BYPASS  07/2015    HYSTERECTOMY  2019    Lab band reversal  2014    LAPAROSCOPIC GASTRIC BANDING  2001    OOPHORECTOMY  2019    ROBOT-ASSISTED LAPAROSCOPIC HYSTERECTOMY      SINUS SURGERY  1996    SINUS SURGERY  2007    TONSILLECTOMY, ADENOIDECTOMY  1981       Review of Systems   Constitutional:  Positive for fatigue. Negative for appetite change, chills, fever and unexpected weight change.   HENT:  Negative for congestion, mouth sores, nosebleeds, sore throat, trouble swallowing and voice change.    Respiratory:  Negative for cough, chest tightness, shortness of breath and wheezing.    Cardiovascular:  Negative for chest pain and leg swelling.   Gastrointestinal:  Negative for abdominal distention, abdominal pain, blood in stool, constipation, diarrhea, nausea and vomiting.   Genitourinary:  Negative for difficulty urinating, dysuria and hematuria.   Musculoskeletal:  Negative for arthralgias, back pain and myalgias.   Skin:  Negative for pallor, rash and wound.   Neurological:  Negative for dizziness, syncope, weakness and headaches.   Hematological:  Negative for adenopathy. Does not bruise/bleed easily.   Psychiatric/Behavioral:  The patient is not nervous/anxious.          Medication List with Changes/Refills   Current Medications    ALPRAZOLAM (XANAX) 0.25 MG TABLET    alprazolam 0.25 mg tablet   Take 1 by  mouth daily    AMITRIPTYLINE (ELAVIL) 75 MG TABLET    Take 1 tablet (75 mg total) by mouth every evening.    BUPROPION (WELLBUTRIN SR) 200 MG SR12    Take 200 mg by mouth once daily.    CETIRIZINE (ZYRTEC) 10 MG TABLET    Take 10 mg by mouth daily as needed.     CEVIMELINE (EVOXAC) 30 MG CAPSULE    TAKE ONE CAPSULE BY MOUTH THREE TIMES DAILY    CYANOCOBALAMIN, VITAMIN B-12, 1,000 MCG SUBL    Place 1,000 mcg under the tongue once daily.    DICYCLOMINE (BENTYL) 20 MG TABLET    Take 20 mg by mouth as needed.     EPINEPHRINE (EPIPEN) 0.3 MG/0.3 ML ATIN        ESZOPICLONE (LUNESTA) 3 MG TAB    eszopiclone 3 mg tablet   Take 1 tablet by mouth at bedtime    FLOVENT  MCG/ACTUATION INHALER    INHALE TWO PUFFS BY MOUTH TWICE DAILY FOR ASTHMA MAINTENANCE    FOLIC ACID (FOLVITE) 1 MG TABLET    TAKE ONE TABLET BY MOUTH ONCE DAILY    KETOCONAZOLE (NIZORAL) 2 % CREAM    Apply 1 mg topically as needed.    LINACLOTIDE (LINZESS) 145 MCG CAP CAPSULE    Linzess 145 mcg capsule   TAKE ONE CAPSULE BY MOUTH 30 MINUTES PRIOR TO A MEAL    LISINOPRIL 10 MG TABLET    Take 5 mg by mouth once daily. Med changed from 10 mg to 5 mg presently    METFORMIN (GLUCOPHAGE) 1000 MG TABLET    metformin 1,000 mg tablet   TAKE ONE TABLET BY MOUTH TWICE DAILY]    METOPROLOL SUCCINATE (TOPROL-XL) 25 MG 24 HR TABLET    Take 25 mg by mouth.    METOPROLOL SUCCINATE (TOPROL-XL) 25 MG 24 HR TABLET    Take 1 tablet by mouth every evening.    MONTELUKAST (SINGULAIR) 10 MG TABLET    Take 10 mg by mouth once daily.     NALTREXONE CAPSULE    Take 6 mg once at bedtime daily.    ONDANSETRON (ZOFRAN) 8 MG TABLET    Take 8 mg by mouth every 8 (eight) hours as needed.    ONDANSETRON (ZOFRAN-ODT) 4 MG TBDL    Take 4 mg by mouth every 6 (six) hours as needed.    PANTOPRAZOLE (PROTONIX) 40 MG TABLET    Take 40 mg by mouth 3 (three) times daily.    PREGABALIN (LYRICA) 75 MG CAPSULE    TAKE ONE CAPSULE BY MOUTH TWICE DAILY    ROSUVASTATIN (CRESTOR) 10 MG TABLET    Take  10 mg by mouth once daily.    SEMAGLUTIDE (OZEMPIC) 0.25 MG OR 0.5 MG (2 MG/3 ML) PEN INJECTOR    Inject 0.5 mg into the skin every 7 days.    SITAGLIPTIN (JANUVIA) 100 MG TAB    Januvia 100 mg tablet   TAKE ONE TABLET BY MOUTH DAILY    TIZANIDINE (ZANAFLEX) 4 MG TABLET    TAKE ONE TABLET BY MOUTH EVERY 8 HOURS AS NEEDED FOR PAIN    TOPIRAMATE (TOPAMAX) 100 MG TABLET    Take 1 tablet (100 mg total) by mouth 2 (two) times daily.    UBRELVY 100 MG TABLET    Take 1 tablet (100 mg total) by mouth every 72 hours as needed for Migraine (2 to 3 times max per week as needed).    VIIBRYD 40 MG TAB TABLET    Take 40 mg by mouth once daily.     Objective:     Vitals:    06/30/25 1351   BP: (!) 90/56   Pulse:    Temp:      Lab Results   Component Value Date    WBC 6.46 06/26/2025    HGB 13.0 06/26/2025    HCT 41.6 06/26/2025    MCV 98.8 (H) 06/26/2025     06/26/2025       BMP  Lab Results   Component Value Date     06/26/2025    K 3.6 06/26/2025     (H) 06/26/2025    CO2 23 06/26/2025    BUN 10.0 06/26/2025    CREATININE 0.68 06/26/2025    CALCIUM 8.6 06/26/2025    ANIONGAP 8 06/17/2024    EGFRNORACEVR >60 06/26/2025     Lab Results   Component Value Date     (H) 06/26/2025    AST 87 (H) 06/26/2025    ALKPHOS 86 06/26/2025    BILITOT 0.2 06/26/2025     Lab Results   Component Value Date    UIBC 207 06/26/2025    IRON 113 06/26/2025    TRANSFERRIN 291 06/26/2025    TIBC 320 06/26/2025    LABIRON 35 06/26/2025    FESATURATED 25 06/17/2024      Lab Results   Component Value Date    RSWDAELV41 543 06/26/2025     Lab Results   Component Value Date    FOLATE 36.2 (H) 03/25/2024         Physical Exam  Vitals reviewed.   Constitutional:       Appearance: She is well-developed.   HENT:      Head: Normocephalic.      Right Ear: External ear normal.      Left Ear: External ear normal.      Nose: Nose normal.   Eyes:      General: Lids are normal. No scleral icterus.        Right eye: No discharge.         Left  eye: No discharge.      Conjunctiva/sclera: Conjunctivae normal.   Neck:      Thyroid: No thyroid mass.   Cardiovascular:      Rate and Rhythm: Normal rate and regular rhythm.      Heart sounds: Normal heart sounds.   Pulmonary:      Effort: Pulmonary effort is normal. No respiratory distress.      Breath sounds: Normal breath sounds.   Abdominal:      General: There is no distension.   Genitourinary:     Comments: deferred  Musculoskeletal:         General: Normal range of motion.      Cervical back: Normal range of motion.   Skin:     General: Skin is warm and dry.   Neurological:      Mental Status: She is alert and oriented to person, place, and time.   Psychiatric:         Speech: Speech normal.         Behavior: Behavior normal. Behavior is cooperative.         Thought Content: Thought content normal.        Assessment:     Problem List Items Addressed This Visit          Oncology    Other iron deficiency anemias - Primary    Resolution of iron deficiency s/p previously administered weekly Feraheme x 2 12/2023. Iron levels remain adequate. No anemia      F/u 6 months with cbc iron ferritin. Sooner f/u PRN         Relevant Orders    CBC Auto Differential    Iron and TIBC    Ferritin    Other vitamin B12 deficiency anemias    Continue sublingual B12          Relevant Orders    CBC Auto Differential    Iron and TIBC    Ferritin       GI    Elevated liver enzymes    Stable. Followed by Hepatology               Plan:     Other iron deficiency anemias  -     CBC Auto Differential; Future; Expected date: 06/30/2025  -     Iron and TIBC; Future; Expected date: 06/30/2025  -     Ferritin; Future; Expected date: 06/30/2025    Other vitamin B12 deficiency anemias  -     CBC Auto Differential; Future; Expected date: 06/30/2025  -     Iron and TIBC; Future; Expected date: 06/30/2025  -     Ferritin; Future; Expected date: 06/30/2025    Elevated liver enzymes            Med Onc Chart Routing      Follow up with physician     Follow up with [facility] 6 months.   Infusion scheduling note    Injection scheduling note    Labs CBC, ferritin and iron and TIBC   Scheduling:  Preferred lab:  Lab interval:  1-2 days prior   Imaging None      Pharmacy appointment No pharmacy appointment needed      Other referrals No nutrition appointment needed -        No additional referrals needed       RENE WilsonP-C

## 2025-08-11 ENCOUNTER — PATIENT MESSAGE (OUTPATIENT)
Dept: RHEUMATOLOGY | Facility: CLINIC | Age: 45
End: 2025-08-11

## 2025-08-11 ENCOUNTER — OFFICE VISIT (OUTPATIENT)
Dept: RHEUMATOLOGY | Facility: CLINIC | Age: 45
End: 2025-08-11
Payer: COMMERCIAL

## 2025-08-11 DIAGNOSIS — G93.32 CFS (CHRONIC FATIGUE SYNDROME): ICD-10-CM

## 2025-08-11 DIAGNOSIS — M35.09 SJOGREN'S SYNDROME WITH OTHER ORGAN INVOLVEMENT: ICD-10-CM

## 2025-08-11 DIAGNOSIS — M19.031 LOCALIZED PRIMARY OSTEOARTHRITIS OF CARPOMETACARPAL (CMC) JOINT OF RIGHT WRIST: ICD-10-CM

## 2025-08-11 DIAGNOSIS — M79.7 FIBROMYALGIA: Primary | ICD-10-CM

## 2025-08-11 PROCEDURE — 4010F ACE/ARB THERAPY RXD/TAKEN: CPT | Mod: CPTII,95,, | Performed by: INTERNAL MEDICINE

## 2025-08-11 PROCEDURE — G2211 COMPLEX E/M VISIT ADD ON: HCPCS | Mod: 95,,, | Performed by: INTERNAL MEDICINE

## 2025-08-11 PROCEDURE — 1159F MED LIST DOCD IN RCRD: CPT | Mod: CPTII,95,, | Performed by: INTERNAL MEDICINE

## 2025-08-11 PROCEDURE — 98006 SYNCH AUDIO-VIDEO EST MOD 30: CPT | Mod: 95,,, | Performed by: INTERNAL MEDICINE

## 2025-08-11 PROCEDURE — 1160F RVW MEDS BY RX/DR IN RCRD: CPT | Mod: CPTII,95,, | Performed by: INTERNAL MEDICINE

## 2025-08-11 RX ORDER — PREGABALIN 75 MG/1
75 CAPSULE ORAL 2 TIMES DAILY
Qty: 180 CAPSULE | Refills: 2 | Status: SHIPPED | OUTPATIENT
Start: 2025-08-11

## 2025-08-11 RX ORDER — CEVIMELINE HYDROCHLORIDE 30 MG/1
30 CAPSULE ORAL 3 TIMES DAILY
Qty: 90 CAPSULE | Refills: 11 | Status: SHIPPED | OUTPATIENT
Start: 2025-08-11